# Patient Record
Sex: FEMALE | Race: WHITE | NOT HISPANIC OR LATINO | Employment: FULL TIME | URBAN - METROPOLITAN AREA
[De-identification: names, ages, dates, MRNs, and addresses within clinical notes are randomized per-mention and may not be internally consistent; named-entity substitution may affect disease eponyms.]

---

## 2023-08-16 PROBLEM — C50.911 BREAST CANCER, RIGHT (MULTI): Status: ACTIVE | Noted: 2023-08-16

## 2023-08-16 PROBLEM — C50.911 BREAST CANCER METASTASIZED TO AXILLARY LYMPH NODE, RIGHT (MULTI): Status: ACTIVE | Noted: 2023-08-16

## 2023-08-16 PROBLEM — R07.9 CHEST PAIN ON EXERTION: Status: ACTIVE | Noted: 2023-08-16

## 2023-08-16 PROBLEM — N95.1 HOT FLASHES DUE TO MENOPAUSE: Status: ACTIVE | Noted: 2023-08-16

## 2023-08-16 PROBLEM — C77.3 BREAST CANCER METASTASIZED TO AXILLARY LYMPH NODE, RIGHT (MULTI): Status: ACTIVE | Noted: 2023-08-16

## 2023-08-16 PROBLEM — R00.2 PALPITATIONS: Status: ACTIVE | Noted: 2023-08-16

## 2023-08-16 PROBLEM — L23.7 CONTACT DERMATITIS DUE TO POISON IVY: Status: ACTIVE | Noted: 2023-08-16

## 2023-08-16 PROBLEM — D64.9 ANEMIA: Status: ACTIVE | Noted: 2023-08-16

## 2023-08-16 PROBLEM — E07.89 THYROID FULLNESS: Status: ACTIVE | Noted: 2023-08-16

## 2023-08-16 PROBLEM — Z79.810 USE OF TAMOXIFEN (NOLVADEX): Status: ACTIVE | Noted: 2023-08-16

## 2023-08-16 RX ORDER — OXYCODONE AND ACETAMINOPHEN 5; 325 MG/1; MG/1
1 TABLET ORAL AS NEEDED
COMMUNITY
Start: 2022-11-30 | End: 2023-11-16 | Stop reason: ENTERED-IN-ERROR

## 2023-08-16 RX ORDER — DIPHENOXYLATE HYDROCHLORIDE AND ATROPINE SULFATE 2.5; .025 MG/1; MG/1
2 TABLET ORAL EVERY 6 HOURS PRN
COMMUNITY
Start: 2022-07-05 | End: 2023-11-16 | Stop reason: ENTERED-IN-ERROR

## 2023-08-16 RX ORDER — ONDANSETRON 4 MG/1
1-2 TABLET, FILM COATED ORAL EVERY 8 HOURS PRN
COMMUNITY
Start: 2022-07-01 | End: 2023-11-16 | Stop reason: ENTERED-IN-ERROR

## 2023-08-16 RX ORDER — OMEPRAZOLE 20 MG/1
20 CAPSULE, DELAYED RELEASE ORAL DAILY
COMMUNITY
End: 2023-11-16 | Stop reason: ENTERED-IN-ERROR

## 2023-08-16 RX ORDER — TAMOXIFEN CITRATE 20 MG/1
TABLET ORAL
COMMUNITY
End: 2023-10-28 | Stop reason: ALTCHOICE

## 2023-09-13 ENCOUNTER — HOSPITAL ENCOUNTER (OUTPATIENT)
Dept: DATA CONVERSION | Facility: HOSPITAL | Age: 58
Discharge: HOME | End: 2023-09-13
Payer: COMMERCIAL

## 2023-09-13 DIAGNOSIS — R50.9 FEVER, UNSPECIFIED: ICD-10-CM

## 2023-09-21 VITALS — HEIGHT: 64 IN | BODY MASS INDEX: 33.12 KG/M2 | WEIGHT: 194 LBS

## 2023-09-21 DIAGNOSIS — C50.911 BREAST CANCER METASTASIZED TO AXILLARY LYMPH NODE, RIGHT (MULTI): Primary | ICD-10-CM

## 2023-09-21 DIAGNOSIS — C77.3 BREAST CANCER METASTASIZED TO AXILLARY LYMPH NODE, RIGHT (MULTI): Primary | ICD-10-CM

## 2023-09-21 RX ORDER — HEPARIN SODIUM,PORCINE/PF 10 UNIT/ML
50 SYRINGE (ML) INTRAVENOUS AS NEEDED
Status: CANCELLED | OUTPATIENT
Start: 2023-09-30

## 2023-09-21 RX ORDER — HEPARIN 100 UNIT/ML
500 SYRINGE INTRAVENOUS AS NEEDED
Status: CANCELLED | OUTPATIENT
Start: 2023-09-30

## 2023-09-26 RX ORDER — FAMOTIDINE 10 MG/ML
20 INJECTION INTRAVENOUS ONCE AS NEEDED
Status: CANCELLED | OUTPATIENT
Start: 2023-10-03

## 2023-09-26 RX ORDER — PROCHLORPERAZINE EDISYLATE 5 MG/ML
10 INJECTION INTRAMUSCULAR; INTRAVENOUS EVERY 6 HOURS PRN
Status: CANCELLED | OUTPATIENT
Start: 2023-10-03

## 2023-09-26 RX ORDER — DIPHENHYDRAMINE HYDROCHLORIDE 50 MG/ML
50 INJECTION INTRAMUSCULAR; INTRAVENOUS AS NEEDED
Status: CANCELLED | OUTPATIENT
Start: 2023-10-03

## 2023-09-26 RX ORDER — PROCHLORPERAZINE MALEATE 10 MG
10 TABLET ORAL EVERY 6 HOURS PRN
Status: CANCELLED | OUTPATIENT
Start: 2023-10-03

## 2023-09-26 RX ORDER — ALBUTEROL SULFATE 0.83 MG/ML
3 SOLUTION RESPIRATORY (INHALATION) AS NEEDED
Status: CANCELLED | OUTPATIENT
Start: 2023-10-03

## 2023-09-26 RX ORDER — EPINEPHRINE 0.3 MG/.3ML
0.3 INJECTION SUBCUTANEOUS EVERY 5 MIN PRN
Status: CANCELLED | OUTPATIENT
Start: 2023-10-03

## 2023-09-26 RX ORDER — ONDANSETRON HYDROCHLORIDE 2 MG/ML
8 INJECTION, SOLUTION INTRAVENOUS ONCE
Status: CANCELLED | OUTPATIENT
Start: 2023-10-03

## 2023-10-03 ENCOUNTER — INFUSION (OUTPATIENT)
Dept: HEMATOLOGY/ONCOLOGY | Facility: CLINIC | Age: 58
End: 2023-10-03
Payer: COMMERCIAL

## 2023-10-03 VITALS
HEART RATE: 85 BPM | DIASTOLIC BLOOD PRESSURE: 81 MMHG | RESPIRATION RATE: 17 BRPM | BODY MASS INDEX: 34.53 KG/M2 | HEIGHT: 63 IN | SYSTOLIC BLOOD PRESSURE: 134 MMHG | WEIGHT: 194.89 LBS | OXYGEN SATURATION: 96 % | TEMPERATURE: 97.9 F

## 2023-10-03 DIAGNOSIS — C77.3 BREAST CANCER METASTASIZED TO AXILLARY LYMPH NODE, RIGHT (MULTI): ICD-10-CM

## 2023-10-03 DIAGNOSIS — C50.911 BREAST CANCER METASTASIZED TO AXILLARY LYMPH NODE, RIGHT (MULTI): ICD-10-CM

## 2023-10-03 LAB
ALBUMIN SERPL BCP-MCNC: 4.2 G/DL (ref 3.4–5)
ALP SERPL-CCNC: 77 U/L (ref 33–110)
ALT SERPL W P-5'-P-CCNC: 22 U/L (ref 7–45)
ANION GAP SERPL CALC-SCNC: 15 MMOL/L (ref 10–20)
AST SERPL W P-5'-P-CCNC: 40 U/L (ref 9–39)
B-HCG SERPL-ACNC: 2 MIU/ML
BASOPHILS # BLD AUTO: 0.04 X10*3/UL (ref 0–0.1)
BASOPHILS NFR BLD AUTO: 0.6 %
BILIRUB SERPL-MCNC: 0.9 MG/DL (ref 0–1.2)
BUN SERPL-MCNC: 10 MG/DL (ref 6–23)
CALCIUM SERPL-MCNC: 9.5 MG/DL (ref 8.6–10.3)
CHLORIDE SERPL-SCNC: 106 MMOL/L (ref 98–107)
CO2 SERPL-SCNC: 23 MMOL/L (ref 21–32)
CREAT SERPL-MCNC: 0.6 MG/DL (ref 0.5–1.05)
EOSINOPHIL # BLD AUTO: 0.22 X10*3/UL (ref 0–0.7)
EOSINOPHIL NFR BLD AUTO: 3.4 %
ERYTHROCYTE [DISTWIDTH] IN BLOOD BY AUTOMATED COUNT: 13.8 % (ref 11.5–14.5)
GFR SERPL CREATININE-BSD FRML MDRD: >90 ML/MIN/1.73M*2
GLUCOSE SERPL-MCNC: 84 MG/DL (ref 74–99)
HCT VFR BLD AUTO: 38.7 % (ref 36–46)
HGB BLD-MCNC: 12.9 G/DL (ref 12–16)
IMM GRANULOCYTES # BLD AUTO: 0.01 X10*3/UL (ref 0–0.7)
IMM GRANULOCYTES NFR BLD AUTO: 0.2 % (ref 0–0.9)
LYMPHOCYTES # BLD AUTO: 1.15 X10*3/UL (ref 1.2–4.8)
LYMPHOCYTES NFR BLD AUTO: 17.9 %
MCH RBC QN AUTO: 31.9 PG (ref 26–34)
MCHC RBC AUTO-ENTMCNC: 33.3 G/DL (ref 32–36)
MCV RBC AUTO: 96 FL (ref 80–100)
MONOCYTES # BLD AUTO: 0.57 X10*3/UL (ref 0.1–1)
MONOCYTES NFR BLD AUTO: 8.9 %
NEUTROPHILS # BLD AUTO: 4.43 X10*3/UL (ref 1.2–7.7)
NEUTROPHILS NFR BLD AUTO: 69 %
NRBC BLD-RTO: ABNORMAL /100{WBCS}
PLATELET # BLD AUTO: 172 X10*3/UL (ref 150–450)
PMV BLD AUTO: 9.6 FL (ref 7.5–11.5)
POTASSIUM SERPL-SCNC: 3.1 MMOL/L (ref 3.5–5.3)
PROT SERPL-MCNC: 6.8 G/DL (ref 6.4–8.2)
RBC # BLD AUTO: 4.05 X10*6/UL (ref 4–5.2)
SODIUM SERPL-SCNC: 141 MMOL/L (ref 136–145)
WBC # BLD AUTO: 6.4 X10*3/UL (ref 4.4–11.3)

## 2023-10-03 PROCEDURE — 84702 CHORIONIC GONADOTROPIN TEST: CPT

## 2023-10-03 PROCEDURE — 36591 DRAW BLOOD OFF VENOUS DEVICE: CPT

## 2023-10-03 PROCEDURE — 80053 COMPREHEN METABOLIC PANEL: CPT

## 2023-10-03 PROCEDURE — 85025 COMPLETE CBC W/AUTO DIFF WBC: CPT

## 2023-10-03 PROCEDURE — 36415 COLL VENOUS BLD VENIPUNCTURE: CPT

## 2023-10-03 RX ORDER — DEXAMETHASONE 4 MG/1
4 TABLET ORAL 2 TIMES DAILY
COMMUNITY
Start: 2023-06-21 | End: 2023-10-28 | Stop reason: ALTCHOICE

## 2023-10-03 RX ORDER — HEPARIN SODIUM,PORCINE/PF 10 UNIT/ML
50 SYRINGE (ML) INTRAVENOUS AS NEEDED
Status: CANCELLED | OUTPATIENT
Start: 2023-10-03

## 2023-10-03 RX ORDER — HEPARIN 100 UNIT/ML
500 SYRINGE INTRAVENOUS AS NEEDED
Status: CANCELLED | OUTPATIENT
Start: 2023-10-03

## 2023-10-03 RX ORDER — POTASSIUM CHLORIDE 1500 MG/1
40 TABLET, EXTENDED RELEASE ORAL 2 TIMES DAILY
COMMUNITY
Start: 2023-09-23 | End: 2023-10-26 | Stop reason: SDUPTHER

## 2023-10-03 RX ORDER — HEPARIN 100 UNIT/ML
SYRINGE INTRAVENOUS
Status: DISCONTINUED
Start: 2023-10-03 | End: 2023-10-03 | Stop reason: HOSPADM

## 2023-10-03 RX ORDER — HYDROCORTISONE 25 MG/G
CREAM TOPICAL 2 TIMES DAILY
COMMUNITY
Start: 2023-02-10 | End: 2023-11-16 | Stop reason: ENTERED-IN-ERROR

## 2023-10-03 RX ORDER — ANASTROZOLE 1 MG/1
1 TABLET ORAL DAILY
COMMUNITY
Start: 2023-09-24 | End: 2024-01-31 | Stop reason: SDUPTHER

## 2023-10-03 RX ORDER — PROCHLORPERAZINE MALEATE 10 MG
TABLET ORAL
COMMUNITY
Start: 2023-04-04 | End: 2023-11-16 | Stop reason: ENTERED-IN-ERROR

## 2023-10-03 ASSESSMENT — COLUMBIA-SUICIDE SEVERITY RATING SCALE - C-SSRS
6. HAVE YOU EVER DONE ANYTHING, STARTED TO DO ANYTHING, OR PREPARED TO DO ANYTHING TO END YOUR LIFE?: NO
1. IN THE PAST MONTH, HAVE YOU WISHED YOU WERE DEAD OR WISHED YOU COULD GO TO SLEEP AND NOT WAKE UP?: NO
2. HAVE YOU ACTUALLY HAD ANY THOUGHTS OF KILLING YOURSELF?: NO

## 2023-10-03 ASSESSMENT — PATIENT HEALTH QUESTIONNAIRE - PHQ9
1. LITTLE INTEREST OR PLEASURE IN DOING THINGS: NOT AT ALL
SUM OF ALL RESPONSES TO PHQ9 QUESTIONS 1 AND 2: 0
2. FEELING DOWN, DEPRESSED OR HOPELESS: NOT AT ALL

## 2023-10-03 ASSESSMENT — ENCOUNTER SYMPTOMS
LOSS OF SENSATION IN FEET: 0
DEPRESSION: 0
OCCASIONAL FEELINGS OF UNSTEADINESS: 0

## 2023-10-03 ASSESSMENT — PAIN SCALES - GENERAL: PAINLEVEL: 2

## 2023-10-04 ENCOUNTER — INFUSION (OUTPATIENT)
Dept: HEMATOLOGY/ONCOLOGY | Facility: CLINIC | Age: 58
End: 2023-10-04
Payer: COMMERCIAL

## 2023-10-04 ENCOUNTER — HOSPITAL ENCOUNTER (OUTPATIENT)
Dept: CARDIOLOGY | Facility: HOSPITAL | Age: 58
Discharge: HOME | End: 2023-10-04
Payer: COMMERCIAL

## 2023-10-04 VITALS
WEIGHT: 195.77 LBS | HEART RATE: 84 BPM | SYSTOLIC BLOOD PRESSURE: 138 MMHG | DIASTOLIC BLOOD PRESSURE: 82 MMHG | RESPIRATION RATE: 16 BRPM | HEIGHT: 63 IN | TEMPERATURE: 97.9 F | BODY MASS INDEX: 34.69 KG/M2 | OXYGEN SATURATION: 94 %

## 2023-10-04 DIAGNOSIS — C50.911 BREAST CANCER METASTASIZED TO AXILLARY LYMPH NODE, RIGHT (MULTI): ICD-10-CM

## 2023-10-04 DIAGNOSIS — Z51.81 ENCOUNTER FOR MONITORING CARDIOTOXIC DRUG THERAPY: ICD-10-CM

## 2023-10-04 DIAGNOSIS — Z79.899 ENCOUNTER FOR MONITORING CARDIOTOXIC DRUG THERAPY: ICD-10-CM

## 2023-10-04 DIAGNOSIS — C77.3 BREAST CANCER METASTASIZED TO AXILLARY LYMPH NODE, RIGHT (MULTI): ICD-10-CM

## 2023-10-04 LAB
EJECTION FRACTION APICAL 4 CHAMBER: 67.4
GLOBAL LONGITUDINAL STRAIN: 21

## 2023-10-04 PROCEDURE — 96375 TX/PRO/DX INJ NEW DRUG ADDON: CPT

## 2023-10-04 PROCEDURE — 76376 3D RENDER W/INTRP POSTPROCES: CPT

## 2023-10-04 PROCEDURE — 93356 MYOCRD STRAIN IMG SPCKL TRCK: CPT

## 2023-10-04 PROCEDURE — 96413 CHEMO IV INFUSION 1 HR: CPT

## 2023-10-04 PROCEDURE — 2500000004 HC RX 250 GENERAL PHARMACY W/ HCPCS (ALT 636 FOR OP/ED): Performed by: PHARMACIST

## 2023-10-04 PROCEDURE — 2500000004 HC RX 250 GENERAL PHARMACY W/ HCPCS (ALT 636 FOR OP/ED): Mod: JG | Performed by: INTERNAL MEDICINE

## 2023-10-04 PROCEDURE — 93356 MYOCRD STRAIN IMG SPCKL TRCK: CPT | Performed by: STUDENT IN AN ORGANIZED HEALTH CARE EDUCATION/TRAINING PROGRAM

## 2023-10-04 PROCEDURE — 76376 3D RENDER W/INTRP POSTPROCES: CPT | Performed by: STUDENT IN AN ORGANIZED HEALTH CARE EDUCATION/TRAINING PROGRAM

## 2023-10-04 PROCEDURE — 93306 TTE W/DOPPLER COMPLETE: CPT | Performed by: STUDENT IN AN ORGANIZED HEALTH CARE EDUCATION/TRAINING PROGRAM

## 2023-10-04 RX ORDER — HEPARIN SODIUM,PORCINE/PF 10 UNIT/ML
50 SYRINGE (ML) INTRAVENOUS AS NEEDED
Status: DISCONTINUED | OUTPATIENT
Start: 2023-10-04 | End: 2023-10-04 | Stop reason: HOSPADM

## 2023-10-04 RX ORDER — FAMOTIDINE 10 MG/ML
20 INJECTION INTRAVENOUS ONCE AS NEEDED
Status: DISCONTINUED | OUTPATIENT
Start: 2023-10-04 | End: 2023-10-04 | Stop reason: HOSPADM

## 2023-10-04 RX ORDER — HEPARIN 100 UNIT/ML
500 SYRINGE INTRAVENOUS AS NEEDED
Status: DISCONTINUED | OUTPATIENT
Start: 2023-10-04 | End: 2023-10-04 | Stop reason: HOSPADM

## 2023-10-04 RX ORDER — DIPHENHYDRAMINE HYDROCHLORIDE 50 MG/ML
50 INJECTION INTRAMUSCULAR; INTRAVENOUS AS NEEDED
Status: DISCONTINUED | OUTPATIENT
Start: 2023-10-04 | End: 2023-10-04 | Stop reason: HOSPADM

## 2023-10-04 RX ORDER — ALBUTEROL SULFATE 0.83 MG/ML
3 SOLUTION RESPIRATORY (INHALATION) AS NEEDED
Status: DISCONTINUED | OUTPATIENT
Start: 2023-10-04 | End: 2023-10-04 | Stop reason: HOSPADM

## 2023-10-04 RX ORDER — ONDANSETRON HYDROCHLORIDE 2 MG/ML
8 INJECTION, SOLUTION INTRAVENOUS ONCE
Status: COMPLETED | OUTPATIENT
Start: 2023-10-04 | End: 2023-10-04

## 2023-10-04 RX ORDER — PROCHLORPERAZINE EDISYLATE 5 MG/ML
10 INJECTION INTRAMUSCULAR; INTRAVENOUS EVERY 6 HOURS PRN
Status: DISCONTINUED | OUTPATIENT
Start: 2023-10-04 | End: 2023-10-04 | Stop reason: HOSPADM

## 2023-10-04 RX ORDER — PROCHLORPERAZINE MALEATE 10 MG
10 TABLET ORAL EVERY 6 HOURS PRN
Status: DISCONTINUED | OUTPATIENT
Start: 2023-10-04 | End: 2023-10-04 | Stop reason: HOSPADM

## 2023-10-04 RX ORDER — EPINEPHRINE 0.3 MG/.3ML
0.3 INJECTION SUBCUTANEOUS EVERY 5 MIN PRN
Status: DISCONTINUED | OUTPATIENT
Start: 2023-10-04 | End: 2023-10-04 | Stop reason: HOSPADM

## 2023-10-04 RX ORDER — HEPARIN 100 UNIT/ML
500 SYRINGE INTRAVENOUS AS NEEDED
Status: CANCELLED | OUTPATIENT
Start: 2023-10-04

## 2023-10-04 RX ORDER — HEPARIN SODIUM,PORCINE/PF 10 UNIT/ML
50 SYRINGE (ML) INTRAVENOUS AS NEEDED
Status: CANCELLED | OUTPATIENT
Start: 2023-10-04

## 2023-10-04 RX ADMIN — HEPARIN 500 UNITS: 100 SYRINGE at 16:52

## 2023-10-04 RX ADMIN — ADO-TRASTUZUMAB EMTANSINE 320 MG: 20 INJECTION, POWDER, LYOPHILIZED, FOR SOLUTION INTRAVENOUS at 16:11

## 2023-10-04 RX ADMIN — ONDANSETRON 8 MG: 2 INJECTION, SOLUTION INTRAMUSCULAR; INTRAVENOUS at 16:02

## 2023-10-04 ASSESSMENT — PAIN SCALES - GENERAL: PAINLEVEL: 0-NO PAIN

## 2023-10-15 DIAGNOSIS — I89.0 LYMPHEDEMA: Primary | ICD-10-CM

## 2023-10-16 ENCOUNTER — TREATMENT (OUTPATIENT)
Dept: OCCUPATIONAL THERAPY | Facility: CLINIC | Age: 58
End: 2023-10-16
Payer: COMMERCIAL

## 2023-10-16 DIAGNOSIS — I89.0 LYMPHEDEMA: ICD-10-CM

## 2023-10-16 PROCEDURE — 97530 THERAPEUTIC ACTIVITIES: CPT | Mod: GO,59

## 2023-10-16 PROCEDURE — 97140 MANUAL THERAPY 1/> REGIONS: CPT | Mod: GO

## 2023-10-16 ASSESSMENT — PAIN - FUNCTIONAL ASSESSMENT: PAIN_FUNCTIONAL_ASSESSMENT: 0-10

## 2023-10-16 ASSESSMENT — ENCOUNTER SYMPTOMS
LOSS OF SENSATION IN FEET: 0
DEPRESSION: 1
OCCASIONAL FEELINGS OF UNSTEADINESS: 0

## 2023-10-16 ASSESSMENT — PAIN SCALES - GENERAL: PAINLEVEL_OUTOF10: 0 - NO PAIN

## 2023-10-16 NOTE — PROGRESS NOTES
"Occupational Therapy    Occupational Therapy Treatment    Name: Yvrose John  MRN: [id number]  : 1965  Date: 10/17/23  Time Calculation  Start Time: 801  Stop Time: 855  Time Calculation (min): 54 min    Assessment: increased tissue pliability,  MLD      Plan: pt will notify if she is returning        Subjective   \" jose not sure if I need to come back.  I dont have the pain like I used to\"    General:  OT Last Visit  OT Received On: 23     Pain Assessment:  Pain Assessment  Pain Assessment: 0-10  Pain Score: 0 - No pain (pt reporting no pain to breast)    Objective       Therapy/Activity:      Therapeutic Activity  Therapeutic Activity Performed: Yes  Therapeutic Activity 1: Pt reporting not sure if she needs to return for therapy.  assessed R breast with severe fibrotic edema still present with lymphedema noted.  discussed role and purpose of treatment including pump, garments and TE.  pt will notify if she will plan to return    Manual Therapy  Manual Therapy Performed: Yes  Manual Therapy Activity 1: completed lymphatouch on R breast in order to promote lymph flow and increase lymphatic pathways  Manual Therapy Activity 2: hivamat on R breast as lymphatouch becoming painful with increased tissue pliability noted  Manual Therapy Activity 3: edu pt on MLD modified wiht truncal and breast clearning  Lymphedema Assessment    Lymphedema Assessments:  Lymphedema Assessments: Upper extremities  Right Upper Extremity:     Left Upper Extremity:     UE Skin Appearance/Condition and Girth:  Dryness: y  Edema - Fibrotic: severe fibrotic edema in R breast  Edema - Pitting: y  Other (comment): axillary   110 cm , nipple 122cm, abdominal 106.5 cm     Prior Function:     Pain Assessment:  Pain Assessment: 0-10  Pain Score: 0 - No pain (pt reporting no pain to breast)  Therapy Precautions:  STEADI Fall Risk Score (The score of 4 or more indicates an increased risk of falling): 1    OP " EDUCATION:  Education  Individual(s) Educated: Patient  Education Provided: Lymphedema, Other  Home Program: Other, Handout issued    Goals:  Active       Lymphedema       Pt will be I with stating and demonstrating home exercise program in order to improve patients ability to perform self-care, household, work and/or leisure tasks.        Start:  10/17/23    Expected End:  12/05/23            Pt will be able to perform self care, household, work and or leisure tasks with   0-2 /10 pain rating, 100 % of the time        Start:  10/17/23    Expected End:  12/05/23            Pt will demo 5% cm  of volume reduction with RUE in order for proper fitting of medical compression garments        Start:  10/17/23    Expected End:  12/05/23            Pt will improve active ROM in order to perform self-care, household, work and/or leisure tasks.  R shoulder flex 155, ext 45, abd 165, IR T9,        Start:  10/17/23    Expected End:  12/05/23            Pt will improve strengthening in order to perform self-care, household, work and/or leisure tasks.  R shoulder 5/5 mmt in all planes of movement        Start:  10/17/23    Expected End:  12/05/23            Pt will I perform skin care for infection prevention and integrity of skin        Start:  10/17/23    Expected End:  12/05/23            I with donning/doffing medical compression garments        Start:  10/17/23    Expected End:  12/05/23

## 2023-10-17 RX ORDER — PROCHLORPERAZINE EDISYLATE 5 MG/ML
10 INJECTION INTRAMUSCULAR; INTRAVENOUS EVERY 6 HOURS PRN
Status: CANCELLED | OUTPATIENT
Start: 2023-10-25

## 2023-10-17 RX ORDER — EPINEPHRINE 0.3 MG/.3ML
0.3 INJECTION SUBCUTANEOUS EVERY 5 MIN PRN
Status: CANCELLED | OUTPATIENT
Start: 2023-10-25

## 2023-10-17 RX ORDER — FAMOTIDINE 10 MG/ML
20 INJECTION INTRAVENOUS ONCE AS NEEDED
Status: CANCELLED | OUTPATIENT
Start: 2023-10-25

## 2023-10-17 RX ORDER — ONDANSETRON HYDROCHLORIDE 2 MG/ML
8 INJECTION, SOLUTION INTRAVENOUS ONCE
Status: CANCELLED | OUTPATIENT
Start: 2023-10-25

## 2023-10-17 RX ORDER — DIPHENHYDRAMINE HYDROCHLORIDE 50 MG/ML
50 INJECTION INTRAMUSCULAR; INTRAVENOUS AS NEEDED
Status: CANCELLED | OUTPATIENT
Start: 2023-10-25

## 2023-10-17 RX ORDER — ALBUTEROL SULFATE 0.83 MG/ML
3 SOLUTION RESPIRATORY (INHALATION) AS NEEDED
Status: CANCELLED | OUTPATIENT
Start: 2023-10-25

## 2023-10-17 RX ORDER — PROCHLORPERAZINE MALEATE 10 MG
10 TABLET ORAL EVERY 6 HOURS PRN
Status: CANCELLED | OUTPATIENT
Start: 2023-10-25

## 2023-10-18 ENCOUNTER — TREATMENT (OUTPATIENT)
Dept: OCCUPATIONAL THERAPY | Facility: CLINIC | Age: 58
End: 2023-10-18
Payer: COMMERCIAL

## 2023-10-18 DIAGNOSIS — I89.0 LYMPHEDEMA: ICD-10-CM

## 2023-10-18 PROCEDURE — 97140 MANUAL THERAPY 1/> REGIONS: CPT | Mod: GO

## 2023-10-18 ASSESSMENT — PAIN - FUNCTIONAL ASSESSMENT: PAIN_FUNCTIONAL_ASSESSMENT: 0-10

## 2023-10-18 ASSESSMENT — PAIN SCALES - GENERAL: PAINLEVEL_OUTOF10: 4

## 2023-10-18 NOTE — PROGRESS NOTES
Occupational Therapy    Occupational Therapy Treatment    Name: Yvrose John  MRN: [id number]  : 1965  Date: 10/18/23  Time Calculation  Start Time: 0800  Stop Time: 0856  Time Calculation (min): 56 min    Assessment: increased tissue pliability, decreased stiffness, decreased edema volume      Plan:  POC as tolerated        Subjective   General:  OT Last Visit  OT Received On: 10/16/23     Pain Assessment:  Pain Assessment  Pain Assessment: 0-10  Pain Score: 4  Pain Type: Other (Comment)  Pain Location: Breast  Pain Orientation:  (shoulder)  Pain Descriptors: Aching, Jabbing, Burning, Radiating, Tightness  Pain Onset: Other (Comment)  Clinical Progression:  (post radiation)  Response to Interventions: see note    Objective       Therapy/Activity:           Manual Therapy  Manual Therapy Performed: Yes  Manual Therapy Activity 1: hivamat with MLD in order to promote lymph flow and increase tissue pliability  Manual Therapy Activity 2: K taping wiht support under R breast and fanning tech on R  breast for lymph flow  Sensory:        Lymphedema Assessment    Lymphedema Assessments:  Lymphedema Assessments: Upper extremities  Right Upper Extremity:  R Thumb Distal Phalange Girth: 6 cm  R Thumb Proximal Phalange Girth: 7.3 cm  R Metacarpals (cm): 18.6 cm  R Palm (cm): 0 cm  R Wrist (cm): 17.5 cm  R 10 cm Above Wrist (cm): 24.5 cm  R 15 cm Above Wrist (cm): 27 cm  R 20 cm Above Wrist (cm): 27.5 cm  R Elbow (cm): 28 cm  R 5 cm Above Elbow: 33.5 cm  R 10 cm Above Elbow: 35 cm  R 20 cm Above Elbow: 39 cm  R Axilla: 0 cm  R Upper Extremity Total: 263.9  Left Upper Extremity:  L Thumb Distal Phalange Girth: 5.8 cm  L Thumb Proximal Phalange Girth: 7 cm  L Metacarpals (cm): 18.5 cm  L Palm (cm): 0 cm  L Wrist (cm): 16.5 cm  L 10 cm Above Wrist (cm): 23.5 cm  L 15 cm Above Wrist (cm): 26 cm  L 20 cm Above Wrist (cm): 26 cm  L Elbow (cm): 26.5 cm  L 5 cm Above Elbow: 30 cm  L 10cm Above Elbow: 32.5  L 20 cm Above  Elbow: 35  L Axilla: 0 cm  Left upper extremity total: 247.3  UE Skin Appearance/Condition and Girth:  Dryness: y  Edema - Fibrotic: severe fibrotic edema in R breast  Edema - Pitting: y  Hemosiderin Staining: y  Other (comment): axillary   104 cm decreased 6 cm , nipple 120 cm decreased 2 cm , abdominal 107 cm increased .5 cm from last session  Upper Extremity Evaluation:        Prior Function:     Pain Assessment:  Pain Assessment: 0-10  Pain Score: 4  Pain Type: Other (Comment)  Pain Location: Breast  Pain Orientation:  (shoulder)  Pain Descriptors: Aching, Jabbing, Burning, Radiating, Tightness  Pain Onset: Other (Comment)  Clinical Progression:  (post radiation)  Response to Interventions: see note  Therapy Precautions:  Precautions Comment: NA  OP EDUCATION:  Education  Education Provided: Lymphedema  Home Program: Other    Goals:  Active       Lymphedema       Pt will be I with stating and demonstrating home exercise program in order to improve patients ability to perform self-care, household, work and/or leisure tasks.        Start:  10/17/23    Expected End:  12/05/23            Pt will be able to perform self care, household, work and or leisure tasks with   0-2 /10 pain rating, 100 % of the time        Start:  10/17/23    Expected End:  12/05/23            Pt will demo 5% cm  of volume reduction with RUE in order for proper fitting of medical compression garments        Start:  10/17/23    Expected End:  12/05/23            Pt will improve active ROM in order to perform self-care, household, work and/or leisure tasks.  R shoulder flex 155, ext 45, abd 165, IR T9,        Start:  10/17/23    Expected End:  12/05/23            Pt will improve strengthening in order to perform self-care, household, work and/or leisure tasks.  R shoulder 5/5 mmt in all planes of movement        Start:  10/17/23    Expected End:  12/05/23            Pt will I perform skin care for infection prevention and integrity of skin         Start:  10/17/23    Expected End:  12/05/23            I with donning/doffing medical compression garments        Start:  10/17/23    Expected End:  12/05/23

## 2023-10-20 ENCOUNTER — TELEPHONE (OUTPATIENT)
Dept: HEMATOLOGY/ONCOLOGY | Facility: HOSPITAL | Age: 58
End: 2023-10-20
Payer: COMMERCIAL

## 2023-10-20 NOTE — TELEPHONE ENCOUNTER
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

## 2023-10-23 ENCOUNTER — TREATMENT (OUTPATIENT)
Dept: OCCUPATIONAL THERAPY | Facility: CLINIC | Age: 58
End: 2023-10-23
Payer: COMMERCIAL

## 2023-10-23 DIAGNOSIS — I89.0 LYMPHEDEMA: ICD-10-CM

## 2023-10-23 PROCEDURE — 97140 MANUAL THERAPY 1/> REGIONS: CPT | Mod: GO

## 2023-10-23 NOTE — PROGRESS NOTES
Occupational Therapy    Occupational Therapy Treatment    Name: Yvrose John  MRN: [id number]  : 1965  Date: 10/23/23  Time Calculation  Start Time: 0800  Stop Time: 0856  Time Calculation (min): 56 min    Assessment:  increased tissue pliability, decreased pain in RUE,  decreased edema noted      Plan:  continue POC as tolerated, purchase compression bra in next session        Subjective   General:  OT Last Visit  OT Received On: 10/18/23     Pain Assessment:  Pain Assessment  Response to Interventions: Pt reported adverse reaction to taping , no K taping this date    Objective       Therapy/Activity:           Manual Therapy  Manual Therapy Performed: Yes  Manual Therapy Activity 1: hivamat with MLD in order to promote lymph flow and increase tissue pliability  Manual Therapy Activity 2: lympha touch with stationary circles to promote increased lymphatic stimulation     Lymphedema Assessment    Lymphedema Assessments:  Lymphedema Assessments: Upper extremities  Right Upper Extremity:  R Thumb Distal Phalange Girth: 6 cm  R Thumb Proximal Phalange Girth: 6.8 cm  R Metacarpals (cm): 18 cm  R Palm (cm): 0 cm  R Wrist (cm): 16.8 cm  R 10 cm Above Wrist (cm): 24.5 cm  R 15 cm Above Wrist (cm): 27 cm  R 20 cm Above Wrist (cm): 27.4 cm  R Elbow (cm): 27.8 cm  R 5 cm Above Elbow: 32 cm  R 10 cm Above Elbow: 34.5 cm  R 20 cm Above Elbow: 36.5 cm  R Axilla: 0 cm  R Upper Extremity Total: 257.3 decreased 6.6 cm from last session  Left Upper Extremity:  L Thumb Distal Phalange Girth: 6 cm  L Thumb Proximal Phalange Girth: 7 cm  L Metacarpals (cm): 18.7 cm  L Palm (cm): 0 cm  L Wrist (cm): 16 cm  L 10 cm Above Wrist (cm): 23 cm  L 15 cm Above Wrist (cm): 26 cm  L 20 cm Above Wrist (cm): 25.8 cm  L Elbow (cm): 26 cm  L 5 cm Above Elbow: 30 cm  L 10cm Above Elbow: 32  L 20 cm Above Elbow: 34.3  L Axilla: 0 cm  Left upper extremity total: 244.8  decreased 3.5 cm from last session   UE Skin Appearance/Condition and  Girth:  Edema - Fibrotic: decreased in  Edema - Pitting: decreased in R breast noted this date.  Other (comment): axillary  106.5 cm decreased 3.5 cm , nipple 121cm decreased 1 cm  abdominal 106.5 cm no change  OP EDUCATION:  Education  Education Provided: Lymphedema    Goals:  Active       Lymphedema       Pt will be I with stating and demonstrating home exercise program in order to improve patients ability to perform self-care, household, work and/or leisure tasks.        Start:  10/17/23    Expected End:  12/05/23            Pt will be able to perform self care, household, work and or leisure tasks with   0-2 /10 pain rating, 100 % of the time        Start:  10/17/23    Expected End:  12/05/23            Pt will demo 5% cm  of volume reduction with RUE in order for proper fitting of medical compression garments        Start:  10/17/23    Expected End:  12/05/23            Pt will improve active ROM in order to perform self-care, household, work and/or leisure tasks.  R shoulder flex 155, ext 45, abd 165, IR T9,        Start:  10/17/23    Expected End:  12/05/23            Pt will improve strengthening in order to perform self-care, household, work and/or leisure tasks.  R shoulder 5/5 mmt in all planes of movement        Start:  10/17/23    Expected End:  12/05/23            Pt will I perform skin care for infection prevention and integrity of skin        Start:  10/17/23    Expected End:  12/05/23            I with donning/doffing medical compression garments        Start:  10/17/23    Expected End:  12/05/23

## 2023-10-24 ENCOUNTER — INFUSION (OUTPATIENT)
Dept: HEMATOLOGY/ONCOLOGY | Facility: CLINIC | Age: 58
End: 2023-10-24
Payer: COMMERCIAL

## 2023-10-24 VITALS
SYSTOLIC BLOOD PRESSURE: 124 MMHG | RESPIRATION RATE: 16 BRPM | DIASTOLIC BLOOD PRESSURE: 85 MMHG | HEIGHT: 63 IN | OXYGEN SATURATION: 94 % | TEMPERATURE: 98.6 F | BODY MASS INDEX: 34.41 KG/M2 | WEIGHT: 194.22 LBS | HEART RATE: 84 BPM

## 2023-10-24 DIAGNOSIS — C50.911 BREAST CANCER METASTASIZED TO AXILLARY LYMPH NODE, RIGHT (MULTI): Primary | ICD-10-CM

## 2023-10-24 DIAGNOSIS — C77.3 BREAST CANCER METASTASIZED TO AXILLARY LYMPH NODE, RIGHT (MULTI): Primary | ICD-10-CM

## 2023-10-24 LAB
ALBUMIN SERPL BCP-MCNC: 4.2 G/DL (ref 3.4–5)
ALP SERPL-CCNC: 92 U/L (ref 33–110)
ALT SERPL W P-5'-P-CCNC: 22 U/L (ref 7–45)
ANION GAP SERPL CALC-SCNC: 11 MMOL/L (ref 10–20)
AST SERPL W P-5'-P-CCNC: 40 U/L (ref 9–39)
B-HCG SERPL-ACNC: 3 MIU/ML
BASOPHILS # BLD AUTO: 0.04 X10*3/UL (ref 0–0.1)
BASOPHILS NFR BLD AUTO: 0.5 %
BILIRUB SERPL-MCNC: 0.8 MG/DL (ref 0–1.2)
BUN SERPL-MCNC: 9 MG/DL (ref 6–23)
CALCIUM SERPL-MCNC: 9.4 MG/DL (ref 8.6–10.3)
CHLORIDE SERPL-SCNC: 105 MMOL/L (ref 98–107)
CO2 SERPL-SCNC: 26 MMOL/L (ref 21–32)
CREAT SERPL-MCNC: 0.59 MG/DL (ref 0.5–1.05)
EOSINOPHIL # BLD AUTO: 0.17 X10*3/UL (ref 0–0.7)
EOSINOPHIL NFR BLD AUTO: 2.1 %
ERYTHROCYTE [DISTWIDTH] IN BLOOD BY AUTOMATED COUNT: 13.8 % (ref 11.5–14.5)
GFR SERPL CREATININE-BSD FRML MDRD: >90 ML/MIN/1.73M*2
GLUCOSE SERPL-MCNC: 71 MG/DL (ref 74–99)
HCT VFR BLD AUTO: 39.4 % (ref 36–46)
HGB BLD-MCNC: 13.3 G/DL (ref 12–16)
IMM GRANULOCYTES # BLD AUTO: 0.01 X10*3/UL (ref 0–0.7)
IMM GRANULOCYTES NFR BLD AUTO: 0.1 % (ref 0–0.9)
LYMPHOCYTES # BLD AUTO: 1.27 X10*3/UL (ref 1.2–4.8)
LYMPHOCYTES NFR BLD AUTO: 15.9 %
MCH RBC QN AUTO: 32 PG (ref 26–34)
MCHC RBC AUTO-ENTMCNC: 33.8 G/DL (ref 32–36)
MCV RBC AUTO: 95 FL (ref 80–100)
MONOCYTES # BLD AUTO: 0.83 X10*3/UL (ref 0.1–1)
MONOCYTES NFR BLD AUTO: 10.4 %
NEUTROPHILS # BLD AUTO: 5.67 X10*3/UL (ref 1.2–7.7)
NEUTROPHILS NFR BLD AUTO: 71 %
PLATELET # BLD AUTO: 174 X10*3/UL (ref 150–450)
PMV BLD AUTO: 9.4 FL (ref 7.5–11.5)
POTASSIUM SERPL-SCNC: 3.1 MMOL/L (ref 3.5–5.3)
PROT SERPL-MCNC: 6.7 G/DL (ref 6.4–8.2)
RBC # BLD AUTO: 4.15 X10*6/UL (ref 4–5.2)
SODIUM SERPL-SCNC: 139 MMOL/L (ref 136–145)
WBC # BLD AUTO: 8 X10*3/UL (ref 4.4–11.3)

## 2023-10-24 PROCEDURE — 85025 COMPLETE CBC W/AUTO DIFF WBC: CPT

## 2023-10-24 PROCEDURE — 80053 COMPREHEN METABOLIC PANEL: CPT

## 2023-10-24 PROCEDURE — 84702 CHORIONIC GONADOTROPIN TEST: CPT

## 2023-10-24 PROCEDURE — 2500000004 HC RX 250 GENERAL PHARMACY W/ HCPCS (ALT 636 FOR OP/ED)

## 2023-10-24 PROCEDURE — 36591 DRAW BLOOD OFF VENOUS DEVICE: CPT

## 2023-10-24 RX ORDER — HEPARIN 100 UNIT/ML
500 SYRINGE INTRAVENOUS AS NEEDED
Status: DISCONTINUED | OUTPATIENT
Start: 2023-10-24 | End: 2023-10-24 | Stop reason: HOSPADM

## 2023-10-24 RX ORDER — HEPARIN 100 UNIT/ML
SYRINGE INTRAVENOUS
Status: COMPLETED
Start: 2023-10-24 | End: 2023-10-24

## 2023-10-24 RX ORDER — HEPARIN SODIUM,PORCINE/PF 10 UNIT/ML
50 SYRINGE (ML) INTRAVENOUS AS NEEDED
Status: CANCELLED | OUTPATIENT
Start: 2023-10-24

## 2023-10-24 RX ORDER — HEPARIN 100 UNIT/ML
500 SYRINGE INTRAVENOUS AS NEEDED
Status: CANCELLED | OUTPATIENT
Start: 2023-10-24

## 2023-10-24 RX ADMIN — HEPARIN 500 UNITS: 100 SYRINGE at 13:32

## 2023-10-24 ASSESSMENT — PAIN SCALES - GENERAL: PAINLEVEL: 0-NO PAIN

## 2023-10-25 ENCOUNTER — INFUSION (OUTPATIENT)
Dept: HEMATOLOGY/ONCOLOGY | Facility: CLINIC | Age: 58
End: 2023-10-25
Payer: COMMERCIAL

## 2023-10-25 ENCOUNTER — OFFICE VISIT (OUTPATIENT)
Dept: HEMATOLOGY/ONCOLOGY | Facility: CLINIC | Age: 58
End: 2023-10-25
Payer: COMMERCIAL

## 2023-10-25 ENCOUNTER — DOCUMENTATION (OUTPATIENT)
Dept: HEMATOLOGY/ONCOLOGY | Facility: HOSPITAL | Age: 58
End: 2023-10-25

## 2023-10-25 VITALS
OXYGEN SATURATION: 93 % | HEART RATE: 76 BPM | SYSTOLIC BLOOD PRESSURE: 134 MMHG | DIASTOLIC BLOOD PRESSURE: 86 MMHG | WEIGHT: 195.11 LBS | BODY MASS INDEX: 35.01 KG/M2 | RESPIRATION RATE: 18 BRPM | TEMPERATURE: 96.6 F

## 2023-10-25 DIAGNOSIS — C77.3 BREAST CANCER METASTASIZED TO AXILLARY LYMPH NODE, RIGHT (MULTI): ICD-10-CM

## 2023-10-25 DIAGNOSIS — E87.6 HYPOKALEMIA: Primary | ICD-10-CM

## 2023-10-25 DIAGNOSIS — F33.1 MODERATE EPISODE OF RECURRENT MAJOR DEPRESSIVE DISORDER (MULTI): ICD-10-CM

## 2023-10-25 DIAGNOSIS — C50.911 BREAST CANCER METASTASIZED TO AXILLARY LYMPH NODE, RIGHT (MULTI): ICD-10-CM

## 2023-10-25 DIAGNOSIS — I89.0 LYMPHEDEMA: ICD-10-CM

## 2023-10-25 LAB
APTT PPP: 167 SECONDS (ref 27–38)
INR PPP: 1 (ref 0.9–1.1)
PROTHROMBIN TIME: 11.7 SECONDS (ref 9.8–12.8)

## 2023-10-25 PROCEDURE — 96375 TX/PRO/DX INJ NEW DRUG ADDON: CPT | Mod: INF

## 2023-10-25 PROCEDURE — 1036F TOBACCO NON-USER: CPT | Performed by: INTERNAL MEDICINE

## 2023-10-25 PROCEDURE — 3008F BODY MASS INDEX DOCD: CPT | Performed by: INTERNAL MEDICINE

## 2023-10-25 PROCEDURE — 2500000004 HC RX 250 GENERAL PHARMACY W/ HCPCS (ALT 636 FOR OP/ED): Performed by: INTERNAL MEDICINE

## 2023-10-25 PROCEDURE — 2500000004 HC RX 250 GENERAL PHARMACY W/ HCPCS (ALT 636 FOR OP/ED): Performed by: PHARMACIST

## 2023-10-25 PROCEDURE — 96413 CHEMO IV INFUSION 1 HR: CPT

## 2023-10-25 PROCEDURE — 85730 THROMBOPLASTIN TIME PARTIAL: CPT

## 2023-10-25 PROCEDURE — 85610 PROTHROMBIN TIME: CPT

## 2023-10-25 PROCEDURE — 99215 OFFICE O/P EST HI 40 MIN: CPT | Mod: 25 | Performed by: INTERNAL MEDICINE

## 2023-10-25 PROCEDURE — 99215 OFFICE O/P EST HI 40 MIN: CPT | Performed by: INTERNAL MEDICINE

## 2023-10-25 RX ORDER — HEPARIN 100 UNIT/ML
500 SYRINGE INTRAVENOUS AS NEEDED
Status: DISCONTINUED | OUTPATIENT
Start: 2023-10-25 | End: 2023-10-25 | Stop reason: HOSPADM

## 2023-10-25 RX ORDER — DIPHENHYDRAMINE HYDROCHLORIDE 50 MG/ML
50 INJECTION INTRAMUSCULAR; INTRAVENOUS AS NEEDED
Status: DISCONTINUED | OUTPATIENT
Start: 2023-10-25 | End: 2023-10-25 | Stop reason: HOSPADM

## 2023-10-25 RX ORDER — HEPARIN SODIUM,PORCINE/PF 10 UNIT/ML
50 SYRINGE (ML) INTRAVENOUS AS NEEDED
OUTPATIENT
Start: 2023-10-25

## 2023-10-25 RX ORDER — PROCHLORPERAZINE MALEATE 10 MG
10 TABLET ORAL EVERY 6 HOURS PRN
Status: DISCONTINUED | OUTPATIENT
Start: 2023-10-25 | End: 2023-10-25 | Stop reason: HOSPADM

## 2023-10-25 RX ORDER — HEPARIN 100 UNIT/ML
500 SYRINGE INTRAVENOUS AS NEEDED
OUTPATIENT
Start: 2023-10-25

## 2023-10-25 RX ORDER — ONDANSETRON HYDROCHLORIDE 2 MG/ML
8 INJECTION, SOLUTION INTRAVENOUS ONCE
Status: COMPLETED | OUTPATIENT
Start: 2023-10-25 | End: 2023-10-25

## 2023-10-25 RX ORDER — EPINEPHRINE 0.3 MG/.3ML
0.3 INJECTION SUBCUTANEOUS EVERY 5 MIN PRN
Status: DISCONTINUED | OUTPATIENT
Start: 2023-10-25 | End: 2023-10-25 | Stop reason: HOSPADM

## 2023-10-25 RX ORDER — FAMOTIDINE 10 MG/ML
20 INJECTION INTRAVENOUS ONCE AS NEEDED
Status: DISCONTINUED | OUTPATIENT
Start: 2023-10-25 | End: 2023-10-25 | Stop reason: HOSPADM

## 2023-10-25 RX ORDER — HEPARIN SODIUM,PORCINE/PF 10 UNIT/ML
50 SYRINGE (ML) INTRAVENOUS AS NEEDED
Status: DISCONTINUED | OUTPATIENT
Start: 2023-10-25 | End: 2023-10-25 | Stop reason: HOSPADM

## 2023-10-25 RX ORDER — ALBUTEROL SULFATE 0.83 MG/ML
3 SOLUTION RESPIRATORY (INHALATION) AS NEEDED
Status: DISCONTINUED | OUTPATIENT
Start: 2023-10-25 | End: 2023-10-25 | Stop reason: HOSPADM

## 2023-10-25 RX ORDER — PROCHLORPERAZINE EDISYLATE 5 MG/ML
10 INJECTION INTRAMUSCULAR; INTRAVENOUS EVERY 6 HOURS PRN
Status: DISCONTINUED | OUTPATIENT
Start: 2023-10-25 | End: 2023-10-25 | Stop reason: HOSPADM

## 2023-10-25 RX ADMIN — ONDANSETRON 8 MG: 2 INJECTION INTRAMUSCULAR; INTRAVENOUS at 14:01

## 2023-10-25 RX ADMIN — HEPARIN 500 UNITS: 100 SYRINGE at 14:52

## 2023-10-25 RX ADMIN — ADO-TRASTUZUMAB EMTANSINE 320 MG: 20 INJECTION, POWDER, LYOPHILIZED, FOR SOLUTION INTRAVENOUS at 14:15

## 2023-10-25 ASSESSMENT — ENCOUNTER SYMPTOMS
LOSS OF SENSATION IN FEET: 0
OCCASIONAL FEELINGS OF UNSTEADINESS: 0
DEPRESSION: 0

## 2023-10-25 ASSESSMENT — PAIN SCALES - GENERAL: PAINLEVEL: 0-NO PAIN

## 2023-10-25 NOTE — SIGNIFICANT EVENT
10/25/23 6157   Prechemo Checklist   Has the patient been in the hospital, ED, or urgent care since last date of service No   Chemo/Immuno Consent Signed Yes   Protocol/Indications Verified Yes   Confirmed to previous date/time of medication Yes   Compared to previous dose Yes   All medications are dated accurately Yes   Pregnancy Test Negative Not applicable   Parameters Met Yes   BSA/Weight-Height Verified Yes   Dose Calculations Verified Yes

## 2023-10-26 ENCOUNTER — TREATMENT (OUTPATIENT)
Dept: OCCUPATIONAL THERAPY | Facility: CLINIC | Age: 58
End: 2023-10-26
Payer: COMMERCIAL

## 2023-10-26 DIAGNOSIS — I89.0 LYMPHEDEMA: ICD-10-CM

## 2023-10-26 PROCEDURE — 97140 MANUAL THERAPY 1/> REGIONS: CPT | Mod: GO

## 2023-10-26 RX ORDER — POTASSIUM CHLORIDE 1500 MG/1
40 TABLET, EXTENDED RELEASE ORAL 2 TIMES DAILY
Qty: 120 TABLET | Refills: 0 | Status: SHIPPED | OUTPATIENT
Start: 2023-10-26 | End: 2023-11-25

## 2023-10-26 NOTE — PROGRESS NOTES
Received a call about critical lab aPTT>167. Normal PT/INR. This is likely a lab error. From EMR meds review, some heparin flushes and alteplase were used today.    Tried to call patient who did not . Patient may warrant a repeat lab draw.    Vinay Moore MD  Hematology/Oncology Fellow  Personal Pager: [phone]  10/25/2023

## 2023-10-26 NOTE — PROGRESS NOTES
"Occupational Therapy    Occupational Therapy Treatment    Name: Yvrose John  MRN: [id number]  : 1965  Date: 10/26/23       Assessment:  increased tissue pliability, increased motion with UE      Plan: continue POC as tolerated        Subjective    \" the foam is leaving a perfect imprint under the breast, but it does not hurt.  I felt flu like behavior starting Monday night and lasted the night and part of the morning\"    General:  OT Last Visit  OT Received On: 10/23/23     Pain Assessment:  no pain reported        Objective       Therapy/Activity:           Manual Therapy  Manual Therapy Performed: Yes  Manual Therapy Activity 1: completed lymphatouch on R breast in order to promote lymph flow and increase lymphatic pathways  Manual Therapy Activity 2: hivamat on R breast in order to promote lymph flow  Manual Therapy Activity 3: girth measurements with good volume reduction noted     Lymphedema Assessment    Lymphedema Assessments:  Lymphedema Assessments: Upper extremities  Right Upper Extremity:  R Thumb Distal Phalange Girth: 5.8 cm  R Thumb Proximal Phalange Girth: 7 cm  R Metacarpals (cm): 18.3 cm  R Palm (cm): 0 cm  R Wrist (cm): 16.5 cm  R 10 cm Above Wrist (cm): 24 cm  R 15 cm Above Wrist (cm): 27 cm  R 20 cm Above Wrist (cm): 27 cm  R Elbow (cm): 27.3 cm  R 5 cm Above Elbow: 32.7 cm  R 10 cm Above Elbow: 33.5 cm  R 20 cm Above Elbow: 37.5 cm  R Axilla: 0 cm  R Upper Extremity Total: 256.6  Left Upper Extremity:     UE Skin Appearance/Condition and Girth:  Other (comment): axillary  104 cm decreased 2.5 cm , nipple 122.5 cm increased 1.5 cm abdominal 106.5 cm no change     Pain Assessment:     Therapy Precautions:     OP EDUCATION:  Education  Individual(s) Educated: Patient  Education Provided: Lymphedema    Goals:  Active       Lymphedema       Pt will be I with stating and demonstrating home exercise program in order to improve patients ability to perform self-care, household, work and/or " leisure tasks.        Start:  10/17/23    Expected End:  12/05/23            Pt will be able to perform self care, household, work and or leisure tasks with   0-2 /10 pain rating, 100 % of the time        Start:  10/17/23    Expected End:  12/05/23            Pt will demo 5% cm  of volume reduction with RUE in order for proper fitting of medical compression garments        Start:  10/17/23    Expected End:  12/05/23            Pt will improve active ROM in order to perform self-care, household, work and/or leisure tasks.  R shoulder flex 155, ext 45, abd 165, IR T9,        Start:  10/17/23    Expected End:  12/05/23            Pt will improve strengthening in order to perform self-care, household, work and/or leisure tasks.  R shoulder 5/5 mmt in all planes of movement        Start:  10/17/23    Expected End:  12/05/23            Pt will I perform skin care for infection prevention and integrity of skin        Start:  10/17/23    Expected End:  12/05/23            I with donning/doffing medical compression garments        Start:  10/17/23    Expected End:  12/05/23

## 2023-10-28 PROBLEM — C50.911 BREAST CANCER, RIGHT (MULTI): Status: RESOLVED | Noted: 2023-08-16 | Resolved: 2023-10-28

## 2023-10-28 PROBLEM — E87.6 HYPOKALEMIA: Status: ACTIVE | Noted: 2023-10-28

## 2023-10-28 PROBLEM — Z79.810 USE OF TAMOXIFEN (NOLVADEX): Status: RESOLVED | Noted: 2023-08-16 | Resolved: 2023-10-28

## 2023-10-29 NOTE — PROGRESS NOTES
DIVISION OF MEDICAL ONCOLOGY    Patient ID: Yvrose John is a 58 y.o. female.  MRN: [id number]  : 1965  The patient presents to clinic today for her history of right breast cancer.     Cancer Staging   Breast cancer metastasized to axillary lymph node, right (CMS/HCC)  Staging form: Breast, AJCC 8th Edition  - Pathologic stage from 2022: No Stage Recommended - Signed by Danny Chowdary MD on 10/28/2023  pT1c  pN2a  cM0  Andrea grade: G2  ER Status: Positive  OK Status: Positive  HER2 Status: Positive    Diagnostic/Therapeutic History:  - Noted pain, rash right breast.  - 2022: Skin biopsy showed eosinophilic spongiotic dermatitis. Mammogram and ultrasound showed a 1.4 cm right breast mass with multiple abnormal right axillary LN's.  - 6/3/22: US-guided biopsy of the right breast mass showed IDC, grade 2-3, ER >95% OK 40%, Her2+ (3+ IHC). An axillary LN was positive for metastatic carcinoma.  - 22: PET/CT did not showed any evidence of distant metastatic disease, but did show hypermetabolic activity in the 2.5 cm right breast mass and ALN's (cT2N1).  - Initiated on TCHP 22. She developed papilledema that was attributed to the carboplatin, so this was removed from cycle 2-6. Completed cycle #6 of THP on 10/17/22.  - HP initiated while awaiting surgery.  - Right lumpectomy/ALND performed on 22. 1.4 cm of residual grade 2 breast tumor noted with 4 LN's positive for carcinoma (2 macrometastases; 2 micrometastases). No GALI noted, but tumor deposits present in perinodal soft tissue. +LVI. Margins negative.  ypT1c ypN2a.  - Initial plan was for immediate reconstruction, but ultimately decided to purse adjuvant RT.  - Switched to adjuvant TDM1 23.  - Adjuvant RT 23-3/16/23.    History of Present Illness (HPI)/Interval History:  Yvrose John presents today for routine FUV and TDM1 (last cycle).  She is accompanied by her  today.  Working with PT for lymphedema  management.  Hot flashes stable. Otherwise tolerating anastrozole well.  Previously-noted cough has improved.  She would like her port removed.  No diarrhea, dyspnea, edema, abdominal pain.    Review of Systems:  14-point ROS otherwise negative, as per HPI/Interval History.    Past Medical History:   Diagnosis Date    Breast cancer metastasized to axillary lymph node, right (CMS/HCC) 2023    Changes in skin texture 2022    Breast skin changes    Personal history of other diseases of the circulatory system 2018    History of rheumatic fever    Personal history of other diseases of the circulatory system 2018    History of mitral valve prolapse    Personal history of other specified conditions 2022    History of lump of right breast     Patient Active Problem List   Diagnosis    Anemia    Breast cancer metastasized to axillary lymph node, right (CMS/HCC)    Chest pain on exertion    Contact dermatitis due to poison ivy    Hot flashes due to menopause    Thyroid fullness    Palpitations    Lymphedema    Hypokalemia        Past Surgical History:   Procedure Laterality Date    DILATION AND CURETTAGE OF UTERUS  2013    Dilation And Curettage    HYSTEROSCOPY  2013    Hysteroscopy With Endometrial Ablation    OTHER SURGICAL HISTORY  2013    Laparoscopic Excision Left Fallopian Tube Cyst (___ Cm)    TOTAL ABDOMINAL HYSTERECTOMY  2013    Total Abdominal Hysterectomy    TUBAL LIGATION  2013    Tubal Ligation       Social History     Socioeconomic History    Marital status:      Spouse name: None    Number of children: None    Years of education: None    Highest education level: None   Occupational History    None   Tobacco Use    Smoking status: Former     Packs/day: 1     Types: Cigarettes     Quit date:      Years since quittin.8     Passive exposure: Never    Smokeless tobacco: Never   Vaping Use    Vaping Use: Never used   Substance and Sexual  Activity    Alcohol use: Not Currently     Alcohol/week: 2.0 standard drinks of alcohol     Types: 2 Glasses of wine per week     Comment: social    Drug use: Never    Sexual activity: None   Other Topics Concern    None   Social History Narrative    None     Social Determinants of Health     Financial Resource Strain: Not on file   Food Insecurity: Not on file   Transportation Needs: Not on file   Physical Activity: Not on file   Stress: Not on file   Social Connections: Not on file   Intimate Partner Violence: Not on file   Housing Stability: Not on file       Family History   Problem Relation Name Age of Onset    Hypertension Mother      Diabetes type II Father      Prostate cancer Father      Breast cancer Sister         Allergies:   No Known Allergies      Current Outpatient Medications:     anastrozole (Arimidex) 1 mg tablet, Take 1 tablet (1 mg total) by mouth once daily., Disp: , Rfl:     diphenoxylate-atropine (Lomotil) 2.5-0.025 mg tablet, Take 2 tablets by mouth every 6 hours if needed for diarrhea., Disp: , Rfl:     hydrocortisone 2.5 % cream, Apply topically 2 times a day. to affected area, Disp: , Rfl:     omeprazole (PriLOSEC) 20 mg DR capsule, Take 1 capsule (20 mg) by mouth once daily., Disp: , Rfl:     ondansetron (Zofran) 4 mg tablet, Take 1-2 tablets (4-8 mg) by mouth every 8 hours if needed for nausea or vomiting (from chemotherapy)., Disp: , Rfl:     oxyCODONE-acetaminophen (Percocet) 5-325 mg tablet, Take 1 tablet by mouth if needed (Every 4 to 6 hours)., Disp: , Rfl:     potassium chloride CR 20 mEq ER tablet, Take 2 tablets (40 mEq) by mouth 2 times a day., Disp: 120 tablet, Rfl: 0    prochlorperazine (Compazine) 10 mg tablet, 1 TAB(S) ORALLY EVERY 6 HOURS, AS NEEDED FOR NAUSEA/VOMITING., Disp: , Rfl:      Objective    BSA: 1.98 meters squared  /86 (BP Location: Left arm, Patient Position: Sitting, BP Cuff Size: Adult)   Pulse 76   Temp 35.9 °C (96.6 °F)   Resp 18   Wt 88.5 kg (195  lb 1.7 oz)   SpO2 93%   BMI 35.01 kg/m²     ECOG Performance Status:  The ECOG performance scale today is ECO- Restricted in physically strenuous activity.  Carries out light duty.    Physical Exam  Constitutional:       General: She is not in acute distress.     Appearance: Normal appearance.   HENT:      Head: Normocephalic and atraumatic.   Eyes:      General: No scleral icterus.     Conjunctiva/sclera: Conjunctivae normal.   Cardiovascular:      Rate and Rhythm: Normal rate and regular rhythm.      Heart sounds: No murmur heard.  Pulmonary:      Effort: Pulmonary effort is normal. No respiratory distress.      Breath sounds: Normal breath sounds.   Musculoskeletal:         General: No tenderness or deformity. Normal range of motion.      Cervical back: Normal range of motion and neck supple. No rigidity.      Right lower leg: No edema.      Left lower leg: No edema.   Lymphadenopathy:      Cervical: No cervical adenopathy.   Skin:     General: Skin is warm and dry.      Coloration: Skin is not jaundiced.      Findings: No rash.   Neurological:      Mental Status: She is alert and oriented to person, place, and time.      Gait: Gait normal.   Psychiatric:         Mood and Affect: Mood normal.         Behavior: Behavior normal.         Thought Content: Thought content normal.         Judgment: Judgment normal.         Laboratory Data:  Lab Results   Component Value Date    WBC 8.0 10/24/2023    HGB 13.3 10/24/2023    HCT 39.4 10/24/2023    MCV 95 10/24/2023     10/24/2023       Chemistry    Lab Results   Component Value Date/Time     10/24/2023 1331    K 3.1 (L) 10/24/2023 1331     10/24/2023 1331    CO2 26 10/24/2023 1331    BUN 9 10/24/2023 1331    CREATININE 0.59 10/24/2023 1331    Lab Results   Component Value Date/Time    CALCIUM 9.4 10/24/2023 1331    ALKPHOS 92 10/24/2023 1331    AST 40 (H) 10/24/2023 1331    ALT 22 10/24/2023 1331    BILITOT 0.8 10/24/2023 1331            Radiology:  ECHO (10/4/23):  CONCLUSIONS:   1. Left ventricular systolic function is normal with a 65% estimated ejection fraction.   2. Spectral Doppler shows an impaired relaxation pattern of left ventricular diastolic filling.   3. Strain values are normal, which imply normal myocardial function.    Pathology: N/A      Assessment/Plan:  Yvrose John is a 58 y.o. female with a history of right ER+/Her2+ breast cancer, who presents today for follow-up evaluation.    Breast cancer metastasized to axillary lymph node, right (CMS/HCC)  - Today marks cycle #14 (last) of TDM1 (Kadcyla).  - Continue anastrozole 1 mg daily.  - Grade 1 hot flashes, stable, will monitor.  - ECHO with normal/stable LVEF 10/4/23. No further ECHO's required.  - Mammogram scheduled 6/2024 with Dr. Maldonado.  - DEXA 7/2023 normal.  - IR consult for port removal.    Lymphedema  - Following with PT. Improved somewhat, will monitor.    Hypokalemia  - Likely secondary to TDM1, despite absence of diarrhea.  - Continue Kcl 40 mEq BID. Repeat CMP in ~6 weeks, then likely will wean off supplementation.     Disposition.  - RTC ~3-4 months.  - She has our contact information and was instructed to call with concerns/questions in the interim.    Orders Placed This Encounter   Procedures    Consult to Interventional Radiology    Protime-INR    APTT    Comprehensive Metabolic Panel    CBC and Auto Differential    APTT        Danny Chowdary MD  Hematology and Medical Oncology  [facility]

## 2023-10-29 NOTE — ASSESSMENT & PLAN NOTE
- Likely secondary to TDM1, despite absence of diarrhea.  - Continue Kcl 40 mEq BID. Repeat CMP in ~6 weeks, then likely will wean off supplementation.

## 2023-10-29 NOTE — ASSESSMENT & PLAN NOTE
- Today marks cycle #14 (last) of TDM1 (Kadcyla).  - Continue anastrozole 1 mg daily.  - Grade 1 hot flashes, stable, will monitor.  - ECHO with normal/stable LVEF 10/4/23. No further ECHO's required.  - Mammogram scheduled 6/2024 with Dr. Maldonado.  - DEXA 7/2023 normal.  - IR consult for port removal.

## 2023-10-30 ENCOUNTER — LAB (OUTPATIENT)
Dept: LAB | Facility: LAB | Age: 58
End: 2023-10-30
Payer: COMMERCIAL

## 2023-10-30 ENCOUNTER — APPOINTMENT (OUTPATIENT)
Dept: OCCUPATIONAL THERAPY | Facility: CLINIC | Age: 58
End: 2023-10-30
Payer: COMMERCIAL

## 2023-10-30 DIAGNOSIS — C77.3 BREAST CANCER METASTASIZED TO AXILLARY LYMPH NODE, RIGHT (MULTI): ICD-10-CM

## 2023-10-30 DIAGNOSIS — C50.911 BREAST CANCER METASTASIZED TO AXILLARY LYMPH NODE, RIGHT (MULTI): ICD-10-CM

## 2023-10-30 LAB — APTT PPP: 34 SECONDS (ref 27–38)

## 2023-10-30 PROCEDURE — 85730 THROMBOPLASTIN TIME PARTIAL: CPT

## 2023-10-30 PROCEDURE — 36415 COLL VENOUS BLD VENIPUNCTURE: CPT

## 2023-11-02 ENCOUNTER — TREATMENT (OUTPATIENT)
Dept: OCCUPATIONAL THERAPY | Facility: CLINIC | Age: 58
End: 2023-11-02
Payer: COMMERCIAL

## 2023-11-02 DIAGNOSIS — I89.0 LYMPHEDEMA: ICD-10-CM

## 2023-11-02 PROCEDURE — 97530 THERAPEUTIC ACTIVITIES: CPT | Mod: GO,59

## 2023-11-02 PROCEDURE — 97140 MANUAL THERAPY 1/> REGIONS: CPT | Mod: GO

## 2023-11-02 NOTE — PROGRESS NOTES
"Occupational Therapy    Occupational Therapy Treatment    Name: Yvrose John  MRN: [id number]  : 1965  Date: 23  Time Calculation  Start Time: 731  Stop Time: 827  Time Calculation (min): 56 min    Assessment:     Plan:       Subjective   \" Heaven noticed my breast is starting to drop more because it is getting looser\"    General:  OT Last Visit  OT Received On: 10/26/23     Pain Assessment:       Objective    A   Theapy/Activity:      Therapeutic Activity  Therapeutic Activity Performed: Yes  Therapeutic Activity 1: pt completed pump trial with date with flexitouch with good decreased fibrosis noted on lateral side of breast, upper chest and arm    Manual Therapy  Manual Therapy Performed: Yes  Manual Therapy Activity 3: girth measurements with good volume reduction noted     Lymphedema Assessment    Lymphedema Assessments:  Lymphedema Assessments: Upper extremities  Right Upper Extremity:  R Thumb Distal Phalange Girth: 6 cm  R Thumb Proximal Phalange Girth: 8 cm  R Metacarpals (cm): 18.3 cm  R Palm (cm): 0 cm  R Wrist (cm): 16.5 cm  R 10 cm Above Wrist (cm): 23.7 cm  R 15 cm Above Wrist (cm): 27 cm  R 20 cm Above Wrist (cm): 27 cm  R Elbow (cm): 27.3 cm  R 5 cm Above Elbow: 32.5 cm  R 10 cm Above Elbow: 35 cm  R 20 cm Above Elbow: 37 cm  R Axilla: 0 cm  R Upper Extremity Total: 258.3  Left Upper Extremity:  L Thumb Distal Phalange Girth: 6 cm  L Thumb Proximal Phalange Girth: 7 cm  L Metacarpals (cm): 18.3 cm  L Palm (cm): 0 cm  L Wrist (cm): 16 cm  L 10 cm Above Wrist (cm): 23.8 cm  L 15 cm Above Wrist (cm): 25.5 cm  L 20 cm Above Wrist (cm): 26.5 cm  L Elbow (cm): 26.5 cm  L 5 cm Above Elbow: 30 cm  L 10cm Above Elbow: 32.5  L 20 cm Above Elbow: 35  L Axilla: 0 cm  Left upper extremity total: 247.1  UE Skin Appearance/Condition and Girth:  Other (comment): axillary  105.5 cm increased 1.5 cm , nipple 120 cm increased 2.5 cm     Pain Assessment:     Therapy Precautions:     OP EDUCATION:   "     Goals:  Active       Lymphedema       Pt will be I with stating and demonstrating home exercise program in order to improve patients ability to perform self-care, household, work and/or leisure tasks.        Start:  10/17/23    Expected End:  12/05/23            Pt will be able to perform self care, household, work and or leisure tasks with   0-2 /10 pain rating, 100 % of the time        Start:  10/17/23    Expected End:  12/05/23            Pt will demo 5% cm  of volume reduction with RUE in order for proper fitting of medical compression garments        Start:  10/17/23    Expected End:  12/05/23            Pt will improve active ROM in order to perform self-care, household, work and/or leisure tasks.  R shoulder flex 155, ext 45, abd 165, IR T9,        Start:  10/17/23    Expected End:  12/05/23            Pt will improve strengthening in order to perform self-care, household, work and/or leisure tasks.  R shoulder 5/5 mmt in all planes of movement        Start:  10/17/23    Expected End:  12/05/23            Pt will I perform skin care for infection prevention and integrity of skin        Start:  10/17/23    Expected End:  12/05/23            I with donning/doffing medical compression garments        Start:  10/17/23    Expected End:  12/05/23

## 2023-11-03 ENCOUNTER — HOSPITAL ENCOUNTER (OUTPATIENT)
Dept: RADIATION ONCOLOGY | Facility: CLINIC | Age: 58
Setting detail: RADIATION/ONCOLOGY SERIES
Discharge: STILL ADMITTED OR ADMINISTRATIVE | End: 2023-11-03
Payer: COMMERCIAL

## 2023-11-03 VITALS
RESPIRATION RATE: 18 BRPM | TEMPERATURE: 98.1 F | DIASTOLIC BLOOD PRESSURE: 83 MMHG | BODY MASS INDEX: 35.72 KG/M2 | SYSTOLIC BLOOD PRESSURE: 134 MMHG | WEIGHT: 199.08 LBS | OXYGEN SATURATION: 95 % | HEART RATE: 85 BPM

## 2023-11-03 DIAGNOSIS — I89.0 LYMPHEDEMA: ICD-10-CM

## 2023-11-03 DIAGNOSIS — C50.911 BREAST CANCER METASTASIZED TO AXILLARY LYMPH NODE, RIGHT (MULTI): Primary | ICD-10-CM

## 2023-11-03 DIAGNOSIS — C77.3 BREAST CANCER METASTASIZED TO AXILLARY LYMPH NODE, RIGHT (MULTI): Primary | ICD-10-CM

## 2023-11-03 PROCEDURE — 99214 OFFICE O/P EST MOD 30 MIN: CPT | Performed by: NURSE PRACTITIONER

## 2023-11-03 RX ORDER — VITAMIN E MIXED 400 UNIT
400 CAPSULE ORAL 2 TIMES DAILY
Qty: 60 CAPSULE | Refills: 5 | Status: SHIPPED | OUTPATIENT
Start: 2023-11-03 | End: 2024-05-09

## 2023-11-03 RX ORDER — PENTOXIFYLLINE 400 MG/1
400 TABLET, EXTENDED RELEASE ORAL
Qty: 90 TABLET | Refills: 5 | Status: SHIPPED | OUTPATIENT
Start: 2023-11-03

## 2023-11-03 ASSESSMENT — PAIN SCALES - GENERAL: PAINLEVEL: 0-NO PAIN

## 2023-11-03 NOTE — PROGRESS NOTES
Radiation Oncology Follow-Up    Patient Name:  Yvrose John  MRN:  [id number]  :  1965    Referring Provider: No ref. provider found  Primary Care Provider: Garfield Pal DO  Care Team: Patient Care Team:  Garfield Pal DO as PCP - General  Garfield Pal DO as Primary Care Provider  Danny Chowdary MD as Consulting Physician (Hematology and Oncology)    Date of Service: 11/3/2023     Cancer Staging:          Breast         AJCC Edition: 8th (AJCC), Diagnosis Date: 2022, Stage(no match), ypT1c ypN2a cM0  G2     Treatment Synopsis:    58-year-old female with clinical T2 N1 M0 invasive ductal carcinoma of the right breast status post neoadjuvant TCHP x1 followed by THP x5 followed by a right partial  mastectomy with sentinel lymph node biopsy     Treatment Summary:     - Pt initially appreciated a mass in her right breast in early May.  There was also a triangular-shaped rash around her right nipple.       - On 2022 she underwent a mammogram which revealed an irregular mass in the superior right breast with pleomorphic calcifications as well as an oval mass in the inferior right breast which was slightly more prominent.  She underwent a right breast  ultrasound the same day directed at the 12 o'clock position, 11 cm from the nipple which revealed a 1.2 x 1.4 x 1.2 cm hypoechoic mass.  At the 7 o'clock position, 11 cm from the nipple was a benign-appearing intramammary lymph node.  Axillary ultrasound  revealed multiple abnormal lymph nodes the largest measuring 2.3 x 1.8 x 3 cm.       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  Right breast core biopsy performed 6/3/2022 revealed invasive ductal carcinoma, grade 2-3.  Estrogen receptors stained positive at greater than  95%.  Progesterone receptors stained positive at 40%.  HER2/johnna was 3+ IHC.  Lymph node biopsy was consistent with metastatic carcinoma.        - She underwent a PET scan on 7/5/2022 which revealed a 2.5 x 1 cm right breast mass with 2 hypermetabolic right axillary lymph nodes.  She saw Dr. Mullnis who recommended neoadjuvant chemotherapy.  She received 1 cycle of TCHP which was complicated by  papilledema and the carboplatinum was discontinued.  She then received 5 additional cycles of THP.  Her chemotherapy was delivered between 7/1/2022 and 10/17/2022.       - CT scan of the chest abdomen and pelvis following chemotherapy on 11/11/2022 which revealed a nonspecific right lower lobe lung nodule as well as likely cysts in the liver.  There was resolution of the right axillary lymphadenopathy.  She had desired  mastectomies with reconstruction however had difficulty meeting with the plastic surgeon and decided to proceed with breast conservation so as to not delay her care.       - On 11/30/2022 she underwent a right partial mastectomy with sentinel lymph node biopsy and completion axillary lymph node dissection.  Pathology revealed a 1.4 cm invasive ductal carcinoma, grade 2.  No angiolymphatic invasion was present.  Ductal carcinoma  in situ of the solid and cribriform subtypes, nuclear grade 2 was present.  There was no element of extensive intraductal component.  The resection margins were negative.  Ductal carcinoma in situ came within 1 mm of the medial margin of resection.  4  sentinel lymph nodes were removed, all 3 of which contained metastatic disease with scattered deposits of tumor in the perinodal soft tissue.  An additional 14 axillary lymph nodes were removed, 1 of which contained metastatic disease.  Thus, the total  was 4 of 18 positive lymph nodes.  Original plan was to undergo a completion mastectomy with a risk reducing contralateral mastectomy and bilateral reconstruction on 1/25/2023.      - She saw Dr. Chowdary who did not want to delay her adjuvant chemotherapy any longer and felt that radiation also probably should not be delayed.        - 2/6/23 - 3/16/23: Radiation therapy to the right breast, axilla, SCV and regional lymph nodes consisting of a dose of 50 Gy with additional 10 Gy to the tumor bed for a total of 60 Gy.     - Adjuvant endocrine therapy with tamoxifen; switched to anastrozole    SUBJECTIVE  History of Present Illness:   Yvrose John is  here today for routine radiation follow up/initial radiation survivorship visit.  She is feeling well overall.  She says she recently completed Kadcyla infusions. She continues working full time as manager. Since her last visit, she has developed lymphedema of right breast with hardening of tissue/fibrosis.  She has been going to PT and is getting a lymphedema pump.  She thinks breast is starting to soften.  No swelling of right arm or difficulty with ROM. She is currently taking anastrozole. No significant side effects other that hot flashes. No headaches, fever, chills, cough, SOB, chest pain, GI  or  complaints and no bony pain.    Review of Systems:    Review of Systems   All other systems reviewed and are negative.    Performance Status:   The Karnofsky performance scale today is 90, Able to carry on normal activity; minor signs or symptoms of disease (ECOG equivalent 0).        OBJECTIVE  Vital Signs:  /83 (BP Location: Left arm, Patient Position: Sitting, BP Cuff Size: Adult)   Pulse 85   Temp 36.7 °C (98.1 °F) (Core)   Resp 18   Wt 90.3 kg (199 lb 1.2 oz)   SpO2 95%   BMI 35.72 kg/m²      Current Outpatient Medications:     anastrozole (Arimidex) 1 mg tablet, Take 1 tablet (1 mg total) by mouth once daily., Disp: , Rfl:     diphenoxylate-atropine (Lomotil) 2.5-0.025 mg tablet, Take 2 tablets by mouth every 6 hours if needed for diarrhea., Disp: , Rfl:     hydrocortisone 2.5 % cream, Apply topically 2 times a day. to affected area, Disp: , Rfl:     omeprazole (PriLOSEC) 20 mg DR capsule, Take 1 capsule (20 mg) by mouth once daily., Disp: , Rfl:     ondansetron (Zofran)  4 mg tablet, Take 1-2 tablets (4-8 mg) by mouth every 8 hours if needed for nausea or vomiting (from chemotherapy)., Disp: , Rfl:     oxyCODONE-acetaminophen (Percocet) 5-325 mg tablet, Take 1 tablet by mouth if needed (Every 4 to 6 hours)., Disp: , Rfl:     potassium chloride CR 20 mEq ER tablet, Take 2 tablets (40 mEq) by mouth 2 times a day., Disp: 120 tablet, Rfl: 0    prochlorperazine (Compazine) 10 mg tablet, 1 TAB(S) ORALLY EVERY 6 HOURS, AS NEEDED FOR NAUSEA/VOMITING., Disp: , Rfl:    Physical Exam:  Physical Exam  Constitutional:       Appearance: Normal appearance.   HENT:      Head: Normocephalic and atraumatic.      Nose: Nose normal.      Mouth/Throat:      Mouth: Mucous membranes are moist.      Pharynx: Oropharynx is clear.   Eyes:      Conjunctiva/sclera: Conjunctivae normal.      Pupils: Pupils are equal, round, and reactive to light.   Cardiovascular:      Rate and Rhythm: Normal rate.      Heart sounds: Murmur heard.      No gallop.   Pulmonary:      Effort: Pulmonary effort is normal.      Breath sounds: Normal breath sounds.   Chest:   Breasts:     Right: Normal. No inverted nipple, mass or nipple discharge.      Left: Normal. No swelling, inverted nipple, mass, nipple discharge or tenderness.      Comments: Right breast with well healed surgical incision.  Breast lymphedema with fibrosis and peau d'orange texture.  Abdominal:      Palpations: Abdomen is soft.   Musculoskeletal:         General: No swelling. Normal range of motion.      Cervical back: Normal range of motion.   Lymphadenopathy:      Cervical: No cervical adenopathy.      Upper Body:      Right upper body: No supraclavicular or axillary adenopathy.      Left upper body: No supraclavicular or axillary adenopathy.   Skin:     General: Skin is warm and dry.   Neurological:      General: No focal deficit present.      Mental Status: She is alert and oriented to person, place, and time.   Psychiatric:         Mood and Affect: Mood  normal.         Behavior: Behavior normal.         RESULTS:     Narrative & Impression   Interpreted By:  RANDALL GAMEZ MD  MRN: [id number]  Patient Name: ARUN PAIZ     STUDY:  DIGITAL DIAG MAMM BILAT WITH JOSE;  6/2/2023 11:09 am     ACCESSION NUMBER(S):  [id number]     ORDERING CLINICIAN:  GISELE MALDONADO     INDICATION:  Right lumpectomy, chemotherapy, and radiation.     COMPARISON:  11/30/2022, 05/24/2022, 06/10/2013, and priors dating back to 2012     FINDINGS:  2D and tomosynthesis images were reviewed at 1 mm slice thickness.     There are areas of scattered fibroglandular tissue. Interval  postsurgical parenchymal scarring and surgical clips are seen in the  superolateral right breast at middle to posterior depth. Diffuse  marked right breast skin and trabecular thickening is present, likely  representing post radiation/postoperative changes.   No suspicious  masses or calcifications are identified in either breast.     IMPRESSION:  No mammographic evidence of malignancy.  Interval right breast  posttreatment changes. Marked right breast skin and trabecular  thickening, likely post radiation/postoperative changes, for which  clinical correlation is recommended.     BI-RADS CATEGORY:     Category: 2 - Benign.  Recommendation: Annual mammogram       ASSESSMENT:  58 y.o. female with triple positive right sided breast cancer s/p neoadjuvant chemotherapy followed by breast conserving surgery followed by radiation therapy. Now with breast lymphedema, fibrosis possibly due to recall reaction of radiation with Kadcyla (conjugate).  Discussed and examined with Dr. Padilla.  She will continue OT and also will start on pentoxifylline and Vit E.  RX sent to her pharmacy. She has completed Kadcyla and will follow up with Dr. Chowdary in med onc. Follow up with Dr. Maldonado for mammograms.   Radiation follow up in 6 mo. Call with any questions or concerns or if breast is not improving or worsens.           Moraima Osman  CNP  [phone]

## 2023-11-06 ENCOUNTER — TREATMENT (OUTPATIENT)
Dept: OCCUPATIONAL THERAPY | Facility: CLINIC | Age: 58
End: 2023-11-06
Payer: COMMERCIAL

## 2023-11-06 DIAGNOSIS — I89.0 LYMPHEDEMA: ICD-10-CM

## 2023-11-06 PROCEDURE — 97140 MANUAL THERAPY 1/> REGIONS: CPT | Mod: GO

## 2023-11-06 PROCEDURE — 97110 THERAPEUTIC EXERCISES: CPT | Mod: GO

## 2023-11-06 NOTE — PROGRESS NOTES
"Occupational Therapy    Occupational Therapy Treatment    Name: Yvrose John  MRN: [id number]  : 1965  Date: 23  Time Calculation  Start Time: 0800  Stop Time: 0855  Time Calculation (min): 55 min    Assessment:  decreased pain, increased tissue pliability      Plan:  assess shoulder pain on RUE, continue POC as tolerated,         Subjective   \" I went to the doctor last week and she  my arm and checked out the lymph nodes and every since my shoulder is hurting me really bad\"    General:  OT Last Visit  OT Received On: 10/26/23          Objective       Therapy/Activity: Therapeutic Exercise  Therapeutic Exercise Performed: Yes  Therapeutic Exercise Activity 1: shoulder rolls x 10  Therapeutic Exercise Activity 2: isometrics IR 6 sec hold against wall with ball  Therapeutic Exercise Activity 3: [employer] ER 6 sec hold x10  Therapeutic Exercise Activity 4: self ROM flexion 6 sec hold x 10  Therapeutic Exercise Activity 5: self ROM abduction 6 sec hold x 10 reps  Therapeutic Exercise Activity 6: pendelums 4# 2 minutes         Manual Therapy  Manual Therapy Performed: Yes  Manual Therapy Activity 1: hivamat to R shoulder on anterior and posterior side and upper chest in order to promote ROM and decrease pain  Manual Therapy Activity 2: assesed tissue on R breast with increased tissue pliability, decreased redness and reshaping of breast  Manual Therapy Activity 3: use previous measurements due to pain in shoulder     Lymphedema Assessment    Lymphedema Assessments:  Lymphedema Assessments: Upper extremities  Edema - Fibrotic: Y  Edema - Pitting: Y  Hemosiderin Staining: Y  Hyperkeratosis: Y  Hyperpigmentation: Y       OP EDUCATION:  Education  Individual(s) Educated: Patient  Education Provided: Lymphedema, Anatomy & Physiology  Home Program: PROM, AAROM, AROM, Handout issued  Education Comment: edu pt on proper form with shoulder exercises in order to promote pain free ROM.    Goals:  Active  "      Lymphedema       Pt will be I with stating and demonstrating home exercise program in order to improve patients ability to perform self-care, household, work and/or leisure tasks.        Start:  10/17/23    Expected End:  12/05/23            Pt will be able to perform self care, household, work and or leisure tasks with   0-2 /10 pain rating, 100 % of the time        Start:  10/17/23    Expected End:  12/05/23            Pt will demo 5% cm  of volume reduction with RUE in order for proper fitting of medical compression garments        Start:  10/17/23    Expected End:  12/05/23            Pt will improve active ROM in order to perform self-care, household, work and/or leisure tasks.  R shoulder flex 155, ext 45, abd 165, IR T9,        Start:  10/17/23    Expected End:  12/05/23            Pt will improve strengthening in order to perform self-care, household, work and/or leisure tasks.  R shoulder 5/5 mmt in all planes of movement        Start:  10/17/23    Expected End:  12/05/23            Pt will I perform skin care for infection prevention and integrity of skin        Start:  10/17/23    Expected End:  12/05/23            I with donning/doffing medical compression garments        Start:  10/17/23    Expected End:  12/05/23

## 2023-11-09 ENCOUNTER — TREATMENT (OUTPATIENT)
Dept: OCCUPATIONAL THERAPY | Facility: CLINIC | Age: 58
End: 2023-11-09
Payer: COMMERCIAL

## 2023-11-09 DIAGNOSIS — I89.0 LYMPHEDEMA: ICD-10-CM

## 2023-11-09 PROCEDURE — 97140 MANUAL THERAPY 1/> REGIONS: CPT | Mod: GO

## 2023-11-09 PROCEDURE — 97110 THERAPEUTIC EXERCISES: CPT | Mod: GO

## 2023-11-09 ASSESSMENT — PAIN SCALES - GENERAL: PAINLEVEL_OUTOF10: 4

## 2023-11-09 ASSESSMENT — PAIN DESCRIPTION - DESCRIPTORS: DESCRIPTORS: ACHING;BURNING

## 2023-11-09 ASSESSMENT — PAIN - FUNCTIONAL ASSESSMENT: PAIN_FUNCTIONAL_ASSESSMENT: 0-10

## 2023-11-09 NOTE — PROGRESS NOTES
Occupational Therapy    Occupational Therapy Treatment    Name: Yvrose John  MRN: [id number]  : 1965  Date: 23  Time Calculation  Start Time: 731  Stop Time: 828  Time Calculation (min): 57 min    Assessment: increased tissue pliability, edema reduction, increased ROM      Plan: POC as tolerated, monitor home program        Subjective   General:  OT Last Visit  OT Received On: 23       Objective       Therapy/Activity: Therapeutic Exercise  Therapeutic Exercise Performed: Yes  Therapeutic Exercise Activity 1: shoulder rolls x 10  Therapeutic Exercise Activity 2: supine horinzontal add/abd with shoulder 90 degree 10 reps with edu for 5 sec  hold for stretching with 3/10 pain report  Therapeutic Exercise Activity 3: AAROM pec minor and pec major 10 sec, 5 reps  Therapeutic Exercise Activity 4: self ROM flexion 6 sec hold x 10  Therapeutic Exercise Activity 5: self ROM abduction 6 sec hold x 10 reps  Therapeutic Exercise Activity 6: pendelums 4# 2 minutes    Therapeutic Activity  Therapeutic Activity Performed: Yes    Manual Therapy  Manual Therapy Performed: Yes  Manual Therapy Activity 1: completed hivamat on R breast, axillary and chest area with MLD tech for lymph stimulation and edema reduction  Manual Therapy Activity 2: girth measurements with volume reduction on BUE, axillary area  Manual Therapy Activity 3: trigger release with pec minor and pec major  Lymphedema Assessment    Lymphedema Assessments:  Lymphedema Assessments: Upper extremities  Right Upper Extremity:  R Thumb Distal Phalange Girth: 5.8 cm  R Thumb Proximal Phalange Girth: 6.8 cm  R Metacarpals (cm): 18 cm  R Palm (cm): 0 cm  R Wrist (cm): 16.5 cm  R 10 cm Above Wrist (cm): 24 cm  R 15 cm Above Wrist (cm): 26.5 cm  R 20 cm Above Wrist (cm): 26.8 cm  R Elbow (cm): 26.5 cm  R 5 cm Above Elbow: 32 cm  R 10 cm Above Elbow: 34 cm  R 20 cm Above Elbow: 37 cm  R Axilla: 0 cm  R Upper Extremity Total: 253.9  Left Upper  Extremity:  L Thumb Distal Phalange Girth: 5.8 cm  L Thumb Proximal Phalange Girth: 7 cm  L Metacarpals (cm): 18.5 cm  L Palm (cm): 0 cm  L Wrist (cm): 15.5 cm  L 10 cm Above Wrist (cm): 23.8 cm  L 15 cm Above Wrist (cm): 25.5 cm  L 20 cm Above Wrist (cm): 25.5 cm  L Elbow (cm): 25.5 cm  L 5 cm Above Elbow: 30 cm  L 10cm Above Elbow: 32  L 20 cm Above Elbow: 35  L Axilla: 0 cm  Left upper extremity total: 244.1  UE Skin Appearance/Condition and Girth:  Other (comment): axillary 103.5 cm, nipple 117 cm, decreased fibrosis noted on R breast with breast reshaping (Trentol pt starting taking 11/6 400 mg daily and vitamin E)  Upper Extremity Evaluation:     Pain Assessment:  Pain Assessment: 0-10  Pain Score: 4  Pain Type: Acute pain  Pain Location: Shoulder  Pain Orientation: Right  Pain Descriptors: Aching, Burning  Pain Frequency: Intermittent  Therapy Precautions:  Precautions Comment: NA    OP EDUCATION:  Education  Individual(s) Educated: Patient  Education Provided: Lymphedema, Anatomy & Physiology  Home Program: PROM, AAROM, AROM, Handout issued  Education Comment: edu pt on proper form with shoulder exercises in order to promote pain free ROM.    Goals:  Active       Lymphedema       Pt will be I with stating and demonstrating home exercise program in order to improve patients ability to perform self-care, household, work and/or leisure tasks.        Start:  10/17/23    Expected End:  12/05/23            Pt will be able to perform self care, household, work and or leisure tasks with   0-2 /10 pain rating, 100 % of the time        Start:  10/17/23    Expected End:  12/05/23            Pt will demo 5% cm  of volume reduction with RUE in order for proper fitting of medical compression garments        Start:  10/17/23    Expected End:  12/05/23            Pt will improve active ROM in order to perform self-care, household, work and/or leisure tasks.  R shoulder flex 155, ext 45, abd 165, IR T9,        Start:   10/17/23    Expected End:  12/05/23            Pt will improve strengthening in order to perform self-care, household, work and/or leisure tasks.  R shoulder 5/5 mmt in all planes of movement        Start:  10/17/23    Expected End:  12/05/23            Pt will I perform skin care for infection prevention and integrity of skin        Start:  10/17/23    Expected End:  12/05/23            I with donning/doffing medical compression garments        Start:  10/17/23    Expected End:  12/05/23

## 2023-11-13 ENCOUNTER — TREATMENT (OUTPATIENT)
Dept: OCCUPATIONAL THERAPY | Facility: CLINIC | Age: 58
End: 2023-11-13
Payer: COMMERCIAL

## 2023-11-13 DIAGNOSIS — I89.0 LYMPHEDEMA: ICD-10-CM

## 2023-11-13 PROCEDURE — 97140 MANUAL THERAPY 1/> REGIONS: CPT | Mod: GO

## 2023-11-13 PROCEDURE — 97110 THERAPEUTIC EXERCISES: CPT | Mod: GO

## 2023-11-13 ASSESSMENT — PAIN - FUNCTIONAL ASSESSMENT: PAIN_FUNCTIONAL_ASSESSMENT: 0-10

## 2023-11-13 ASSESSMENT — PAIN DESCRIPTION - DESCRIPTORS: DESCRIPTORS: DULL;DISCOMFORT

## 2023-11-13 ASSESSMENT — PAIN SCALES - GENERAL: PAINLEVEL_OUTOF10: 1

## 2023-11-13 NOTE — PROGRESS NOTES
"Occupational Therapy    Occupational Therapy Treatment    Name: Yvrose John  MRN: [id number]  : 1965  Date: 23  Time Calculation  Start Time: 0800  Stop Time: 0856  Time Calculation (min): 56 min    Assessment:     Plan:       Subjective   \" my shoulder is feeling better\"    General:  OT Last Visit  OT Received On: 23    Objective       Therapy/Activity: Therapeutic Exercise  Therapeutic Exercise Performed: Yes  Therapeutic Exercise Activity 1: serratus punches in supine with 3# 10 reps 6 sec hold  Therapeutic Exercise Activity 2: supine horinzontal add/abd with shoulder 90 degree 10 reps with edu for 5 sec  hold for stretching with 3/10 pain report  Therapeutic Exercise Activity 3: 3# flexion in supine with support position transitioning to full ROM      Therapeutic Activity  Therapeutic Activity Performed: Yes    Manual Therapy  Manual Therapy Performed: Yes  Manual Therapy Activity 1: edu pt on self MLD with light sustained touch and stationary circles for lymph flow.  pt required vcing and tactile cuing for accuracy  Manual Therapy Activity 2: girth measurements with marginal changes in BUE    Pt requires medical compression  bra due to severe fibrosis in breast 2' radiation and chemo resulting in poor ROM and mobility in Shoulder limiting her participation in ADLs and IADLs      Lymphedema Assessment    Lymphedema Assessments:  Lymphedema Assessments: Upper extremities  Right Upper Extremity:  R Thumb Distal Phalange Girth: 5.8 cm  R Thumb Proximal Phalange Girth: 7 cm  R Metacarpals (cm): 18.5 cm  R Palm (cm): 0 cm  R Wrist (cm): 16.5 cm  R 10 cm Above Wrist (cm): 23.5 cm  R 15 cm Above Wrist (cm): 26.5 cm  R 20 cm Above Wrist (cm): 27 cm  R Elbow (cm): 27 cm  R 5 cm Above Elbow: 32 cm  R 10 cm Above Elbow: 33.5 cm  R 20 cm Above Elbow: 37 cm  R Axilla: 0 cm  R Upper Extremity Total: 254.3  Left Upper Extremity:  L Thumb Distal Phalange Girth: 5.5 cm  L Thumb Proximal Phalange Girth: " 6.8 cm  L Metacarpals (cm): 18.5 cm  L Palm (cm): 0 cm  L Wrist (cm): 15.7 cm  L 10 cm Above Wrist (cm): 23.5 cm  L 15 cm Above Wrist (cm): 25.3 cm  L 20 cm Above Wrist (cm): 26 cm  L Elbow (cm): 25.5 cm  L 5 cm Above Elbow: 30 cm  L 10cm Above Elbow: 32  L 20 cm Above Elbow: 35  L Axilla: 0 cm  Left upper extremity total: 243.8  UE Skin Appearance/Condition and Girth:  Other (comment): axillary 104 cm no change, nipple 117 cm, decreased fibrosis noted on R breast with breast reshaping, 107.5 cm abdominal area increased 1 cm from last measurements     Pain Assessment:  Pain Assessment: 0-10  Pain Score: 1  Pain Type: Chronic pain  Pain Location: Shoulder  Pain Orientation: Right  Pain Descriptors: Dull, Discomfort  Pain Frequency: Intermittent  Therapy Precautions:         OP EDUCATION:       Goals:  Active       Lymphedema       Pt will be I with stating and demonstrating home exercise program in order to improve patients ability to perform self-care, household, work and/or leisure tasks.        Start:  10/17/23    Expected End:  12/05/23            Pt will be able to perform self care, household, work and or leisure tasks with   0-2 /10 pain rating, 100 % of the time        Start:  10/17/23    Expected End:  12/05/23            Pt will demo 5% cm  of volume reduction with RUE in order for proper fitting of medical compression garments        Start:  10/17/23    Expected End:  12/05/23            Pt will improve active ROM in order to perform self-care, household, work and/or leisure tasks.  R shoulder flex 155, ext 45, abd 165, IR T9,        Start:  10/17/23    Expected End:  12/05/23            Pt will improve strengthening in order to perform self-care, household, work and/or leisure tasks.  R shoulder 5/5 mmt in all planes of movement        Start:  10/17/23    Expected End:  12/05/23            Pt will I perform skin care for infection prevention and integrity of skin        Start:  10/17/23    Expected End:   12/05/23            I with donning/doffing medical compression garments        Start:  10/17/23    Expected End:  12/05/23

## 2023-11-14 ENCOUNTER — APPOINTMENT (OUTPATIENT)
Dept: PREADMISSION TESTING | Facility: HOSPITAL | Age: 58
End: 2023-11-14
Payer: COMMERCIAL

## 2023-11-15 ENCOUNTER — HOSPITAL ENCOUNTER (OUTPATIENT)
Dept: CARDIOLOGY | Facility: HOSPITAL | Age: 58
Discharge: HOME | End: 2023-11-15
Payer: COMMERCIAL

## 2023-11-15 VITALS
WEIGHT: 190 LBS | HEART RATE: 84 BPM | SYSTOLIC BLOOD PRESSURE: 137 MMHG | OXYGEN SATURATION: 98 % | BODY MASS INDEX: 33.66 KG/M2 | RESPIRATION RATE: 18 BRPM | HEIGHT: 63 IN | TEMPERATURE: 98.2 F | DIASTOLIC BLOOD PRESSURE: 71 MMHG

## 2023-11-15 DIAGNOSIS — C77.3 BREAST CANCER METASTASIZED TO AXILLARY LYMPH NODE, RIGHT (MULTI): ICD-10-CM

## 2023-11-15 DIAGNOSIS — C50.911 BREAST CANCER METASTASIZED TO AXILLARY LYMPH NODE, RIGHT (MULTI): ICD-10-CM

## 2023-11-15 PROCEDURE — 2500000005 HC RX 250 GENERAL PHARMACY W/O HCPCS: Performed by: RADIOLOGY

## 2023-11-15 PROCEDURE — 77001 FLUOROGUIDE FOR VEIN DEVICE: CPT

## 2023-11-15 PROCEDURE — 2500000004 HC RX 250 GENERAL PHARMACY W/ HCPCS (ALT 636 FOR OP/ED): Performed by: RADIOLOGY

## 2023-11-15 PROCEDURE — 99153 MOD SED SAME PHYS/QHP EA: CPT

## 2023-11-15 PROCEDURE — 7100000009 HC PHASE TWO TIME - INITIAL BASE CHARGE

## 2023-11-15 PROCEDURE — 99152 MOD SED SAME PHYS/QHP 5/>YRS: CPT

## 2023-11-15 PROCEDURE — 7100000010 HC PHASE TWO TIME - EACH INCREMENTAL 1 MINUTE

## 2023-11-15 PROCEDURE — 36590 REMOVAL TUNNELED CV CATH: CPT

## 2023-11-15 RX ORDER — CEFAZOLIN SODIUM 1 G/50ML
1 SOLUTION INTRAVENOUS ONCE
OUTPATIENT
Start: 2023-11-15 | End: 2023-11-15

## 2023-11-15 RX ORDER — CEFAZOLIN SODIUM 2 G/50ML
2 SOLUTION INTRAVENOUS EVERY 8 HOURS
Status: DISCONTINUED | OUTPATIENT
Start: 2023-11-15 | End: 2023-11-16 | Stop reason: HOSPADM

## 2023-11-15 RX ORDER — MIDAZOLAM HYDROCHLORIDE 1 MG/ML
INJECTION, SOLUTION INTRAMUSCULAR; INTRAVENOUS AS NEEDED
Status: DISCONTINUED | OUTPATIENT
Start: 2023-11-15 | End: 2023-11-16 | Stop reason: HOSPADM

## 2023-11-15 RX ORDER — FENTANYL CITRATE 50 UG/ML
INJECTION, SOLUTION INTRAMUSCULAR; INTRAVENOUS AS NEEDED
Status: DISCONTINUED | OUTPATIENT
Start: 2023-11-15 | End: 2023-11-16 | Stop reason: HOSPADM

## 2023-11-15 RX ORDER — LIDOCAINE HYDROCHLORIDE 10 MG/ML
INJECTION, SOLUTION EPIDURAL; INFILTRATION; INTRACAUDAL; PERINEURAL AS NEEDED
Status: DISCONTINUED | OUTPATIENT
Start: 2023-11-15 | End: 2023-11-16 | Stop reason: HOSPADM

## 2023-11-15 RX ORDER — CEFAZOLIN SODIUM 2 G/100ML
INJECTION, SOLUTION INTRAVENOUS
Status: DISPENSED
Start: 2023-11-15 | End: 2023-11-15

## 2023-11-15 RX ADMIN — LIDOCAINE HYDROCHLORIDE 10 ML: 10 INJECTION, SOLUTION EPIDURAL; INFILTRATION; INTRACAUDAL; PERINEURAL at 10:08

## 2023-11-15 RX ADMIN — CEFAZOLIN SODIUM 2 G: 2 SOLUTION INTRAVENOUS at 09:30

## 2023-11-15 RX ADMIN — FENTANYL CITRATE 50 MCG: 50 INJECTION, SOLUTION INTRAMUSCULAR; INTRAVENOUS at 10:09

## 2023-11-15 RX ADMIN — MIDAZOLAM 2 MG: 1 INJECTION INTRAMUSCULAR; INTRAVENOUS at 10:07

## 2023-11-15 RX ADMIN — FENTANYL CITRATE 50 MCG: 50 INJECTION, SOLUTION INTRAMUSCULAR; INTRAVENOUS at 10:07

## 2023-11-15 ASSESSMENT — COLUMBIA-SUICIDE SEVERITY RATING SCALE - C-SSRS
2. HAVE YOU ACTUALLY HAD ANY THOUGHTS OF KILLING YOURSELF?: NO
1. IN THE PAST MONTH, HAVE YOU WISHED YOU WERE DEAD OR WISHED YOU COULD GO TO SLEEP AND NOT WAKE UP?: NO
6. HAVE YOU EVER DONE ANYTHING, STARTED TO DO ANYTHING, OR PREPARED TO DO ANYTHING TO END YOUR LIFE?: NO

## 2023-11-16 ENCOUNTER — APPOINTMENT (OUTPATIENT)
Dept: OCCUPATIONAL THERAPY | Facility: CLINIC | Age: 58
End: 2023-11-16
Payer: COMMERCIAL

## 2023-11-16 ENCOUNTER — APPOINTMENT (OUTPATIENT)
Dept: RADIOLOGY | Facility: HOSPITAL | Age: 58
DRG: 177 | End: 2023-11-16
Payer: COMMERCIAL

## 2023-11-16 ENCOUNTER — HOSPITAL ENCOUNTER (OUTPATIENT)
Facility: HOSPITAL | Age: 58
Setting detail: OBSERVATION
Discharge: HOME | DRG: 177 | End: 2023-11-17
Attending: STUDENT IN AN ORGANIZED HEALTH CARE EDUCATION/TRAINING PROGRAM | Admitting: STUDENT IN AN ORGANIZED HEALTH CARE EDUCATION/TRAINING PROGRAM
Payer: COMMERCIAL

## 2023-11-16 DIAGNOSIS — U07.1 COVID-19: Primary | ICD-10-CM

## 2023-11-16 LAB
ALBUMIN SERPL BCP-MCNC: 4 G/DL (ref 3.4–5)
ALP SERPL-CCNC: 74 U/L (ref 33–110)
ALT SERPL W P-5'-P-CCNC: 21 U/L (ref 7–45)
ANION GAP SERPL CALC-SCNC: 13 MMOL/L (ref 10–20)
AST SERPL W P-5'-P-CCNC: 39 U/L (ref 9–39)
BASOPHILS # BLD AUTO: 0.03 X10*3/UL (ref 0–0.1)
BASOPHILS NFR BLD AUTO: 0.4 %
BILIRUB SERPL-MCNC: 1 MG/DL (ref 0–1.2)
BUN SERPL-MCNC: 9 MG/DL (ref 6–23)
CALCIUM SERPL-MCNC: 9.2 MG/DL (ref 8.6–10.3)
CHLORIDE SERPL-SCNC: 101 MMOL/L (ref 98–107)
CO2 SERPL-SCNC: 24 MMOL/L (ref 21–32)
CREAT SERPL-MCNC: 0.79 MG/DL (ref 0.5–1.05)
CRP SERPL-MCNC: 1 MG/DL
D DIMER PPP FEU-MCNC: 763 NG/ML FEU
EOSINOPHIL # BLD AUTO: 0.02 X10*3/UL (ref 0–0.7)
EOSINOPHIL NFR BLD AUTO: 0.3 %
ERYTHROCYTE [DISTWIDTH] IN BLOOD BY AUTOMATED COUNT: 14.4 % (ref 11.5–14.5)
FERRITIN SERPL-MCNC: 60 NG/ML (ref 8–150)
FLUAV RNA RESP QL NAA+PROBE: NOT DETECTED
FLUBV RNA RESP QL NAA+PROBE: NOT DETECTED
GFR SERPL CREATININE-BSD FRML MDRD: 88 ML/MIN/1.73M*2
GLUCOSE SERPL-MCNC: 122 MG/DL (ref 74–99)
HCT VFR BLD AUTO: 41.3 % (ref 36–46)
HGB BLD-MCNC: 13.9 G/DL (ref 12–16)
IMM GRANULOCYTES # BLD AUTO: 0.02 X10*3/UL (ref 0–0.7)
IMM GRANULOCYTES NFR BLD AUTO: 0.3 % (ref 0–0.9)
LACTATE SERPL-SCNC: 1.1 MMOL/L (ref 0.4–2)
LYMPHOCYTES # BLD AUTO: 0.31 X10*3/UL (ref 1.2–4.8)
LYMPHOCYTES NFR BLD AUTO: 4.2 %
MCH RBC QN AUTO: 31.8 PG (ref 26–34)
MCHC RBC AUTO-ENTMCNC: 33.7 G/DL (ref 32–36)
MCV RBC AUTO: 95 FL (ref 80–100)
MONOCYTES # BLD AUTO: 1.01 X10*3/UL (ref 0.1–1)
MONOCYTES NFR BLD AUTO: 13.8 %
NEUTROPHILS # BLD AUTO: 5.94 X10*3/UL (ref 1.2–7.7)
NEUTROPHILS NFR BLD AUTO: 81 %
NRBC BLD-RTO: 0 /100 WBCS (ref 0–0)
PLATELET # BLD AUTO: 174 X10*3/UL (ref 150–450)
POTASSIUM SERPL-SCNC: 2.6 MMOL/L (ref 3.5–5.3)
PROT SERPL-MCNC: 6.6 G/DL (ref 6.4–8.2)
RBC # BLD AUTO: 4.37 X10*6/UL (ref 4–5.2)
SARS-COV-2 RNA RESP QL NAA+PROBE: DETECTED
SODIUM SERPL-SCNC: 135 MMOL/L (ref 136–145)
WBC # BLD AUTO: 7.3 X10*3/UL (ref 4.4–11.3)

## 2023-11-16 PROCEDURE — 94667 MNPJ CHEST WALL 1ST: CPT

## 2023-11-16 PROCEDURE — 85379 FIBRIN DEGRADATION QUANT: CPT

## 2023-11-16 PROCEDURE — 96375 TX/PRO/DX INJ NEW DRUG ADDON: CPT

## 2023-11-16 PROCEDURE — 87899 AGENT NOS ASSAY W/OPTIC: CPT | Mod: GEALAB

## 2023-11-16 PROCEDURE — 71275 CT ANGIOGRAPHY CHEST: CPT

## 2023-11-16 PROCEDURE — G0378 HOSPITAL OBSERVATION PER HR: HCPCS

## 2023-11-16 PROCEDURE — 2500000005 HC RX 250 GENERAL PHARMACY W/O HCPCS

## 2023-11-16 PROCEDURE — 2500000004 HC RX 250 GENERAL PHARMACY W/ HCPCS (ALT 636 FOR OP/ED)

## 2023-11-16 PROCEDURE — 82728 ASSAY OF FERRITIN: CPT

## 2023-11-16 PROCEDURE — 71275 CT ANGIOGRAPHY CHEST: CPT | Performed by: RADIOLOGY

## 2023-11-16 PROCEDURE — 2500000004 HC RX 250 GENERAL PHARMACY W/ HCPCS (ALT 636 FOR OP/ED): Performed by: STUDENT IN AN ORGANIZED HEALTH CARE EDUCATION/TRAINING PROGRAM

## 2023-11-16 PROCEDURE — 96365 THER/PROPH/DIAG IV INF INIT: CPT | Mod: 59

## 2023-11-16 PROCEDURE — 87040 BLOOD CULTURE FOR BACTERIA: CPT | Mod: GEALAB | Performed by: STUDENT IN AN ORGANIZED HEALTH CARE EDUCATION/TRAINING PROGRAM

## 2023-11-16 PROCEDURE — 36415 COLL VENOUS BLD VENIPUNCTURE: CPT | Performed by: STUDENT IN AN ORGANIZED HEALTH CARE EDUCATION/TRAINING PROGRAM

## 2023-11-16 PROCEDURE — 81001 URINALYSIS AUTO W/SCOPE: CPT | Performed by: STUDENT IN AN ORGANIZED HEALTH CARE EDUCATION/TRAINING PROGRAM

## 2023-11-16 PROCEDURE — 96372 THER/PROPH/DIAG INJ SC/IM: CPT

## 2023-11-16 PROCEDURE — 2550000001 HC RX 255 CONTRASTS: Performed by: STUDENT IN AN ORGANIZED HEALTH CARE EDUCATION/TRAINING PROGRAM

## 2023-11-16 PROCEDURE — 84145 PROCALCITONIN (PCT): CPT | Mod: GEALAB

## 2023-11-16 PROCEDURE — 85025 COMPLETE CBC W/AUTO DIFF WBC: CPT | Performed by: STUDENT IN AN ORGANIZED HEALTH CARE EDUCATION/TRAINING PROGRAM

## 2023-11-16 PROCEDURE — 99223 1ST HOSP IP/OBS HIGH 75: CPT

## 2023-11-16 PROCEDURE — 84075 ASSAY ALKALINE PHOSPHATASE: CPT | Performed by: STUDENT IN AN ORGANIZED HEALTH CARE EDUCATION/TRAINING PROGRAM

## 2023-11-16 PROCEDURE — 2500000001 HC RX 250 WO HCPCS SELF ADMINISTERED DRUGS (ALT 637 FOR MEDICARE OP)

## 2023-11-16 PROCEDURE — 96367 TX/PROPH/DG ADDL SEQ IV INF: CPT

## 2023-11-16 PROCEDURE — 83605 ASSAY OF LACTIC ACID: CPT | Performed by: STUDENT IN AN ORGANIZED HEALTH CARE EDUCATION/TRAINING PROGRAM

## 2023-11-16 PROCEDURE — 87449 NOS EACH ORGANISM AG IA: CPT | Mod: GEALAB

## 2023-11-16 PROCEDURE — 99285 EMERGENCY DEPT VISIT HI MDM: CPT | Mod: 25 | Performed by: STUDENT IN AN ORGANIZED HEALTH CARE EDUCATION/TRAINING PROGRAM

## 2023-11-16 PROCEDURE — 86140 C-REACTIVE PROTEIN: CPT

## 2023-11-16 PROCEDURE — 87636 SARSCOV2 & INF A&B AMP PRB: CPT | Performed by: STUDENT IN AN ORGANIZED HEALTH CARE EDUCATION/TRAINING PROGRAM

## 2023-11-16 PROCEDURE — 1100000001 HC PRIVATE ROOM DAILY

## 2023-11-16 PROCEDURE — 96366 THER/PROPH/DIAG IV INF ADDON: CPT

## 2023-11-16 RX ORDER — POTASSIUM CHLORIDE 20 MEQ/1
40 TABLET, EXTENDED RELEASE ORAL 2 TIMES DAILY
Status: DISCONTINUED | OUTPATIENT
Start: 2023-11-16 | End: 2023-11-17 | Stop reason: HOSPADM

## 2023-11-16 RX ORDER — AZITHROMYCIN 500 MG/1
500 TABLET, FILM COATED ORAL
Status: DISCONTINUED | OUTPATIENT
Start: 2023-11-16 | End: 2023-11-17 | Stop reason: HOSPADM

## 2023-11-16 RX ORDER — ANASTROZOLE 1 MG/1
1 TABLET ORAL DAILY
Status: DISCONTINUED | OUTPATIENT
Start: 2023-11-16 | End: 2023-11-17 | Stop reason: HOSPADM

## 2023-11-16 RX ORDER — ACETAMINOPHEN 325 MG/1
650 TABLET ORAL EVERY 4 HOURS PRN
Status: DISCONTINUED | OUTPATIENT
Start: 2023-11-16 | End: 2023-11-17 | Stop reason: HOSPADM

## 2023-11-16 RX ORDER — CEFTRIAXONE 1 G/50ML
1 INJECTION, SOLUTION INTRAVENOUS EVERY 24 HOURS
Status: DISCONTINUED | OUTPATIENT
Start: 2023-11-16 | End: 2023-11-17 | Stop reason: HOSPADM

## 2023-11-16 RX ORDER — DEXAMETHASONE SODIUM PHOSPHATE 10 MG/ML
6 INJECTION INTRAMUSCULAR; INTRAVENOUS ONCE
Status: COMPLETED | OUTPATIENT
Start: 2023-11-16 | End: 2023-11-16

## 2023-11-16 RX ORDER — POTASSIUM CHLORIDE 14.9 MG/ML
20 INJECTION INTRAVENOUS
Status: DISPENSED | OUTPATIENT
Start: 2023-11-16 | End: 2023-11-16

## 2023-11-16 RX ORDER — PENTOXIFYLLINE 400 MG/1
400 TABLET, EXTENDED RELEASE ORAL
Status: DISCONTINUED | OUTPATIENT
Start: 2023-11-16 | End: 2023-11-17 | Stop reason: HOSPADM

## 2023-11-16 RX ORDER — ACETAMINOPHEN 160 MG/5ML
650 SOLUTION ORAL EVERY 4 HOURS PRN
Status: DISCONTINUED | OUTPATIENT
Start: 2023-11-16 | End: 2023-11-17 | Stop reason: HOSPADM

## 2023-11-16 RX ORDER — DEXAMETHASONE 6 MG/1
6 TABLET ORAL DAILY
Status: DISCONTINUED | OUTPATIENT
Start: 2023-11-16 | End: 2023-11-17 | Stop reason: HOSPADM

## 2023-11-16 RX ORDER — POTASSIUM CHLORIDE 20 MEQ/1
40 TABLET, EXTENDED RELEASE ORAL ONCE
Status: COMPLETED | OUTPATIENT
Start: 2023-11-16 | End: 2023-11-16

## 2023-11-16 RX ORDER — VITAMIN E MIXED 400 UNIT
400 CAPSULE ORAL 2 TIMES DAILY
Status: DISCONTINUED | OUTPATIENT
Start: 2023-11-16 | End: 2023-11-17 | Stop reason: HOSPADM

## 2023-11-16 RX ORDER — POLYETHYLENE GLYCOL 3350 17 G/17G
17 POWDER, FOR SOLUTION ORAL DAILY
Status: DISCONTINUED | OUTPATIENT
Start: 2023-11-16 | End: 2023-11-17 | Stop reason: HOSPADM

## 2023-11-16 RX ORDER — ACETAMINOPHEN 325 MG/1
975 TABLET ORAL ONCE
Status: COMPLETED | OUTPATIENT
Start: 2023-11-16 | End: 2023-11-16

## 2023-11-16 RX ORDER — ENOXAPARIN SODIUM 100 MG/ML
40 INJECTION SUBCUTANEOUS EVERY 24 HOURS
Status: DISCONTINUED | OUTPATIENT
Start: 2023-11-16 | End: 2023-11-17 | Stop reason: HOSPADM

## 2023-11-16 RX ADMIN — CEFTRIAXONE SODIUM 1 G: 1 INJECTION, SOLUTION INTRAVENOUS at 22:37

## 2023-11-16 RX ADMIN — ACETAMINOPHEN 975 MG: 325 TABLET ORAL at 12:17

## 2023-11-16 RX ADMIN — ENOXAPARIN SODIUM 40 MG: 40 INJECTION SUBCUTANEOUS at 20:58

## 2023-11-16 RX ADMIN — AZITHROMYCIN 500 MG: 500 TABLET, FILM COATED ORAL at 20:57

## 2023-11-16 RX ADMIN — ANASTROZOLE 1 MG: 1 TABLET ORAL at 21:31

## 2023-11-16 RX ADMIN — DEXTROSE MONOHYDRATE 2000 MG: 50 INJECTION, SOLUTION INTRAVENOUS at 13:09

## 2023-11-16 RX ADMIN — DEXAMETHASONE SODIUM PHOSPHATE 6 MG: 10 INJECTION, SOLUTION INTRAMUSCULAR; INTRAVENOUS at 17:22

## 2023-11-16 RX ADMIN — POTASSIUM CHLORIDE 40 MEQ: 1500 TABLET, EXTENDED RELEASE ORAL at 17:20

## 2023-11-16 RX ADMIN — POLYETHYLENE GLYCOL 3350 17 G: 17 POWDER, FOR SOLUTION ORAL at 20:58

## 2023-11-16 RX ADMIN — REMDESIVIR 200 MG: 100 INJECTION, POWDER, LYOPHILIZED, FOR SOLUTION INTRAVENOUS at 20:59

## 2023-11-16 RX ADMIN — POTASSIUM CHLORIDE 20 MEQ: 14.9 INJECTION, SOLUTION INTRAVENOUS at 17:23

## 2023-11-16 RX ADMIN — PIPERACILLIN SODIUM AND TAZOBACTAM SODIUM 3.38 G: 3; .375 INJECTION, SOLUTION INTRAVENOUS at 12:17

## 2023-11-16 RX ADMIN — IOHEXOL 61 ML: 350 INJECTION, SOLUTION INTRAVENOUS at 17:04

## 2023-11-16 SDOH — SOCIAL STABILITY: SOCIAL INSECURITY: ABUSE: ADULT

## 2023-11-16 SDOH — SOCIAL STABILITY: SOCIAL INSECURITY: HAVE YOU HAD THOUGHTS OF HARMING ANYONE ELSE?: NO

## 2023-11-16 SDOH — SOCIAL STABILITY: SOCIAL INSECURITY: WERE YOU ABLE TO COMPLETE ALL THE BEHAVIORAL HEALTH SCREENINGS?: YES

## 2023-11-16 SDOH — SOCIAL STABILITY: SOCIAL INSECURITY: DOES ANYONE TRY TO KEEP YOU FROM HAVING/CONTACTING OTHER FRIENDS OR DOING THINGS OUTSIDE YOUR HOME?: NO

## 2023-11-16 SDOH — SOCIAL STABILITY: SOCIAL INSECURITY: ARE YOU OR HAVE YOU BEEN THREATENED OR ABUSED PHYSICALLY, EMOTIONALLY, OR SEXUALLY BY ANYONE?: NO

## 2023-11-16 SDOH — SOCIAL STABILITY: SOCIAL INSECURITY: HAS ANYONE EVER THREATENED TO HURT YOUR FAMILY OR YOUR PETS?: NO

## 2023-11-16 SDOH — SOCIAL STABILITY: SOCIAL INSECURITY: ARE THERE ANY APPARENT SIGNS OF INJURIES/BEHAVIORS THAT COULD BE RELATED TO ABUSE/NEGLECT?: NO

## 2023-11-16 SDOH — SOCIAL STABILITY: SOCIAL INSECURITY: DO YOU FEEL ANYONE HAS EXPLOITED OR TAKEN ADVANTAGE OF YOU FINANCIALLY OR OF YOUR PERSONAL PROPERTY?: NO

## 2023-11-16 SDOH — SOCIAL STABILITY: SOCIAL INSECURITY: DO YOU FEEL UNSAFE GOING BACK TO THE PLACE WHERE YOU ARE LIVING?: NO

## 2023-11-16 ASSESSMENT — COGNITIVE AND FUNCTIONAL STATUS - GENERAL
DAILY ACTIVITIY SCORE: 24
DAILY ACTIVITIY SCORE: 24
MOBILITY SCORE: 24
PATIENT BASELINE BEDBOUND: NO
MOBILITY SCORE: 24

## 2023-11-16 ASSESSMENT — ACTIVITIES OF DAILY LIVING (ADL)
TOILETING: INDEPENDENT
BATHING: INDEPENDENT
ADEQUATE_TO_COMPLETE_ADL: YES
LACK_OF_TRANSPORTATION: NO
FEEDING YOURSELF: INDEPENDENT
GROOMING: INDEPENDENT
HEARING - RIGHT EAR: FUNCTIONAL
DRESSING YOURSELF: INDEPENDENT
PATIENT'S MEMORY ADEQUATE TO SAFELY COMPLETE DAILY ACTIVITIES?: YES
HEARING - LEFT EAR: FUNCTIONAL
WALKS IN HOME: INDEPENDENT
JUDGMENT_ADEQUATE_SAFELY_COMPLETE_DAILY_ACTIVITIES: YES

## 2023-11-16 ASSESSMENT — ENCOUNTER SYMPTOMS
VOMITING: 0
FEVER: 1
ABDOMINAL PAIN: 0
NAUSEA: 0
PALPITATIONS: 0
SHORTNESS OF BREATH: 1
CHILLS: 1
DIARRHEA: 0

## 2023-11-16 ASSESSMENT — LIFESTYLE VARIABLES
AUDIT-C TOTAL SCORE: 0
EVER FELT BAD OR GUILTY ABOUT YOUR DRINKING: NO
HOW MANY STANDARD DRINKS CONTAINING ALCOHOL DO YOU HAVE ON A TYPICAL DAY: PATIENT DOES NOT DRINK
SUBSTANCE_ABUSE_PAST_12_MONTHS: NO
HAVE PEOPLE ANNOYED YOU BY CRITICIZING YOUR DRINKING: NO
SKIP TO QUESTIONS 9-10: 1
REASON UNABLE TO ASSESS: NO
HOW OFTEN DO YOU HAVE 6 OR MORE DRINKS ON ONE OCCASION: NEVER
HAVE YOU EVER FELT YOU SHOULD CUT DOWN ON YOUR DRINKING: NO
AUDIT-C TOTAL SCORE: 0
HOW OFTEN DO YOU HAVE A DRINK CONTAINING ALCOHOL: NEVER
PRESCIPTION_ABUSE_PAST_12_MONTHS: NO
EVER HAD A DRINK FIRST THING IN THE MORNING TO STEADY YOUR NERVES TO GET RID OF A HANGOVER: NO

## 2023-11-16 ASSESSMENT — COLUMBIA-SUICIDE SEVERITY RATING SCALE - C-SSRS
1. IN THE PAST MONTH, HAVE YOU WISHED YOU WERE DEAD OR WISHED YOU COULD GO TO SLEEP AND NOT WAKE UP?: NO
6. HAVE YOU EVER DONE ANYTHING, STARTED TO DO ANYTHING, OR PREPARED TO DO ANYTHING TO END YOUR LIFE?: NO
2. HAVE YOU ACTUALLY HAD ANY THOUGHTS OF KILLING YOURSELF?: NO

## 2023-11-16 ASSESSMENT — PATIENT HEALTH QUESTIONNAIRE - PHQ9
SUM OF ALL RESPONSES TO PHQ9 QUESTIONS 1 & 2: 0
1. LITTLE INTEREST OR PLEASURE IN DOING THINGS: NOT AT ALL
2. FEELING DOWN, DEPRESSED OR HOPELESS: NOT AT ALL

## 2023-11-16 ASSESSMENT — PAIN - FUNCTIONAL ASSESSMENT: PAIN_FUNCTIONAL_ASSESSMENT: 0-10

## 2023-11-16 ASSESSMENT — PAIN SCALES - GENERAL
PAINLEVEL_OUTOF10: 0 - NO PAIN
PAINLEVEL_OUTOF10: 0 - NO PAIN

## 2023-11-16 NOTE — ED PROVIDER NOTES
CC: Fever     HPI:  58-year-old female recent completed and radiation and chemotherapy for breast cancer presents to the emergency department with a fever after having her port removed yesterday.  Temperature max 103.  Was managing fever at home with acetaminophen.  Endorses deep cough.  Denies chest pain.  Denies new shortness of breath.  Congestion at baseline.  No new urinary symptoms.  Reports occasional burning when she urinates secondary to the chemotherapy.    Records Reviewed:  Recent available ED and inpatient notes reviewed in EMR.    PMHx/PSHx:  Per HPI.   - has a past medical history of Breast cancer metastasized to axillary lymph node, right (CMS/HCC), Changes in skin texture, Personal history of other diseases of the circulatory system, Personal history of other diseases of the circulatory system, and Personal history of other specified conditions.  - has a past surgical history that includes Hysteroscopy (07/02/2013); Dilation and curettage of uterus (07/02/2013); Tubal ligation (07/02/2013); Other surgical history (07/12/2013); Total abdominal hysterectomy (07/12/2013); and IR CVC port removal (11/15/2023).  - has Anemia; Breast cancer metastasized to axillary lymph node, right (CMS/HCC); Chest pain on exertion; Contact dermatitis due to poison ivy; Hot flashes due to menopause; Thyroid fullness; Palpitations; Lymphedema; Hypokalemia; and COVID-19 on their problem list.    Medications:  Reviewed in EMR. See EMR for complete list of medications and doses.    Allergies:  Patient has no known allergies.    Social History:  - Tobacco:  reports that she quit smoking about 28 years ago. Her smoking use included cigarettes. She smoked an average of 1 pack per day. She has never been exposed to tobacco smoke. She has never used smokeless tobacco.   - Alcohol:  reports that she does not currently use alcohol after a past usage of about 2.0 standard drinks of alcohol per week.   - Illicit Drugs:  reports no  history of drug use.     ROS:  Per HPI.       ???????????????????????????????????????????????????????????????  Triage Vitals:  T 39.9 °C (103.8 °F)  HR (!) 112  /83  RR 18  O2 95 % None (Room air)    Physical Exam  ???????????????????????????????????????????????????????????????  GEN: Uncomfortable appearing, no acute distress  HEAD: atraumatic  EYES: PERRL, EOMI, no scleral icterus  ENT: mmm   NECK: no JVD  CVS/CHEST: tachycardic  PULM: CTA b/l no wheezes, crackles, or rhonchi. erythema surrounding port site removal without exudates.  No abscess appreciated.  Chest wall tender to palpation without crepitus.  GI: soft, NT/ND, no rebound or guarding   EXT: no LE edema, 2+ periph pulses in bilat radial and DP   NEURO: Awake and alert, Strength and sensation is equal in b/l upper and lower extremities, normal ambulation, no focal sensory deficits  SKIN: warm, dry, no rashes or ulcerations  PSYCH: AAOx3 answers questions appropriately    Assessment and Plan:  58-year-old female presents emergency department for fever.  Finished chemotherapy back in October therefore unlikely to be neutropenic however is significantly febrile to 103.8.  Therefore started on vancomycin and Zosyn and blood cultures obtained to rule out bacteremia.  Patient also endorsing a deep cough.  Found to be COVID-positive.  She did become hypoxic in the emergency department requiring 4 L.  Given 6 mg of dexamethasone.  Given the new hypoxia CT angio obtained to assess for pulmonary embolism.  Patient admitted to medicine for COVID hypoxia.  Patient's primary oncologist, Dr. Chowdary updated.    ED Course:  Diagnoses as of 11/16/23 1522   COVID-19       Social Determinants Limiting Care:  None identified    Disposition:  admitted    Cristin Santos, DO      Procedures ? SmartLinks last updated 11/16/2023 3:22 PM        Cristin Santos,   11/16/23 1538

## 2023-11-16 NOTE — HOSPITAL COURSE
Yvrose John is a 58 y.o. female presenting with fever and SOB which began last night 11/15. PMHx includes right breast CA, rheumatic fever, and mitral valve prolapse. She had chemo port removed 11/15. She is admitted 11/16 and found to be COVID positive. Started on remdesivir and decadron. CT PE and D dimer negative for pulmonary embolism. She is stable for discharge on 11/17 with prescription for Paxlovid.

## 2023-11-16 NOTE — ED TRIAGE NOTES
Patient c/o a fever that started last night after she had her port removed. Patient was receiving chemo and radiation for left sided breast cancer, last tx was on 10/25/23. Patient states that she took tylenol last night and it helped for 4 hours.

## 2023-11-16 NOTE — ED NOTES
Patient placed on 4L NC due to 86% on room air, ed provider aware      Kate Ponce RN  11/16/23 0492

## 2023-11-16 NOTE — H&P
History Of Present Illness  Yvrose John is a 58 y.o. female presenting with fever and SOB which began last night. PMHx includes right breast CA, rheumatic fever, and mitral valve prolapse.     She states she has been receiving chemo and radiation for right beast CA, last treatment 10/25/23. She states she had her port removed yesterday, and began feeling feverish around dinner time yesterday. She had a fever of 102 which went down to 99 after she took 1000 mg Tylenol. She states she called her oncologist this morning, who recommended that she go to the ER.     She states she takes Miralax for occasional constipation. She denies CP, palpitations, SOB. She denies NVD.      Past Medical History  Past Medical History:   Diagnosis Date    Breast cancer metastasized to axillary lymph node, right (CMS/HCC) 08/16/2023    Changes in skin texture 05/31/2022    Breast skin changes    Personal history of other diseases of the circulatory system 09/05/2018    History of rheumatic fever    Personal history of other diseases of the circulatory system 09/05/2018    History of mitral valve prolapse    Personal history of other specified conditions 05/31/2022    History of lump of right breast       Surgical History  Past Surgical History:   Procedure Laterality Date    DILATION AND CURETTAGE OF UTERUS  07/02/2013    Dilation And Curettage    HYSTEROSCOPY  07/02/2013    Hysteroscopy With Endometrial Ablation    IR CVC PORT REMOVAL  11/15/2023    IR CVC PORT REMOVAL 11/15/2023 Shawn Shahid MD [facility]    OTHER SURGICAL HISTORY  07/12/2013    Laparoscopic Excision Left Fallopian Tube Cyst (___ Cm)    TOTAL ABDOMINAL HYSTERECTOMY  07/12/2013    Total Abdominal Hysterectomy    TUBAL LIGATION  07/02/2013    Tubal Ligation        Social History  She reports that she quit smoking about 28 years ago. Her smoking use included cigarettes. She smoked an average of 1 pack per day. She has never been exposed to tobacco smoke. She  has never used smokeless tobacco. She reports that she does not currently use alcohol after a past usage of about 2.0 standard drinks of alcohol per week. She reports that she does not use drugs.    Family History  Family History   Problem Relation Name Age of Onset    Hypertension Mother      Diabetes type II Father      Prostate cancer Father      Breast cancer Sister          Allergies  Patient has no known allergies.    Review of Systems   Constitutional:  Positive for chills and fever.   Respiratory:  Positive for shortness of breath.    Cardiovascular:  Negative for chest pain and palpitations.   Gastrointestinal:  Negative for abdominal pain, diarrhea, nausea and vomiting.   Allergic/Immunologic: Positive for immunocompromised state.        Physical Exam  Constitutional:       General: She is not in acute distress.     Appearance: Normal appearance. She is not ill-appearing, toxic-appearing or diaphoretic.   HENT:      Head: Normocephalic and atraumatic.      Nose: Nose normal. No congestion or rhinorrhea.   Eyes:      General: No scleral icterus.        Right eye: No discharge.         Left eye: No discharge.      Extraocular Movements: Extraocular movements intact.      Conjunctiva/sclera: Conjunctivae normal.      Pupils: Pupils are equal, round, and reactive to light.   Cardiovascular:      Rate and Rhythm: Normal rate and regular rhythm.      Heart sounds: Murmur heard.      No friction rub. No gallop.   Pulmonary:      Effort: Pulmonary effort is normal. No respiratory distress.      Breath sounds: Normal breath sounds. No stridor. No wheezing, rhonchi or rales.   Chest:      Chest wall: No tenderness.   Abdominal:      General: Abdomen is flat. There is no distension.      Palpations: Abdomen is soft. There is no mass.      Tenderness: There is no abdominal tenderness. There is no guarding or rebound.      Hernia: No hernia is present.   Musculoskeletal:         General: No swelling, deformity or signs  "of injury.      Cervical back: Normal range of motion.   Skin:     General: Skin is warm and dry.      Coloration: Skin is not jaundiced or pale.      Findings: No bruising, erythema, lesion or rash.   Neurological:      General: No focal deficit present.      Mental Status: She is alert and oriented to person, place, and time. Mental status is at baseline.   Psychiatric:         Mood and Affect: Mood normal.         Behavior: Behavior normal.         Thought Content: Thought content normal.         Judgment: Judgment normal.          Last Recorded Vitals  Blood pressure 154/64, pulse 103, temperature 39.9 °C (103.8 °F), temperature source Tympanic, resp. rate 20, height 1.6 m (5' 3\"), weight 86.1 kg (189 lb 13.1 oz), SpO2 93 %.    Relevant Results  Scheduled medications  dexAMETHasone, 6 mg, intravenous, Once  potassium chloride, 20 mEq, intravenous, q2h  potassium chloride CR, 40 mEq, oral, Once      Results for orders placed or performed during the hospital encounter of 11/16/23 (from the past 24 hour(s))   CBC and Auto Differential   Result Value Ref Range    WBC 7.3 4.4 - 11.3 x10*3/uL    nRBC 0.0 0.0 - 0.0 /100 WBCs    RBC 4.37 4.00 - 5.20 x10*6/uL    Hemoglobin 13.9 12.0 - 16.0 g/dL    Hematocrit 41.3 36.0 - 46.0 %    MCV 95 80 - 100 fL    MCH 31.8 26.0 - 34.0 pg    MCHC 33.7 32.0 - 36.0 g/dL    RDW 14.4 11.5 - 14.5 %    Platelets 174 150 - 450 x10*3/uL    Neutrophils % 81.0 40.0 - 80.0 %    Immature Granulocytes %, Automated 0.3 0.0 - 0.9 %    Lymphocytes % 4.2 13.0 - 44.0 %    Monocytes % 13.8 2.0 - 10.0 %    Eosinophils % 0.3 0.0 - 6.0 %    Basophils % 0.4 0.0 - 2.0 %    Neutrophils Absolute 5.94 1.20 - 7.70 x10*3/uL    Immature Granulocytes Absolute, Automated 0.02 0.00 - 0.70 x10*3/uL    Lymphocytes Absolute 0.31 (L) 1.20 - 4.80 x10*3/uL    Monocytes Absolute 1.01 (H) 0.10 - 1.00 x10*3/uL    Eosinophils Absolute 0.02 0.00 - 0.70 x10*3/uL    Basophils Absolute 0.03 0.00 - 0.10 x10*3/uL   Lactate   Result " Value Ref Range    Lactate 1.1 0.4 - 2.0 mmol/L   Influenza A, and B PCR   Result Value Ref Range    Flu A Result Not Detected Not Detected    Flu B Result Not Detected Not Detected   SARS-CoV-2 RT PCR   Result Value Ref Range    Coronavirus 2019, PCR Detected (A) Not Detected   Comprehensive Metabolic Panel   Result Value Ref Range    Glucose 122 (H) 74 - 99 mg/dL    Sodium 135 (L) 136 - 145 mmol/L    Potassium 2.6 (LL) 3.5 - 5.3 mmol/L    Chloride 101 98 - 107 mmol/L    Bicarbonate 24 21 - 32 mmol/L    Anion Gap 13 10 - 20 mmol/L    Urea Nitrogen 9 6 - 23 mg/dL    Creatinine 0.79 0.50 - 1.05 mg/dL    eGFR 88 >60 mL/min/1.73m*2    Calcium 9.2 8.6 - 10.3 mg/dL    Albumin 4.0 3.4 - 5.0 g/dL    Alkaline Phosphatase 74 33 - 110 U/L    Total Protein 6.6 6.4 - 8.2 g/dL    AST 39 9 - 39 U/L    Bilirubin, Total 1.0 0.0 - 1.2 mg/dL    ALT 21 7 - 45 U/L        Assessment/Plan   Yvrose John is a 58 y.o. female presenting with fever and SOB which began last night. PMHx includes right breast CA, rheumatic fever, and mitral valve prolapse. She had chemo port removed 11/15. She is COVID positive.     Acute medical issues  #Acute hypoxic respiratory failure on 4L O2 (baseline RA)  #COVID viremia:  #febrile illness  -remdesivir + decadron  -CT PE pending, D-dimer pending  -vanc + zosyn started in ED, pending blood cx   -CRP, ferritin pending    #hypokalemia  -ordered repletion    Chronic medical issues  #R breast CA: stable, monitor CBC/diff  #constipation: Miralax    F: oral  E: replete as needed  N: regular diet  G: None  Code status: full code  NOK: Tucker () [phone]    Dispo: Yvrose John is a 57 yo F admitted     Porter Musaitif, DO

## 2023-11-16 NOTE — PROGRESS NOTES
Pharmacy Medication History Review    Yvrose John is a 58 y.o. female admitted for No Principal Problem: There is no principal problem currently on the Problem List. Please update the Problem List and refresh.. Pharmacy reviewed the patient's [id number] medications and allergies for accuracy.    The list below reflectives the updated PTA list. Please review each medication in order reconciliation for additional clarification and justification.  (Not in a hospital admission)       The list below reflectives the updated allergy list. Please review each documented allergy for additional clarification and justification.  Allergies  Reviewed by Cristin Johns CPhT on 11/16/2023   No Known Allergies         Below are additional concerns with the patient's PTA list.      Cristin Johns CPhT

## 2023-11-17 ENCOUNTER — PHARMACY VISIT (OUTPATIENT)
Dept: PHARMACY | Facility: CLINIC | Age: 58
End: 2023-11-17
Payer: MEDICARE

## 2023-11-17 VITALS
TEMPERATURE: 97.7 F | BODY MASS INDEX: 33.63 KG/M2 | OXYGEN SATURATION: 93 % | SYSTOLIC BLOOD PRESSURE: 129 MMHG | HEIGHT: 63 IN | WEIGHT: 189.82 LBS | RESPIRATION RATE: 18 BRPM | DIASTOLIC BLOOD PRESSURE: 69 MMHG | HEART RATE: 80 BPM

## 2023-11-17 LAB
ALBUMIN SERPL BCP-MCNC: 4 G/DL (ref 3.4–5)
ANION GAP SERPL CALC-SCNC: 14 MMOL/L (ref 10–20)
APPEARANCE UR: CLEAR
BILIRUB UR STRIP.AUTO-MCNC: NEGATIVE MG/DL
BUN SERPL-MCNC: 9 MG/DL (ref 6–23)
CALCIUM SERPL-MCNC: 9.4 MG/DL (ref 8.6–10.3)
CHLORIDE SERPL-SCNC: 108 MMOL/L (ref 98–107)
CO2 SERPL-SCNC: 22 MMOL/L (ref 21–32)
COLOR UR: ABNORMAL
CREAT SERPL-MCNC: 0.61 MG/DL (ref 0.5–1.05)
ERYTHROCYTE [DISTWIDTH] IN BLOOD BY AUTOMATED COUNT: 14.1 % (ref 11.5–14.5)
GFR SERPL CREATININE-BSD FRML MDRD: >90 ML/MIN/1.73M*2
GLUCOSE SERPL-MCNC: 125 MG/DL (ref 74–99)
GLUCOSE UR STRIP.AUTO-MCNC: NEGATIVE MG/DL
HCT VFR BLD AUTO: 37.7 % (ref 36–46)
HGB BLD-MCNC: 12.3 G/DL (ref 12–16)
HOLD SPECIMEN: NORMAL
KETONES UR STRIP.AUTO-MCNC: ABNORMAL MG/DL
LEGIONELLA AG UR QL: NEGATIVE
LEUKOCYTE ESTERASE UR QL STRIP.AUTO: NEGATIVE
MAGNESIUM SERPL-MCNC: 2.03 MG/DL (ref 1.6–2.4)
MCH RBC QN AUTO: 31.9 PG (ref 26–34)
MCHC RBC AUTO-ENTMCNC: 32.6 G/DL (ref 32–36)
MCV RBC AUTO: 98 FL (ref 80–100)
NITRITE UR QL STRIP.AUTO: NEGATIVE
NRBC BLD-RTO: 0 /100 WBCS (ref 0–0)
PH UR STRIP.AUTO: 5 [PH]
PHOSPHATE SERPL-MCNC: 3.2 MG/DL (ref 2.5–4.9)
PLATELET # BLD AUTO: 131 X10*3/UL (ref 150–450)
POTASSIUM SERPL-SCNC: 3.5 MMOL/L (ref 3.5–5.3)
PROCALCITONIN SERPL-MCNC: 0.35 NG/ML
PROT UR STRIP.AUTO-MCNC: NEGATIVE MG/DL
RBC # BLD AUTO: 3.85 X10*6/UL (ref 4–5.2)
RBC # UR STRIP.AUTO: ABNORMAL /UL
RBC #/AREA URNS AUTO: NORMAL /HPF
S PNEUM AG UR QL: NEGATIVE
SODIUM SERPL-SCNC: 140 MMOL/L (ref 136–145)
SP GR UR STRIP.AUTO: 1.02
UROBILINOGEN UR STRIP.AUTO-MCNC: <2 MG/DL
WBC # BLD AUTO: 3.8 X10*3/UL (ref 4.4–11.3)
WBC #/AREA URNS AUTO: NORMAL /HPF

## 2023-11-17 PROCEDURE — 36415 COLL VENOUS BLD VENIPUNCTURE: CPT

## 2023-11-17 PROCEDURE — G0378 HOSPITAL OBSERVATION PER HR: HCPCS

## 2023-11-17 PROCEDURE — 2500000001 HC RX 250 WO HCPCS SELF ADMINISTERED DRUGS (ALT 637 FOR MEDICARE OP)

## 2023-11-17 PROCEDURE — 85027 COMPLETE CBC AUTOMATED: CPT

## 2023-11-17 PROCEDURE — 2500000004 HC RX 250 GENERAL PHARMACY W/ HCPCS (ALT 636 FOR OP/ED)

## 2023-11-17 PROCEDURE — 80069 RENAL FUNCTION PANEL: CPT

## 2023-11-17 PROCEDURE — RXMED WILLOW AMBULATORY MEDICATION CHARGE

## 2023-11-17 PROCEDURE — 83735 ASSAY OF MAGNESIUM: CPT

## 2023-11-17 PROCEDURE — 99239 HOSP IP/OBS DSCHRG MGMT >30: CPT

## 2023-11-17 PROCEDURE — 2500000005 HC RX 250 GENERAL PHARMACY W/O HCPCS: Performed by: STUDENT IN AN ORGANIZED HEALTH CARE EDUCATION/TRAINING PROGRAM

## 2023-11-17 RX ORDER — AMOXICILLIN AND CLAVULANATE POTASSIUM 875; 125 MG/1; MG/1
875 TABLET, FILM COATED ORAL 2 TIMES DAILY
Qty: 10 TABLET | Refills: 0 | Status: SHIPPED | OUTPATIENT
Start: 2023-11-17 | End: 2023-11-22

## 2023-11-17 RX ADMIN — AZITHROMYCIN 500 MG: 500 TABLET, FILM COATED ORAL at 09:22

## 2023-11-17 RX ADMIN — POLYETHYLENE GLYCOL 3350 17 G: 17 POWDER, FOR SOLUTION ORAL at 09:26

## 2023-11-17 RX ADMIN — Medication 2 L/MIN: at 08:41

## 2023-11-17 RX ADMIN — DEXAMETHASONE 6 MG: 6 TABLET ORAL at 09:22

## 2023-11-17 RX ADMIN — POTASSIUM CHLORIDE 40 MEQ: 1500 TABLET, EXTENDED RELEASE ORAL at 09:22

## 2023-11-17 RX ADMIN — ANASTROZOLE 1 MG: 1 TABLET ORAL at 09:21

## 2023-11-17 ASSESSMENT — COGNITIVE AND FUNCTIONAL STATUS - GENERAL
DAILY ACTIVITIY SCORE: 24
MOBILITY SCORE: 24

## 2023-11-17 ASSESSMENT — PAIN SCALES - GENERAL: PAINLEVEL_OUTOF10: 0 - NO PAIN

## 2023-11-17 ASSESSMENT — ACTIVITIES OF DAILY LIVING (ADL): LACK_OF_TRANSPORTATION: NO

## 2023-11-17 NOTE — DISCHARGE SUMMARY
Discharge Diagnosis  COVID-19    Issues Requiring Follow-Up  Follow up with your oncologist and your primary acre provider.     Discharge Meds     Your medication list        START taking these medications        Instructions Last Dose Given Next Dose Due   amoxicillin-pot clavulanate 875-125 mg tablet  Commonly known as: Augmentin      Take 1 tablet (875 mg) by mouth 2 times a day for 5 days.       nirmatrelvir-ritonavir 300 mg (150 mg x 2)-100 mg tablet therapy pack  Commonly known as: PAXLOVID      Take 3 tablets by mouth 2 times a day for 5 days. Follow the instructions on the package              CONTINUE taking these medications        Instructions Last Dose Given Next Dose Due   anastrozole 1 mg tablet  Commonly known as: Arimidex           pentoxifylline 400 mg ER tablet  Commonly known as: Trental      Take 1 tablet (400 mg) by mouth 3 times a day with meals. Do not crush, chew, or split.       potassium chloride CR 20 mEq ER tablet  Commonly known as: Klor-Con M20      Take 2 tablets (40 mEq) by mouth 2 times a day.       vitamin E 180 mg (400 unit) capsule      Take 1 capsule (400 Units) by mouth 2 times a day.                 Where to Get Your Medications        These medications were sent to [facility]  [address], [place] [address]      Hours: 9 AM to 5 PM Mon-Fri Phone: [phone]   amoxicillin-pot clavulanate 875-125 mg tablet  nirmatrelvir-ritonavir 300 mg (150 mg x 2)-100 mg tablet therapy pack         Test Results Pending At Discharge  Pending Labs       Order Current Status    Blood Culture Preliminary result    Blood Culture Preliminary result            Hospital Course  Yvrose John is a 58 y.o. female presenting with fever and SOB which began last night 11/15. PMHx includes right breast CA, rheumatic fever, and mitral valve prolapse. She had chemo port removed 11/15. She is admitted 11/16 and found to be COVID positive. Started on remdesivir and decadron. CT PE and  D dimer negative for pulmonary embolism. She is stable for discharge on 11/17 with prescription for Paxlovid.     Pertinent Physical Exam At Time of Discharge  Physical Exam  Constitutional:       General: She is not in acute distress.     Appearance: Normal appearance. She is obese. She is not ill-appearing, toxic-appearing or diaphoretic.   HENT:      Nose: Nose normal. No congestion or rhinorrhea.   Eyes:      General: No scleral icterus.        Right eye: No discharge.         Left eye: No discharge.      Extraocular Movements: Extraocular movements intact.      Conjunctiva/sclera: Conjunctivae normal.      Pupils: Pupils are equal, round, and reactive to light.   Cardiovascular:      Rate and Rhythm: Normal rate and regular rhythm.      Heart sounds: Normal heart sounds. No murmur heard.     No friction rub. No gallop.   Pulmonary:      Effort: Pulmonary effort is normal. No respiratory distress.      Breath sounds: Normal breath sounds. No stridor. No wheezing, rhonchi or rales.   Chest:      Chest wall: No tenderness.   Abdominal:      Palpations: Abdomen is soft.      Tenderness: There is no abdominal tenderness. There is no guarding or rebound.   Musculoskeletal:         General: No swelling or deformity.      Cervical back: Normal range of motion.      Right lower leg: No edema.      Left lower leg: No edema.      Comments: R breast is tense, enlarged, erythematous, and with Peau d'orange appearance   Skin:     General: Skin is warm and dry.      Coloration: Skin is not jaundiced.      Findings: No bruising.   Neurological:      General: No focal deficit present.      Mental Status: She is alert and oriented to person, place, and time. Mental status is at baseline.      Sensory: No sensory deficit.      Motor: No weakness.   Psychiatric:         Mood and Affect: Mood normal.         Behavior: Behavior normal.         Thought Content: Thought content normal.         Judgment: Judgment normal.          Outpatient Follow-Up  Future Appointments   Date Time Provider Department Center   11/20/2023  8:00 AM Sonia Radha, OT [facility]   11/22/2023  8:00 AM Sonia [place], OT [facility]   11/30/2023  7:30 AM Sonia [place], OT [facility]   12/4/2023  8:00 AM Sonia Radha, OT [facility]   12/7/2023  7:30 AM Sonia [place], OT [facility]   1/31/2024  9:00 AM Merlyn Rodriguez PA-C [id number] [facility]   5/17/2024  3:30 PM YASH Sandra-CNP [id number] [facility]   6/6/2024  [facility] 3 [facility] ELSIEU [facility]   6/7/2024 10:00 AM [facility]donado [id number]

## 2023-11-17 NOTE — PROGRESS NOTES
11/17/23 1343   Discharge Planning   Living Arrangements Spouse/significant other   Support Systems Spouse/significant other   Assistance Needed X3, independent in ADLs, ambulates without assistive devices, drives, no O2 baseline   Type of Residence Private residence   Number of Stairs to Enter Residence 3   Number of Stairs Within Residence 13  (has upstairs and downstairs)   Do you have animals or pets at home? Yes   Type of Animals or Pets 1 dog, 1 cat   Who is requesting discharge planning? Provider   Home or Post Acute Services None   Patient expects to be discharged to: Home with spouse, no needs-denied need for HHC, currently room air   Does the patient need discharge transport arranged? No   Financial Resource Strain   How hard is it for you to pay for the very basics like food, housing, medical care, and heating? Not very   Housing Stability   In the last 12 months, was there a time when you were not able to pay the mortgage or rent on time? N   In the last 12 months, how many places have you lived? 1   In the last 12 months, was there a time when you did not have a steady place to sleep or slept in a shelter (including now)? N   Transportation Needs   In the past 12 months, has lack of transportation kept you from medical appointments or from getting medications? no   In the past 12 months, has lack of transportation kept you from meetings, work, or from getting things needed for daily living? No

## 2023-11-17 NOTE — CARE PLAN
The patient's goals for the shift include be discharged.    The clinical goals for the shift include patient will have vital signs within normal limits    Over the shift, the patient did make progress toward the following goals. Barriers to progression include acuteness of illness. Recommendations to address these barriers include administration of prescribed treatments, purposeful hourly rounding.

## 2023-11-20 ENCOUNTER — APPOINTMENT (OUTPATIENT)
Dept: OCCUPATIONAL THERAPY | Facility: CLINIC | Age: 58
End: 2023-11-20
Payer: COMMERCIAL

## 2023-11-20 LAB
BACTERIA BLD CULT: NORMAL
BACTERIA BLD CULT: NORMAL

## 2023-11-20 NOTE — SIGNIFICANT EVENT
Follow Up Phone Call    Outgoing phone call    Spoke to: Yvrose John Relationship:self   Phone number: [phone]      Outcome: I left a message on answering machine   Chief Complaint   Patient presents with    Fever          Diagnosis:Not applicable

## 2023-11-22 ENCOUNTER — APPOINTMENT (OUTPATIENT)
Dept: OCCUPATIONAL THERAPY | Facility: CLINIC | Age: 58
End: 2023-11-22
Payer: COMMERCIAL

## 2023-11-27 ENCOUNTER — APPOINTMENT (OUTPATIENT)
Dept: OCCUPATIONAL THERAPY | Facility: CLINIC | Age: 58
End: 2023-11-27
Payer: COMMERCIAL

## 2023-11-30 ENCOUNTER — APPOINTMENT (OUTPATIENT)
Dept: OCCUPATIONAL THERAPY | Facility: CLINIC | Age: 58
End: 2023-11-30
Payer: COMMERCIAL

## 2023-12-04 ENCOUNTER — APPOINTMENT (OUTPATIENT)
Dept: OCCUPATIONAL THERAPY | Facility: CLINIC | Age: 58
End: 2023-12-04
Payer: COMMERCIAL

## 2023-12-07 ENCOUNTER — APPOINTMENT (OUTPATIENT)
Dept: OCCUPATIONAL THERAPY | Facility: CLINIC | Age: 58
End: 2023-12-07
Payer: COMMERCIAL

## 2023-12-11 ENCOUNTER — LAB (OUTPATIENT)
Dept: LAB | Facility: LAB | Age: 58
End: 2023-12-11
Payer: COMMERCIAL

## 2023-12-11 DIAGNOSIS — C77.3 BREAST CANCER METASTASIZED TO AXILLARY LYMPH NODE, RIGHT (MULTI): ICD-10-CM

## 2023-12-11 DIAGNOSIS — C50.911 BREAST CANCER METASTASIZED TO AXILLARY LYMPH NODE, RIGHT (MULTI): ICD-10-CM

## 2023-12-11 DIAGNOSIS — E87.6 HYPOKALEMIA: ICD-10-CM

## 2023-12-11 LAB
ALBUMIN SERPL BCP-MCNC: 4.2 G/DL (ref 3.4–5)
ALP SERPL-CCNC: 83 U/L (ref 33–110)
ALT SERPL W P-5'-P-CCNC: 27 U/L (ref 7–45)
ANION GAP SERPL CALC-SCNC: 12 MMOL/L (ref 10–20)
AST SERPL W P-5'-P-CCNC: 41 U/L (ref 9–39)
BASOPHILS # BLD AUTO: 0.04 X10*3/UL (ref 0–0.1)
BASOPHILS NFR BLD AUTO: 0.8 %
BILIRUB SERPL-MCNC: 1.2 MG/DL (ref 0–1.2)
BUN SERPL-MCNC: 8 MG/DL (ref 6–23)
CALCIUM SERPL-MCNC: 9.6 MG/DL (ref 8.6–10.3)
CHLORIDE SERPL-SCNC: 106 MMOL/L (ref 98–107)
CO2 SERPL-SCNC: 27 MMOL/L (ref 21–32)
CREAT SERPL-MCNC: 0.63 MG/DL (ref 0.5–1.05)
EOSINOPHIL # BLD AUTO: 0.22 X10*3/UL (ref 0–0.7)
EOSINOPHIL NFR BLD AUTO: 4.2 %
ERYTHROCYTE [DISTWIDTH] IN BLOOD BY AUTOMATED COUNT: 14.6 % (ref 11.5–14.5)
GFR SERPL CREATININE-BSD FRML MDRD: >90 ML/MIN/1.73M*2
GLUCOSE SERPL-MCNC: 84 MG/DL (ref 74–99)
HCT VFR BLD AUTO: 42.5 % (ref 36–46)
HGB BLD-MCNC: 13.8 G/DL (ref 12–16)
IMM GRANULOCYTES # BLD AUTO: 0.01 X10*3/UL (ref 0–0.7)
IMM GRANULOCYTES NFR BLD AUTO: 0.2 % (ref 0–0.9)
LYMPHOCYTES # BLD AUTO: 0.91 X10*3/UL (ref 1.2–4.8)
LYMPHOCYTES NFR BLD AUTO: 17.5 %
MCH RBC QN AUTO: 32.3 PG (ref 26–34)
MCHC RBC AUTO-ENTMCNC: 32.5 G/DL (ref 32–36)
MCV RBC AUTO: 100 FL (ref 80–100)
MONOCYTES # BLD AUTO: 0.52 X10*3/UL (ref 0.1–1)
MONOCYTES NFR BLD AUTO: 10 %
NEUTROPHILS # BLD AUTO: 3.51 X10*3/UL (ref 1.2–7.7)
NEUTROPHILS NFR BLD AUTO: 67.3 %
NRBC BLD-RTO: 0 /100 WBCS (ref 0–0)
PLATELET # BLD AUTO: 182 X10*3/UL (ref 150–450)
POTASSIUM SERPL-SCNC: 3.2 MMOL/L (ref 3.5–5.3)
PROT SERPL-MCNC: 6.4 G/DL (ref 6.4–8.2)
RBC # BLD AUTO: 4.27 X10*6/UL (ref 4–5.2)
SODIUM SERPL-SCNC: 142 MMOL/L (ref 136–145)
WBC # BLD AUTO: 5.2 X10*3/UL (ref 4.4–11.3)

## 2023-12-11 PROCEDURE — 85025 COMPLETE CBC W/AUTO DIFF WBC: CPT

## 2023-12-11 PROCEDURE — 36415 COLL VENOUS BLD VENIPUNCTURE: CPT

## 2023-12-11 PROCEDURE — 80053 COMPREHEN METABOLIC PANEL: CPT

## 2023-12-12 ENCOUNTER — TELEPHONE (OUTPATIENT)
Dept: HEMATOLOGY/ONCOLOGY | Facility: CLINIC | Age: 58
End: 2023-12-12
Payer: COMMERCIAL

## 2023-12-12 DIAGNOSIS — E87.6 HYPOKALEMIA: ICD-10-CM

## 2023-12-12 DIAGNOSIS — E87.6 HYPOKALEMIA: Primary | ICD-10-CM

## 2023-12-12 RX ORDER — POTASSIUM CHLORIDE 20 MEQ/1
20 TABLET, EXTENDED RELEASE ORAL DAILY
Qty: 60 TABLET | Refills: 0 | Status: SHIPPED | OUTPATIENT
Start: 2023-12-12 | End: 2024-01-31 | Stop reason: SDUPTHER

## 2023-12-12 NOTE — TELEPHONE ENCOUNTER
----- Message from Danny Chowdary MD sent at 12/12/2023  1:09 PM EST -----  Will do- thank you.    Let's have her do a CMP in early January. I think she sees Merlyn late January, but that's probably a bit too long to go without checking.    ----- Message -----  From: Claudia Thompson RN  Sent: 12/12/2023   1:04 PM EST  To: Danny Chowdary MD    Actually, I don't seen a current RX for the potassium if you wouldn't mind putting one in, thanks  ----- Message -----  From: Danny Chowdary MD  Sent: 12/12/2023  11:39 AM EST  To: Claudia Thompson RN    Hi Yamileth- can you call Yvrose and let her know potassium is better, but still a bit low. So I'd like her to continue potassium chloride 20 mEq per day for now. She might need a refill, which I can send if needed. Thank you.    ----- Message -----  From: Lab, Background User  Sent: 12/11/2023   8:34 PM EST  To: Danny Chowdary MD

## 2023-12-12 NOTE — TELEPHONE ENCOUNTER
----- Message from Danny Chowdary MD sent at 12/12/2023 11:38 AM EST -----  Hi Yamileth- can you call Yvrose and let her know potassium is better, but still a bit low. So I'd like her to continue potassium chloride 20 mEq per day for now. She might need a refill, which I can send if needed. Thank you.    ----- Message -----  From: Lab, Background User  Sent: 12/11/2023   8:34 PM EST  To: Danny Chowdary MD

## 2023-12-12 NOTE — TELEPHONE ENCOUNTER
At the request of Dr. Chowdary, patient was called and made aware that her potassium from 12/11/23 was 3.2 and he would like for her to keep taking the potassium 20mEq daily. Pt states that she is in need of a refill of this RX - message sent to Dr. Chowdary for refill and was asked when he would like to have her get her next labs drawn. Patient verbalized understanding and agreement regarding discussed information via verbal feedback.

## 2023-12-12 NOTE — TELEPHONE ENCOUNTER
Per Dr. Chowdary's request, pt is to come in early January to have a repeat CBC drawn and will continue to follow up with Merlyn Rodriguez PA-C on 1/31/23. Adam order has been entered and patient has been called and a message was left for her on her identifiable VM. Epi Luz has also sent in her RX for the potassium.

## 2024-01-30 NOTE — PROGRESS NOTES
DIVISION OF MEDICAL ONCOLOGY    Patient ID: Yvrose John is a 58 y.o. female.  MRN: [id number]  : 1965  The patient presents to clinic today for her history of right breast cancer.     Cancer Staging   Breast cancer metastasized to axillary lymph node, right (CMS/HCC)  Staging form: Breast, AJCC 8th Edition  - Pathologic stage from 2022: No Stage Recommended - Signed by Danny Chowdary MD on 10/28/2023  pT1c  pN2a  cM0  Andrea grade: G2  ER Status: Positive  CT Status: Positive  HER2 Status: Positive    Diagnostic/Therapeutic History:  - Noted pain, rash right breast.  - 2022: Skin biopsy showed eosinophilic spongiotic dermatitis. Mammogram and ultrasound showed a 1.4 cm right breast mass with multiple abnormal right axillary LN's.  - 6/3/22: US-guided biopsy of the right breast mass showed IDC, grade 2-3, ER >95% CT 40%, Her2+ (3+ IHC). An axillary LN was positive for metastatic carcinoma.  - 22: PET/CT did not showed any evidence of distant metastatic disease, but did show hypermetabolic activity in the 2.5 cm right breast mass and ALN's (cT2N1).  - Initiated on TCHP 22. She developed papilledema that was attributed to the carboplatin, so this was removed from cycle 2-6. Completed cycle #6 of THP on 10/17/22.  - HP initiated while awaiting surgery.  - Right lumpectomy/ALND performed on 22. 1.4 cm of residual grade 2 breast tumor noted with 4 LN's positive for carcinoma (2 macrometastases; 2 micrometastases). No GALI noted, but tumor deposits present in perinodal soft tissue. +LVI. Margins negative.  ypT1c ypN2a.  - Initial plan was for immediate reconstruction, but ultimately decided to purse adjuvant RT.  - Switched to adjuvant TDM1 23.  - Adjuvant RT 23-3/16/23.    History of Present Illness (HPI)/Interval History:  Yvrose John presents today for routine FUV. She completed TDM1 in Oct 2023. She has remained fatigued but able to do ADLs.   Was working with PT  "for lymphedema management, However, insurance stopped covering a good portion of the clinic so lymphedema has again worsened in the right breast with pain/warmth/swelling. She is continuing to work with our financial department and her insurance to get the resources needed to address this, denies any fever.    Doing well with anastrozole. Hot flashes very mild. Some hoint arthralgias but tolerable.  Previously-noted cough has improved but persisted. Is a dry cough \"deep down/honking\". Denies SOB, fevers, chest pain.   She did complete her months worth of potassium.   No diarrhea, dyspnea, edema, abdominal pain.     Review of Systems:  14-point ROS otherwise negative, as per HPI/Interval History.    Past Medical History:   Diagnosis Date    Breast cancer metastasized to axillary lymph node, right (CMS/HCC) 08/16/2023    Changes in skin texture 05/31/2022    Breast skin changes    Personal history of other diseases of the circulatory system 09/05/2018    History of rheumatic fever    Personal history of other diseases of the circulatory system 09/05/2018    History of mitral valve prolapse    Personal history of other specified conditions 05/31/2022    History of lump of right breast     Patient Active Problem List   Diagnosis    Anemia    Breast cancer metastasized to axillary lymph node, right (CMS/HCC)    Chest pain on exertion    Contact dermatitis due to poison ivy    Hot flashes due to menopause    Thyroid fullness    Palpitations    Lymphedema    Hypokalemia    COVID-19        Past Surgical History:   Procedure Laterality Date    DILATION AND CURETTAGE OF UTERUS  07/02/2013    Dilation And Curettage    HYSTEROSCOPY  07/02/2013    Hysteroscopy With Endometrial Ablation    IR CVC PORT REMOVAL  11/15/2023    IR CVC PORT REMOVAL 11/15/2023 Shawn Shahid MD AD CVEPINV    IR CVC PORT REMOVAL  11/15/2023    IR CVC PORT REMOVAL    OTHER SURGICAL HISTORY  07/12/2013    Laparoscopic Excision Left Fallopian Tube " Cyst (___ Cm)    TOTAL ABDOMINAL HYSTERECTOMY  2013    Total Abdominal Hysterectomy    TUBAL LIGATION  2013    Tubal Ligation       Social History     Socioeconomic History    Marital status:      Spouse name: Not on file    Number of children: Not on file    Years of education: Not on file    Highest education level: Not on file   Occupational History    Not on file   Tobacco Use    Smoking status: Former     Packs/day: 1     Types: Cigarettes     Quit date:      Years since quittin.0     Passive exposure: Never    Smokeless tobacco: Never   Vaping Use    Vaping Use: Never used   Substance and Sexual Activity    Alcohol use: Not Currently     Alcohol/week: 2.0 standard drinks of alcohol     Types: 2 Glasses of wine per week     Comment: social    Drug use: Never    Sexual activity: Not on file   Other Topics Concern    Not on file   Social History Narrative    Not on file     Social Determinants of Health     Financial Resource Strain: Low Risk  (2023)    Overall Financial Resource Strain (CARDIA)     Difficulty of Paying Living Expenses: Not very hard   Food Insecurity: Not on file   Transportation Needs: No Transportation Needs (2023)    PRAPARE - Transportation     Lack of Transportation (Medical): No     Lack of Transportation (Non-Medical): No   Physical Activity: Not on file   Stress: Not on file   Social Connections: Not on file   Intimate Partner Violence: Not on file   Housing Stability: Low Risk  (2023)    Housing Stability Vital Sign     Unable to Pay for Housing in the Last Year: No     Number of Places Lived in the Last Year: 1     Unstable Housing in the Last Year: No       Family History   Problem Relation Name Age of Onset    Hypertension Mother      Diabetes type II Father      Prostate cancer Father      Breast cancer Sister         Allergies:   No Known Allergies      Current Outpatient Medications:     anastrozole (Arimidex) 1 mg tablet, Take 1  tablet (1 mg total) by mouth once daily., Disp: , Rfl:     pentoxifylline (Trental) 400 mg ER tablet, Take 1 tablet (400 mg) by mouth 3 times a day with meals. Do not crush, chew, or split., Disp: 90 tablet, Rfl: 5    potassium chloride CR (Klor-Con M20) 20 mEq ER tablet, Take 1 tablet (20 mEq) by mouth once daily. Do not crush or chew., Disp: 60 tablet, Rfl: 0    vitamin E 180 mg (400 unit) capsule, Take 1 capsule (400 Units) by mouth 2 times a day., Disp: 60 capsule, Rfl: 5     Objective    BSA: There is no height or weight on file to calculate BSA.  There were no vitals taken for this visit.    ECOG Performance Status:  The ECOG performance scale today is ECO- Restricted in physically strenuous activity.  Carries out light duty.    Physical Exam  Vitals reviewed.   Constitutional:       General: She is awake. She is not in acute distress.     Appearance: Normal appearance. She is not ill-appearing.   HENT:      Head: Normocephalic and atraumatic.      Mouth/Throat:      Pharynx: Oropharynx is clear. No oropharyngeal exudate.   Eyes:      General: No scleral icterus.     Conjunctiva/sclera: Conjunctivae normal.   Neck:      Trachea: Trachea and phonation normal. No tracheal tenderness.   Cardiovascular:      Rate and Rhythm: Normal rate and regular rhythm.      Heart sounds: No murmur heard.     No friction rub. No gallop.   Pulmonary:      Effort: Pulmonary effort is normal. No respiratory distress.      Breath sounds: Normal breath sounds. No stridor. No wheezing, rhonchi or rales.   Chest:      Chest wall: No mass, lacerations, deformity or tenderness.   Breasts:     Right: Swelling, skin change and tenderness present. No mass or nipple discharge.      Left: No swelling, mass, nipple discharge, skin change or tenderness.      Comments: S/p right lumpectomy with induration, warmth, erythema, tenderness 2/2 lymphedema   Abdominal:      General: Abdomen is flat. There is no distension.      Palpations: Abdomen  is soft. There is no mass.      Tenderness: There is no abdominal tenderness.   Musculoskeletal:         General: No tenderness or deformity. Normal range of motion.      Cervical back: Normal range of motion and neck supple. No rigidity.      Right lower leg: No edema.      Left lower leg: No edema.   Lymphadenopathy:      Cervical: No cervical adenopathy.      Upper Body:      Right upper body: No supraclavicular, axillary or pectoral adenopathy.      Left upper body: No supraclavicular, axillary or pectoral adenopathy.   Skin:     General: Skin is warm and dry.      Coloration: Skin is not jaundiced.      Findings: No lesion or rash.   Neurological:      General: No focal deficit present.      Mental Status: She is alert and oriented to person, place, and time.      Motor: No weakness.      Gait: Gait normal.   Psychiatric:         Mood and Affect: Mood normal.         Behavior: Behavior normal.         Thought Content: Thought content normal.         Judgment: Judgment normal.       Laboratory Data:  Lab Results   Component Value Date    WBC 5.2 12/11/2023    HGB 13.8 12/11/2023    HCT 42.5 12/11/2023     12/11/2023     12/11/2023       Chemistry    Lab Results   Component Value Date/Time     12/11/2023 0918    K 3.2 (L) 12/11/2023 0918     12/11/2023 0918    CO2 27 12/11/2023 0918    BUN 8 12/11/2023 0918    CREATININE 0.63 12/11/2023 0918    Lab Results   Component Value Date/Time    CALCIUM 9.6 12/11/2023 0918    ALKPHOS 83 12/11/2023 0918    AST 41 (H) 12/11/2023 0918    ALT 27 12/11/2023 0918    BILITOT 1.2 12/11/2023 0918           Radiology:  ECHO (10/4/23):  CONCLUSIONS:   1. Left ventricular systolic function is normal with a 65% estimated ejection fraction.   2. Spectral Doppler shows an impaired relaxation pattern of left ventricular diastolic filling.   3. Strain values are normal, which imply normal myocardial function.    Pathology: N/A      Assessment/Plan:  Yvrose SIDHU  Dora is a 58 y.o. female with a history of right ER+/Her2+ breast cancer, who presents today for follow-up evaluation.    Breast cancer metastasized to axillary lymph node, right (CMS/HCC)  - Continue anastrozole 1 mg daily.  - Grade 1 hot flashes, stable, will monitor.  - ECHO with normal/stable LVEF 10/4/23. No further ECHO's required.  - Mammogram scheduled 6/2024 with Dr. Maldonado, the right side may be a challenge to get imaged due to lymphedema, hopefully this will improve with recommendation detailed below   - Rad onc follow up 5/2024   - DEXA 7/2023 normal.    There is no evidence of breast cancer recurrence based on her clinical exam today.      Lymphedema of the right breast   - had been improving with lymphedema clinic however has gotten worse since stopping due to lack of coverage. Now grade 2-3.   - Grade 1 fatigue: has been ongoing for some time, will continue to monitor stable for now. Doing more exercise   - Discussed reaching out to insurance to see if [facility] would be covered with insurance   - Also recommended home exercises and good compression bras, hopefully she will have pneumatic pump coverage as that would be profoundly beneficial to her.   - No fevers or focal evidence of cellulitis but educated on signs and symptoms of this     Hypokalemia  - Likely secondary to TDM1, despite absence of diarrhea.  - Continue Kcl 20 mEq BID. Repeat CMP in 1 month      Disposition.  - RTC End of July / early aug 2024   - She has our contact information and was instructed to call with concerns/questions in the interim.    No orders of the defined types were placed in this encounter.       Merlyn Rodriguez PA-C  Hematology and Medical Oncology  [facility]

## 2024-01-31 ENCOUNTER — OFFICE VISIT (OUTPATIENT)
Dept: HEMATOLOGY/ONCOLOGY | Facility: CLINIC | Age: 59
End: 2024-01-31
Payer: COMMERCIAL

## 2024-01-31 VITALS
HEART RATE: 85 BPM | SYSTOLIC BLOOD PRESSURE: 147 MMHG | DIASTOLIC BLOOD PRESSURE: 100 MMHG | TEMPERATURE: 96.6 F | RESPIRATION RATE: 18 BRPM | BODY MASS INDEX: 33.92 KG/M2 | OXYGEN SATURATION: 97 % | WEIGHT: 191.47 LBS

## 2024-01-31 DIAGNOSIS — C77.3 BREAST CANCER METASTASIZED TO AXILLARY LYMPH NODE, RIGHT (MULTI): ICD-10-CM

## 2024-01-31 DIAGNOSIS — R53.83 OTHER FATIGUE: ICD-10-CM

## 2024-01-31 DIAGNOSIS — E87.6 HYPOKALEMIA: Primary | ICD-10-CM

## 2024-01-31 DIAGNOSIS — C50.911 BREAST CANCER METASTASIZED TO AXILLARY LYMPH NODE, RIGHT (MULTI): ICD-10-CM

## 2024-01-31 LAB
ALBUMIN SERPL BCP-MCNC: 4.3 G/DL (ref 3.4–5)
ALP SERPL-CCNC: 98 U/L (ref 33–110)
ALT SERPL W P-5'-P-CCNC: 22 U/L (ref 7–45)
ANION GAP SERPL CALC-SCNC: 13 MMOL/L (ref 10–20)
AST SERPL W P-5'-P-CCNC: 32 U/L (ref 9–39)
BASOPHILS # BLD AUTO: 0.05 X10*3/UL (ref 0–0.1)
BASOPHILS NFR BLD AUTO: 0.8 %
BILIRUB SERPL-MCNC: 0.9 MG/DL (ref 0–1.2)
BUN SERPL-MCNC: 9 MG/DL (ref 6–23)
CALCIUM SERPL-MCNC: 9.8 MG/DL (ref 8.6–10.3)
CHLORIDE SERPL-SCNC: 105 MMOL/L (ref 98–107)
CO2 SERPL-SCNC: 26 MMOL/L (ref 21–32)
CREAT SERPL-MCNC: 0.55 MG/DL (ref 0.5–1.05)
EGFRCR SERPLBLD CKD-EPI 2021: >90 ML/MIN/1.73M*2
EOSINOPHIL # BLD AUTO: 0.27 X10*3/UL (ref 0–0.7)
EOSINOPHIL NFR BLD AUTO: 4.4 %
ERYTHROCYTE [DISTWIDTH] IN BLOOD BY AUTOMATED COUNT: 13.4 % (ref 11.5–14.5)
GLUCOSE SERPL-MCNC: 97 MG/DL (ref 74–99)
HCT VFR BLD AUTO: 41.9 % (ref 36–46)
HGB BLD-MCNC: 14.2 G/DL (ref 12–16)
IMM GRANULOCYTES # BLD AUTO: 0.02 X10*3/UL (ref 0–0.7)
IMM GRANULOCYTES NFR BLD AUTO: 0.3 % (ref 0–0.9)
LYMPHOCYTES # BLD AUTO: 0.95 X10*3/UL (ref 1.2–4.8)
LYMPHOCYTES NFR BLD AUTO: 15.5 %
MCH RBC QN AUTO: 31.6 PG (ref 26–34)
MCHC RBC AUTO-ENTMCNC: 33.9 G/DL (ref 32–36)
MCV RBC AUTO: 93 FL (ref 80–100)
MONOCYTES # BLD AUTO: 0.44 X10*3/UL (ref 0.1–1)
MONOCYTES NFR BLD AUTO: 7.2 %
NEUTROPHILS # BLD AUTO: 4.39 X10*3/UL (ref 1.2–7.7)
NEUTROPHILS NFR BLD AUTO: 71.8 %
NRBC BLD-RTO: 0 /100 WBCS (ref 0–0)
PLATELET # BLD AUTO: 182 X10*3/UL (ref 150–450)
POTASSIUM SERPL-SCNC: 3.3 MMOL/L (ref 3.5–5.3)
PROT SERPL-MCNC: 6.9 G/DL (ref 6.4–8.2)
RBC # BLD AUTO: 4.5 X10*6/UL (ref 4–5.2)
SODIUM SERPL-SCNC: 141 MMOL/L (ref 136–145)
WBC # BLD AUTO: 6.1 X10*3/UL (ref 4.4–11.3)

## 2024-01-31 PROCEDURE — 80053 COMPREHEN METABOLIC PANEL: CPT

## 2024-01-31 PROCEDURE — 85025 COMPLETE CBC W/AUTO DIFF WBC: CPT

## 2024-01-31 PROCEDURE — 99215 OFFICE O/P EST HI 40 MIN: CPT

## 2024-01-31 PROCEDURE — 1036F TOBACCO NON-USER: CPT

## 2024-01-31 RX ORDER — ANASTROZOLE 1 MG/1
1 TABLET ORAL DAILY
Qty: 90 TABLET | Refills: 3 | Status: SHIPPED | OUTPATIENT
Start: 2024-01-31

## 2024-01-31 RX ORDER — POTASSIUM CHLORIDE 20 MEQ/1
20 TABLET, EXTENDED RELEASE ORAL 2 TIMES DAILY
Qty: 60 TABLET | Refills: 0 | Status: SHIPPED | OUTPATIENT
Start: 2024-01-31

## 2024-01-31 RX ORDER — POTASSIUM CHLORIDE 20 MEQ/1
20 TABLET, EXTENDED RELEASE ORAL DAILY
Qty: 60 TABLET | Refills: 0 | Status: SHIPPED | OUTPATIENT
Start: 2024-01-31 | End: 2024-01-31 | Stop reason: SDUPTHER

## 2024-01-31 ASSESSMENT — PAIN SCALES - GENERAL: PAINLEVEL: 2

## 2024-01-31 ASSESSMENT — ENCOUNTER SYMPTOMS
OCCASIONAL FEELINGS OF UNSTEADINESS: 0
DEPRESSION: 0
LOSS OF SENSATION IN FEET: 0

## 2024-05-09 DIAGNOSIS — C50.911 BREAST CANCER METASTASIZED TO AXILLARY LYMPH NODE, RIGHT (MULTI): ICD-10-CM

## 2024-05-09 DIAGNOSIS — C77.3 BREAST CANCER METASTASIZED TO AXILLARY LYMPH NODE, RIGHT (MULTI): ICD-10-CM

## 2024-05-09 DIAGNOSIS — I89.0 LYMPHEDEMA: ICD-10-CM

## 2024-05-09 RX ORDER — VITAMIN E MIXED 400 UNIT
400 CAPSULE ORAL 2 TIMES DAILY
Qty: 60 CAPSULE | Refills: 5 | Status: SHIPPED | OUTPATIENT
Start: 2024-05-09

## 2024-05-17 ENCOUNTER — HOSPITAL ENCOUNTER (OUTPATIENT)
Dept: RADIATION ONCOLOGY | Facility: CLINIC | Age: 59
Setting detail: RADIATION/ONCOLOGY SERIES
Discharge: HOME | End: 2024-05-17
Payer: COMMERCIAL

## 2024-05-17 VITALS
HEART RATE: 75 BPM | BODY MASS INDEX: 34.76 KG/M2 | WEIGHT: 196.21 LBS | TEMPERATURE: 97.5 F | SYSTOLIC BLOOD PRESSURE: 156 MMHG | RESPIRATION RATE: 18 BRPM | OXYGEN SATURATION: 96 % | DIASTOLIC BLOOD PRESSURE: 102 MMHG

## 2024-05-17 DIAGNOSIS — I89.0 LYMPHEDEMA: Primary | ICD-10-CM

## 2024-05-17 DIAGNOSIS — C77.3 BREAST CANCER METASTASIZED TO AXILLARY LYMPH NODE, RIGHT (MULTI): ICD-10-CM

## 2024-05-17 DIAGNOSIS — C50.911 BREAST CANCER METASTASIZED TO AXILLARY LYMPH NODE, RIGHT (MULTI): ICD-10-CM

## 2024-05-17 PROCEDURE — 99214 OFFICE O/P EST MOD 30 MIN: CPT | Performed by: NURSE PRACTITIONER

## 2024-05-17 RX ORDER — DICLOXACILLIN SODIUM 500 MG/1
500 CAPSULE ORAL 4 TIMES DAILY
Qty: 40 CAPSULE | Refills: 0 | Status: SHIPPED | OUTPATIENT
Start: 2024-05-17 | End: 2024-05-27

## 2024-05-17 ASSESSMENT — PAIN SCALES - GENERAL: PAINLEVEL: 2

## 2024-05-17 NOTE — PROGRESS NOTES
Radiation Oncology Follow-Up    Patient Name:  Yvrose John  MRN:  [id number]  :  1965    Referring Provider: Estela Osman, APR*  Primary Care Provider: Garfield Pal DO  Care Team: Patient Care Team:  Garfield Pal DO as PCP - General  Garfield Pal DO as Primary Care Provider  Danny Chowdary MD as Consulting Physician (Hematology and Oncology)    Date of Service: 2024     Cancer Staging:          Breast         AJCC Edition: 8th (AJCC), Diagnosis Date: 2022, Stage(no match), ypT1c ypN2a cM0  G2     Treatment Synopsis:    59-year-old female with clinical T2 N1 M0 invasive ductal carcinoma of the right breast status post neoadjuvant TCHP x1 followed by THP x5 followed by a right partial  mastectomy with sentinel lymph node biopsy     Treatment Summary:     - Pt initially appreciated a mass in her right breast in early May.  There was also a triangular-shaped rash around her right nipple.       - On 2022 she underwent a mammogram which revealed an irregular mass in the superior right breast with pleomorphic calcifications as well as an oval mass in the inferior right breast which was slightly more prominent.  She underwent a right breast  ultrasound the same day directed at the 12 o'clock position, 11 cm from the nipple which revealed a 1.2 x 1.4 x 1.2 cm hypoechoic mass.  At the 7 o'clock position, 11 cm from the nipple was a benign-appearing intramammary lymph node.  Axillary ultrasound  revealed multiple abnormal lymph nodes the largest measuring 2.3 x 1.8 x 3 cm.       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  Right breast core biopsy performed 6/3/2022 revealed invasive ductal carcinoma, grade 2-3.  Estrogen receptors stained positive at greater than  95%.  Progesterone receptors stained positive at 40%.  HER2/johnna was 3+ IHC.  Lymph node biopsy was consistent with metastatic carcinoma.        - She underwent a PET scan on 7/5/2022 which revealed a 2.5 x 1 cm right breast mass with 2 hypermetabolic right axillary lymph nodes.  She saw Dr. Mullins who recommended neoadjuvant chemotherapy.  She received 1 cycle of TCHP which was complicated by  papilledema and the carboplatinum was discontinued.  She then received 5 additional cycles of THP.  Her chemotherapy was delivered between 7/1/2022 and 10/17/2022.       - CT scan of the chest abdomen and pelvis following chemotherapy on 11/11/2022 which revealed a nonspecific right lower lobe lung nodule as well as likely cysts in the liver.  There was resolution of the right axillary lymphadenopathy.  She had desired  mastectomies with reconstruction however had difficulty meeting with the plastic surgeon and decided to proceed with breast conservation so as to not delay her care.       - On 11/30/2022 she underwent a right partial mastectomy with sentinel lymph node biopsy and completion axillary lymph node dissection.  Pathology revealed a 1.4 cm invasive ductal carcinoma, grade 2.  No angiolymphatic invasion was present.  Ductal carcinoma  in situ of the solid and cribriform subtypes, nuclear grade 2 was present.  There was no element of extensive intraductal component.  The resection margins were negative.  Ductal carcinoma in situ came within 1 mm of the medial margin of resection.  4  sentinel lymph nodes were removed, all 3 of which contained metastatic disease with scattered deposits of tumor in the perinodal soft tissue.  An additional 14 axillary lymph nodes were removed, 1 of which contained metastatic disease.  Thus, the total  was 4 of 18 positive lymph nodes.  Original plan was to undergo a completion mastectomy with a risk reducing contralateral mastectomy and bilateral reconstruction on 1/25/2023.      - She saw Dr. Chowdary who did not want to delay her adjuvant chemotherapy any longer and felt that radiation also probably should not be delayed.        - 2/6/23 - 3/16/23: Radiation therapy to the right breast, axilla, SCV and regional lymph nodes consisting of a dose of 50 Gy with additional 10 Gy to the tumor bed for a total of 60 Gy.     - Adjuvant TDM1 initiated 1/24/23    - Adjuvant endocrine therapy with tamoxifen; switched to anastrozole    SUBJECTIVE  History of Present Illness:   Yvrose John is  here today for routine radiation follow up/surveillance visit.  She is feeling well overall.  She continues working full time as manager. Since her last visit, she has developed lymphedema of right breast with hardening of tissue/fibrosis.  She has been going to PT and was getting a lymphedema pump however insurance would not cover any services and she stopped going and returned the pump. She took PTX and Vit E for 3 months but stopped due to nausea. She has some developing upper arm swelling and decrease in ROM.  She is currently taking anastrozole. No significant side effects other that hot flashes. No headaches, fever, chills, cough, SOB, chest pain, GI  or  complaints and no bony pain.    Review of Systems:    Review of Systems   All other systems reviewed and are negative.    Performance Status:   The Karnofsky performance scale today is 90, Able to carry on normal activity; minor signs or symptoms of disease (ECOG equivalent 0).      OBJECTIVE  Vital Signs:  There were no vitals taken for this visit.     Current Outpatient Medications:     anastrozole (Arimidex) 1 mg tablet, Take 1 tablet (1 mg total) by mouth once daily., Disp: 90 tablet, Rfl: 3    pentoxifylline (Trental) 400 mg ER tablet, Take 1 tablet (400 mg) by mouth 3 times a day with meals. Do not crush, chew, or split., Disp: 90 tablet, Rfl: 5    potassium chloride CR (Klor-Con M20) 20 mEq ER tablet, Take 1 tablet (20 mEq) by mouth 2 times a day. Do not crush or chew., Disp: 60 tablet, Rfl: 0    vitamin E 180 mg (400 unit) capsule, TAKE 1 CAPSULE (400 UNITS) BY MOUTH 2 TIMES A DAY.,  Disp: 60 capsule, Rfl: 5     Physical Exam  Constitutional:       Appearance: Normal appearance.   HENT:      Head: Normocephalic and atraumatic.      Nose: Nose normal.      Mouth/Throat:      Mouth: Mucous membranes are moist.      Pharynx: Oropharynx is clear.   Eyes:      Conjunctiva/sclera: Conjunctivae normal.      Pupils: Pupils are equal, round, and reactive to light.   Cardiovascular:      Rate and Rhythm: Normal rate.      Heart sounds: Murmur heard.      No gallop.   Pulmonary:      Effort: Pulmonary effort is normal.      Breath sounds: Normal breath sounds.   Chest:   Breasts:     Right: Skin change present. No inverted nipple (Nipple edematous), mass or nipple discharge.      Left: Normal. No swelling, inverted nipple, mass, nipple discharge or tenderness.      Comments: Right breast with well healed surgical incision.  Breast lymphedema with significant fibrosis, mildly erythematous and firm peau d'orange texture. Superior breast with small papular type lesions and erythema suggestive of possible cellulitis.   Abdominal:      Palpations: Abdomen is soft.   Musculoskeletal:         General: No swelling. Normal range of motion.      Cervical back: Normal range of motion.   Lymphadenopathy:      Cervical: No cervical adenopathy.      Upper Body:      Right upper body: No supraclavicular or axillary adenopathy.      Left upper body: No supraclavicular or axillary adenopathy.   Skin:     General: Skin is warm and dry.   Neurological:      General: No focal deficit present.      Mental Status: She is alert and oriented to person, place, and time.   Psychiatric:         Mood and Affect: Mood normal.         Behavior: Behavior normal.         RESULTS:     Narrative & Impression   Interpreted By:  RANDALL GAMEZ MD  MRN: [id number]  Patient Name: ARUN PAIZ     STUDY:  DIGITAL DIAG MAMM BILAT WITH JOSE;  6/2/2023 11:09 am     ACCESSION NUMBER(S):  [id number]     ORDERING CLINICIAN:  GISELE CANO      INDICATION:  Right lumpectomy, chemotherapy, and radiation.     COMPARISON:  11/30/2022, 05/24/2022, 06/10/2013, and priors dating back to 2012     FINDINGS:  2D and tomosynthesis images were reviewed at 1 mm slice thickness.     There are areas of scattered fibroglandular tissue. Interval  postsurgical parenchymal scarring and surgical clips are seen in the  superolateral right breast at middle to posterior depth. Diffuse  marked right breast skin and trabecular thickening is present, likely  representing post radiation/postoperative changes.   No suspicious  masses or calcifications are identified in either breast.     IMPRESSION:  No mammographic evidence of malignancy.  Interval right breast  posttreatment changes. Marked right breast skin and trabecular  thickening, likely post radiation/postoperative changes, for which  clinical correlation is recommended.     BI-RADS CATEGORY:     Category: 2 - Benign.  Recommendation: Annual mammogram   Narrative  Name: ARUN PAIZ    Patient ID: [id number] YOB: 1965 Height: 63.0 in.      Gender:     Female   Exam Date:  07/05/2023 Weight: 195.0 lbs.    Indications: breast ca, CAFFEINE, CHOLECYSTECTOMY, Hysterectomy    Fractures:   finger                                                Treatments:  ANASTROZOLE                                            LEFT FEMUR - TOTAL  The bone mineral density : 1.072 g/cm2  T-score : 0.5    % of young normal mean :106 %  Z-score : 0.7    % of age matched mean : 109 %    % change vs. Previous : -     % change vs. Baseline : baseline    *Indicates significant change based on 95% confidence interval.    LEFT FEMUR - NECK  The bone mineral density : 0.932 g/cm2  T-score : -0.8    % of young normal mean: 90 %  Z-score : -0.2    % of age matched mean : 97 %    % change vs. Previous : -     % change vs. Baseline : baseline    *Indicates significant change based on 95% confidence interval.    SPINE L1-L3  The bone mineral  density is 1.229 g/cm2  T-score : 0.4   % of young normal mean is 104 %  Z-score : 0.5    % of age matched mean is 105 %    % change vs. Previous : -     % change vs. Baseline : baseline    *Indicates significant change based on 95% confidence interval.     World Health Organization (WHO) criteria for post-menopausal,   Women:     Normal:       T-score at or above -1 SD         Osteopenia:   T-score between -1 and -2.5 SD     Osteoporosis: T-score at or below -2.5 SD          10-Year Fracture Risk:  Major Osteoporotic Fracture -      Hip Fracture                -      Reported Risk Factors       None      Interpretation:  According to World Health Organization criteria, classification is  normal.     ASSESSMENT:  59 y.o. female with triple positive right sided breast cancer s/p neoadjuvant chemotherapy followed by breast conserving surgery followed by radiation therap. R breast lymphedema, fibrosis considerably worse.  Discussed and examined with Dr. Padilla. Will order STAT MRI of right breast and RX for dicloxacillin sent to her pharmacy. She has follow up with Dr. Maldonado for mammogram in June .  EPIC Chat sent to Dr. Maldonado and Dr. Chowdary as well. Radiation follow up in 6 mo. Call with any questions or concerns or if breast is not improving or worsens.           Moraima Osman CNP  [phone]

## 2024-05-17 NOTE — ADDENDUM NOTE
Encounter addended by: YASH Sandra-ALLA on: 5/17/2024 4:54 PM   Actions taken: Visit diagnoses modified

## 2024-05-17 NOTE — ADDENDUM NOTE
Encounter addended by: Estela Osman, YASH-ALLA on: 5/17/2024 5:00 PM   Actions taken: Order list changed, Diagnosis association updated

## 2024-05-18 ENCOUNTER — HOSPITAL ENCOUNTER (OUTPATIENT)
Dept: RADIOLOGY | Facility: HOSPITAL | Age: 59
Discharge: HOME | End: 2024-05-18
Payer: COMMERCIAL

## 2024-05-18 DIAGNOSIS — I89.0 LYMPHEDEMA: ICD-10-CM

## 2024-05-18 DIAGNOSIS — C77.3 BREAST CANCER METASTASIZED TO AXILLARY LYMPH NODE, RIGHT (MULTI): ICD-10-CM

## 2024-05-18 DIAGNOSIS — C50.911 BREAST CANCER METASTASIZED TO AXILLARY LYMPH NODE, RIGHT (MULTI): ICD-10-CM

## 2024-05-18 PROCEDURE — 2550000001 HC RX 255 CONTRASTS: Performed by: NURSE PRACTITIONER

## 2024-05-18 PROCEDURE — 77049 MRI BREAST C-+ W/CAD BI: CPT | Performed by: STUDENT IN AN ORGANIZED HEALTH CARE EDUCATION/TRAINING PROGRAM

## 2024-05-18 PROCEDURE — 77049 MRI BREAST C-+ W/CAD BI: CPT

## 2024-05-18 PROCEDURE — A9575 INJ GADOTERATE MEGLUMI 0.1ML: HCPCS | Performed by: NURSE PRACTITIONER

## 2024-05-18 PROCEDURE — C8908 MRI W/O FOL W/CONT, BREAST,: HCPCS

## 2024-05-18 RX ORDER — GADOTERATE MEGLUMINE 376.9 MG/ML
20 INJECTION INTRAVENOUS
Status: COMPLETED | OUTPATIENT
Start: 2024-05-18 | End: 2024-05-18

## 2024-05-18 RX ADMIN — GADOTERATE MEGLUMINE 18 ML: 376.9 INJECTION INTRAVENOUS at 15:13

## 2024-05-28 ENCOUNTER — SOCIAL WORK (OUTPATIENT)
Dept: HEMATOLOGY/ONCOLOGY | Facility: CLINIC | Age: 59
End: 2024-05-28
Payer: COMMERCIAL

## 2024-05-30 NOTE — PROGRESS NOTES
GIOVANNA received a referral from [facility] [place] for radiation oncology. Patient with recent follow up appointment with noted lymphedema. Patient has been hesitate to restart treatment as she had difficulty with the insurance coverage when she had treatment in 2023. GIOVANNA reached out to patient on the phone. SW introduced self and explained role within the multidisciplinary team at [facility]. Patient explained the issues with the bills for the lymphedema treatments and pump for a DME company. Pump was returned and not billed to patient as insurance denied coverage. Patient was billed for out of network services for the lymphedema treatments. GIOVANNA requested patient email any/all documents from the previous treatment plan. SW offered to investigate. GIOVANNA reached out tot he therapist, O/P therapy billing department and insurance. The claims were processed as out of network which was in error. Patient's insurance should have been processing claims for the O/P therapy as in-network. Per  this was corrected. Patient refunded the out of pocket expenses. GIOVANNA advised patient via email and encouraged her to re-consider initiating therapy for the lymphedema. Patient agreeable and appreciative. CNP updated. GIOVANNA available as needed.

## 2024-06-04 NOTE — PROGRESS NOTES
BREAST SURGICAL ONCOLOGY FOLLOW UP     Phillip John is a 59 y.o. female presents today for follow up carcinoma of the right breast.   She is doing well and has no complaints or concerns.  She has had progressive fibrosis of the right breast since September 2023.    Treatment history:  Right breast cancer diagnosed in June 2022.  Invasive ductal carcinoma, grade 2-3; ER greater than 95%, [place] 40%, HER2 positive.  Clinical stage II A (cT1c N1)  She received neoadjuvant chemotherapy (Dr. Mullins/Dr. Chowdary) with Taxotere, Herceptin and Perjita. Carboplatin was stopped after 1 round because of side effects.      On November 30, 2022, she had a right lumpectomy, right axillary sentinel lymph node biopsy and right axillary lymph node dissection.  The pathology showed a residual tumor of 1.4 cm, clear margins. 3/4 sentinel lymph nodes were positive and 1/14 lymph nodes were positive from the axillary lymph node dissection. Total lymph nodes 4/18. ypT1c N2a     She had radiation therapy from February 6, 2023 through March 16, 2023.     She is currently on tamoxifen and tolerating that without problems.      Mammogram: Bilateral screening mammogram yesterday shows skin thickening in the right breast.  No evidence of malignancy.  Breast MRI on May 18, 2024 shows postoperative scarring in the breast with diffuse skin thickening consistent with radiation changes.  No findings concerning for malignancy.    Radiology review: All images and reports were personally reviewed and the findings discussed with the patient.     Review of Systems   Constitutional symptoms: Denies generalized fatigue.  Denies weight change, fevers/chills, difficulty sleeping   Eyes: Denies double vision, glaucoma, cataracts.  Ear/nose/throat/mouth: Denies hearing changes, sore throat, sinus problems.  Cardiovascular: No chest pain.  Denies irregular heartbeat.  Denies ankle swelling.  Respiratory: No wheezing, cough, or shortness of  breath.  Gastrointestinal: No abdominal pain,  No nausea/vomiting.  No indigestion/heartburn.  No change in bowel habits.  No constipation or diarrhea.   Genitourinary: No urinary incontinence.  No urinary frequency.  No painful urination.  Musculoskeletal: No bone pain, no muscle pain, no joint pain.   Integumentary: No rash. No masses.  No changes in moles.  No easy bruising.  Neurological: No headaches.  No tremors. No numbness/tingling.  Psychiatric: No anxiety. No depression.  Endocrine: No excessive thirst.  Not too hot or too cold.  Not tired or fatigued.    Hematological/lymphatic: No swollen glands or blood clotting problems.  No bruising.    PHYSICAL EXAM:    General: Alert and oriented x 3.  Mood and affect are appropriate.  Neck: supple, no masses, no cervical adenopathy.  Cardiovascular: no lower extremity edema.  Pulmonary: breathing non labored on room air.  GI: Abdomen soft, no masses. No hepatomegaly or splenomegaly.  Lymph nodes: No supraclavicular or axillary adenopathy bilaterally.  Musculoskeletal: Full range of motion in the upper extremities bilaterally.  Neuro: denies dizziness, tremors  Physical Exam  Chest:      Comments: There is no cervical, supraclavicular, or axillary lymphadenopathy.  The breasts are symmetric bilaterally. In the left breast, there are no dominant masses, no skin changes, and no nipple discharge. In the right breast, there are no palpable masses and no nipple discharge.  The breast skin is thickened and firm with dense fibrosis.  The skin is taut.          Assessment/Plan     Right breast cancer diagnosed in June 2022.  Invasive ductal carcinoma, grade 2-3; ER greater than 95%, [place] 40%, HER2 positive.  Clinical stage II A (cT1c N1)  -ypT1c N2a following neoadjuvant chemotherapy    Clinical breast exam and mammogram today are normal.  There is no evidence of cancer recurrence.    Continue follow up with medical oncology as scheduled.  Continue anastrozole.    Will follow  up with me if there are any breast concerns.     Regi Maldonado MD

## 2024-06-06 ENCOUNTER — HOSPITAL ENCOUNTER (OUTPATIENT)
Dept: RADIOLOGY | Facility: CLINIC | Age: 59
Discharge: HOME | End: 2024-06-06
Payer: COMMERCIAL

## 2024-06-06 VITALS — WEIGHT: 196.21 LBS | HEIGHT: 63 IN | BODY MASS INDEX: 34.77 KG/M2

## 2024-06-06 DIAGNOSIS — C77.3 SECONDARY AND UNSPECIFIED MALIGNANT NEOPLASM OF AXILLA AND UPPER LIMB LYMPH NODES (MULTI): ICD-10-CM

## 2024-06-06 DIAGNOSIS — C50.911 MALIGNANT NEOPLASM OF UNSPECIFIED SITE OF RIGHT FEMALE BREAST (MULTI): ICD-10-CM

## 2024-06-06 PROCEDURE — 77062 BREAST TOMOSYNTHESIS BI: CPT | Performed by: RADIOLOGY

## 2024-06-06 PROCEDURE — 77062 BREAST TOMOSYNTHESIS BI: CPT

## 2024-06-06 PROCEDURE — 77066 DX MAMMO INCL CAD BI: CPT | Performed by: RADIOLOGY

## 2024-06-07 ENCOUNTER — OFFICE VISIT (OUTPATIENT)
Dept: SURGICAL ONCOLOGY | Facility: CLINIC | Age: 59
End: 2024-06-07
Payer: COMMERCIAL

## 2024-06-07 VITALS
DIASTOLIC BLOOD PRESSURE: 92 MMHG | BODY MASS INDEX: 34.3 KG/M2 | HEART RATE: 67 BPM | TEMPERATURE: 98.3 F | WEIGHT: 193.6 LBS | SYSTOLIC BLOOD PRESSURE: 145 MMHG | HEIGHT: 63 IN

## 2024-06-07 DIAGNOSIS — C77.3 BREAST CANCER METASTASIZED TO AXILLARY LYMPH NODE, RIGHT (MULTI): Primary | ICD-10-CM

## 2024-06-07 DIAGNOSIS — C50.911 BREAST CANCER METASTASIZED TO AXILLARY LYMPH NODE, RIGHT (MULTI): Primary | ICD-10-CM

## 2024-06-07 PROCEDURE — 99213 OFFICE O/P EST LOW 20 MIN: CPT | Performed by: SURGERY

## 2024-06-07 ASSESSMENT — PAIN SCALES - GENERAL: PAINLEVEL: 1

## 2024-06-19 ENCOUNTER — OFFICE VISIT (OUTPATIENT)
Dept: HEMATOLOGY/ONCOLOGY | Facility: CLINIC | Age: 59
End: 2024-06-19
Payer: COMMERCIAL

## 2024-06-19 VITALS
WEIGHT: 196.43 LBS | SYSTOLIC BLOOD PRESSURE: 155 MMHG | DIASTOLIC BLOOD PRESSURE: 80 MMHG | RESPIRATION RATE: 18 BRPM | BODY MASS INDEX: 34.8 KG/M2 | TEMPERATURE: 98.4 F | HEART RATE: 76 BPM | OXYGEN SATURATION: 98 %

## 2024-06-19 DIAGNOSIS — I89.0 LYMPHEDEMA: ICD-10-CM

## 2024-06-19 DIAGNOSIS — Z17.0 ER+ (ESTROGEN RECEPTOR POSITIVE STATUS): ICD-10-CM

## 2024-06-19 DIAGNOSIS — Z79.811 USE OF ANASTROZOLE (ARIMIDEX): ICD-10-CM

## 2024-06-19 DIAGNOSIS — C50.911 BREAST CANCER METASTASIZED TO AXILLARY LYMPH NODE, RIGHT (MULTI): Primary | ICD-10-CM

## 2024-06-19 DIAGNOSIS — C77.3 BREAST CANCER METASTASIZED TO AXILLARY LYMPH NODE, RIGHT (MULTI): Primary | ICD-10-CM

## 2024-06-19 PROCEDURE — 99214 OFFICE O/P EST MOD 30 MIN: CPT | Performed by: INTERNAL MEDICINE

## 2024-06-19 ASSESSMENT — PAIN SCALES - GENERAL: PAINLEVEL: 0-NO PAIN

## 2024-06-19 NOTE — PROGRESS NOTES
Patient here alone for follow up with Dr. Chowdary. Patient states that she has some joint pain in her knees and occasional hot flashes. Yvrose denies mood swings. She reports to have lympedema that has stayed the same since last visit.     Dr. Chowdary discussed Zometa with patient. Print out given to patient to review.     Per MD patient to follow up in 6 months with Merlyn Rodriguez PA  -PT referral for lymphedema sent    Patient verbalized understanding, no further questions at this time.

## 2024-06-22 NOTE — PROGRESS NOTES
DIVISION OF MEDICAL ONCOLOGY    Patient ID: Yvrose John is a 59 y.o. female.  MRN: [id number]  : 1965  The patient presents to clinic today for her history of right breast cancer.     Cancer Staging   Breast cancer metastasized to axillary lymph node, right (Multi)  Staging form: Breast, AJCC 8th Edition  - Pathologic stage from 2022: No Stage Recommended - Signed by Danny Chowdary MD on 10/28/2023  pT1c  pN2a  cM0  [place] grade: G2  ER Status: Positive  VT Status: Positive  HER2 Status: Positive    Diagnostic/Therapeutic History:  - Noted pain, rash right breast.  - 2022: Skin biopsy showed eosinophilic spongiotic dermatitis. Mammogram and ultrasound showed a 1.4 cm right breast mass with multiple abnormal right axillary LN's.  - 6/3/22: US-guided biopsy of the right breast mass showed IDC, grade 2-3, ER >95% VT 40%, Her2+ (3+ IHC). An axillary LN was positive for metastatic carcinoma.  - 22: PET/CT did not showed any evidence of distant metastatic disease, but did show hypermetabolic activity in the 2.5 cm right breast mass and ALN's (cT2N1).  - Initiated on TCHP 22. She developed papilledema that was attributed to the carboplatin, so this was removed from cycle 2-6. Completed cycle #6 of THP on 10/17/22.  - HP initiated while awaiting surgery.  - Right lumpectomy/ALND performed on 22. 1.4 cm of residual grade 2 breast tumor noted with 4 LN's positive for carcinoma (2 macrometastases; 2 micrometastases). No GALI noted, but tumor deposits present in perinodal soft tissue. +LVI. Margins negative.  ypT1c ypN2a.  - Initial plan was for immediate reconstruction, but ultimately decided to purse adjuvant RT.  - Switched to adjuvant TDM1 23. Completed 14 cycles 10/2023.  - Adjuvant RT 23-3/16/23.  - 3/2023: Tamoxifen initiated, switched to anastrozole after a few months once postmenopausal status was confirmed.    History of Present Illness (HPI)/Interval History:  Yvrose  NAHUM John presents today for routine FUV on anastrozole.  Overall, she is doing well since her last visit.  She had been established with PT for her right breast lymphedema, but due to insurance issues, she was unable to continue therapy further.  She has ongoing symptoms in the right breast- feels hard/heavy, tender at times.  No lymphedema in her arm.  She notes some hot flashes and joint pains, but these are manageable.    Review of Systems:  14-point ROS otherwise negative, as per HPI/Interval History.    Past Medical History:   Diagnosis Date    Breast cancer metastasized to axillary lymph node, right (Multi) 08/16/2023    Changes in skin texture 05/31/2022    Breast skin changes    Hx antineoplastic chemo     Personal history of irradiation     Personal history of other diseases of the circulatory system 09/05/2018    History of rheumatic fever    Personal history of other diseases of the circulatory system 09/05/2018    History of mitral valve prolapse    Personal history of other specified conditions 05/31/2022    History of lump of right breast     Patient Active Problem List   Diagnosis    Anemia    Breast cancer metastasized to axillary lymph node, right (Multi)    Chest pain on exertion    Contact dermatitis due to poison ivy    Hot flashes due to menopause    Thyroid fullness    Palpitations    Lymphedema    Hypokalemia    COVID-19        Past Surgical History:   Procedure Laterality Date    BREAST LUMPECTOMY Right 11/30/2022    right lumpectomy w/ chemo and rad    DILATION AND CURETTAGE OF UTERUS  07/02/2013    Dilation And Curettage    HYSTEROSCOPY  07/02/2013    Hysteroscopy With Endometrial Ablation    IR CVC PORT REMOVAL  11/15/2023    IR CVC PORT REMOVAL 11/15/2023 Shawn Shahid MD AD CVEPIN[facility]    IR CVC PORT REMOVAL  11/15/2023    IR CVC PORT REMOVAL    OTHER SURGICAL HISTORY  07/12/2013    Laparoscopic Excision Left Fallopian Tube Cyst (___ Cm)    TOTAL ABDOMINAL HYSTERECTOMY  07/12/2013     Total Abdominal Hysterectomy    TUBAL LIGATION  2013    Tubal Ligation       Social History     Socioeconomic History    Marital status:      Spouse name: Not on file    Number of children: Not on file    Years of education: Not on file    Highest education level: Not on file   Occupational History    Not on file   Tobacco Use    Smoking status: Former     Current packs/day: 0.00     Types: Cigarettes     Quit date:      Years since quittin.4     Passive exposure: Never    Smokeless tobacco: Never   Vaping Use    Vaping status: Never Used   Substance and Sexual Activity    Alcohol use: Not Currently     Alcohol/week: 2.0 standard drinks of alcohol     Types: 2 Glasses of wine per week     Comment: social    Drug use: Never    Sexual activity: Not on file   Other Topics Concern    Not on file   Social History Narrative    Not on file     Social Determinants of Health     Financial Resource Strain: Low Risk  (2023)    Overall Financial Resource Strain (CARDIA)     Difficulty of Paying Living Expenses: Not very hard   Food Insecurity: No Food Insecurity (2023)    Received from [facility]    Hunger Vital Sign     Worried About Running Out of Food in the Last Year: Never true     Ran Out of Food in the Last Year: Never true   Transportation Needs: No Transportation Needs (2023)    PRAPARE - Transportation     Lack of Transportation (Medical): No     Lack of Transportation (Non-Medical): No   Physical Activity: Sufficiently Active (2023)    Received from [facility]    Exercise Vital Sign     Days of Exercise per Week: 7 days     Minutes of Exercise per Session: 30 min   Stress: No Stress Concern Present (2023)    Received from [facility]    [employer] - Occupational Stress Questionnaire     Feeling of Stress : Only a little   Social Connections: Moderately Isolated (2023)    Received from [facility]    Social  Connection and Isolation Panel [NHANES]     Frequency of Communication with Friends and Family: Three times a week     Frequency of Social Gatherings with Friends and Family: Once a week     Attends [employer] Services: Never     Active Member of Clubs or Organizations: No     Attends Club or Organization Meetings: Never     Marital Status:    Intimate Partner Violence: Not At Risk (6/13/2023)    Received from [facility]    Humiliation, Afraid, Rape, and Kick questionnaire     Fear of Current or Ex-Partner: No     Emotionally Abused: No     Physically Abused: No     Sexually Abused: No   Housing Stability: Low Risk  (11/17/2023)    Housing Stability Vital Sign     Unable to Pay for Housing in the Last Year: No     Number of Places Lived in the Last Year: 1     Unstable Housing in the Last Year: No       Family History   Problem Relation Name Age of Onset    Hypertension Mother      Diabetes type II Father      Prostate cancer Father      Breast cancer Sister         Allergies:   No Known Allergies      Current Outpatient Medications:     anastrozole (Arimidex) 1 mg tablet, Take 1 tablet (1 mg total) by mouth once daily., Disp: 90 tablet, Rfl: 3    pentoxifylline (Trental) 400 mg ER tablet, Take 1 tablet (400 mg) by mouth 3 times a day with meals. Do not crush, chew, or split. (Patient not taking: Reported on 6/7/2024), Disp: 90 tablet, Rfl: 5    potassium chloride CR (Klor-Con M20) 20 mEq ER tablet, Take 1 tablet (20 mEq) by mouth 2 times a day. Do not crush or chew. (Patient not taking: Reported on 6/7/2024), Disp: 60 tablet, Rfl: 0    vitamin E 180 mg (400 unit) capsule, TAKE 1 CAPSULE (400 UNITS) BY MOUTH 2 TIMES A DAY. (Patient not taking: Reported on 6/7/2024), Disp: 60 capsule, Rfl: 5     Objective    BSA: 1.99 meters squared  /80 (BP Location: Left arm, Patient Position: Sitting, BP Cuff Size: Adult long)   Pulse 76   Temp 36.9 °C (98.4 °F) (Temporal)   Resp 18   Wt 89.1 kg (196 lb 6.9  oz)   SpO2 98%   BMI 34.80 kg/m²   Wt Readings from Last 3 Encounters:   24 89.1 kg (196 lb 6.9 oz)   24 87.8 kg (193 lb 9.6 oz)   24 89 kg (196 lb 3.4 oz)     ECOG Performance Status:  The ECOG performance scale today is ECO- Restricted in physically strenuous activity.  Carries out light duty.    Physical Exam  Vitals and nursing note reviewed.   Constitutional:       General: She is awake. She is not in acute distress.     Appearance: Normal appearance. She is not ill-appearing.   HENT:      Head: Normocephalic and atraumatic.   Eyes:      General: No scleral icterus.        Right eye: No discharge.         Left eye: No discharge.   Neck:      Trachea: Trachea and phonation normal. No tracheal tenderness.   Cardiovascular:      Rate and Rhythm: Normal rate and regular rhythm.      Heart sounds: No murmur heard.     No friction rub. No gallop.   Pulmonary:      Effort: Pulmonary effort is normal. No respiratory distress.      Breath sounds: Normal breath sounds. No wheezing.   Chest:   Breasts:     Right: Swelling, skin change and tenderness present. No mass or nipple discharge.      Left: No swelling, mass, nipple discharge, skin change or tenderness.      Comments: Right breast lymphedema, fibrosis, and distortion in shape. Mild tenderness, no mass.  Musculoskeletal:         General: No tenderness or deformity. Normal range of motion.      Cervical back: Normal range of motion and neck supple. No rigidity or tenderness.      Right lower leg: No edema.      Left lower leg: No edema.   Lymphadenopathy:      Cervical: No cervical adenopathy.      Upper Body:      Right upper body: No supraclavicular or axillary adenopathy.      Left upper body: No supraclavicular adenopathy.   Skin:     General: Skin is warm and dry.      Coloration: Skin is not jaundiced.      Findings: No lesion or rash.   Neurological:      General: No focal deficit present.      Mental Status: She is alert and oriented  to person, place, and time. Mental status is at baseline.      Gait: Gait normal.   Psychiatric:         Mood and Affect: Mood normal.         Behavior: Behavior normal.         Thought Content: Thought content normal.         Judgment: Judgment normal.         Laboratory Data:  Lab Results   Component Value Date    WBC 6.1 01/31/2024    HGB 14.2 01/31/2024    HCT 41.9 01/31/2024    MCV 93 01/31/2024     01/31/2024       Chemistry    Lab Results   Component Value Date/Time     01/31/2024 0952    K 3.3 (L) 01/31/2024 0952     01/31/2024 0952    CO2 26 01/31/2024 0952    BUN 9 01/31/2024 0952    CREATININE 0.55 01/31/2024 0952    Lab Results   Component Value Date/Time    CALCIUM 9.8 01/31/2024 0952    ALKPHOS 98 01/31/2024 0952    AST 32 01/31/2024 0952    ALT 22 01/31/2024 0952    BILITOT 0.9 01/31/2024 0952        Radiology:  === 05/18/24 ===    BI MR BREAST BILATERAL WITH CONTRAST FULL PROTOCOL    - Impression -  Posttreatment changes of the right breast. No MRI evidence of  malignancy in either breast.  Clinical follow-up is recommended.  Patient will be due for annual bilateral screening mammogram 06/2024.    BI-RADS CATEGORY:  BI-RADS Category:  2 Benign.  Recommendation:  Annual Screening.  Recommended Date:  1 Year.  Laterality:  Bilateral.    For any future breast imaging appointments, please call [phone]  ([phone]).      MACRO:  None    Signed by: Blessing Dewey 5/21/2024 12:29 PM  Dictation workstation:   [id number]      Assessment/Plan:  Yvrose John is a 59 y.o. female with a history of right ER+/Her2+ breast cancer, who presents today for follow-up evaluation on adjuvant anastrozole.    Breast cancer metastasized to axillary lymph node, right (CMS/HCC)  - Continue anastrozole 1 mg daily.  - Grade 1 hot flashes and arthralgias. Stable.  - MRI and mammogram recently without concerns for malignancy.  - DEXA 7/2023 normal. Continue q2 year screening.    Lymphedema of the right  breast.   - Remains significantly symptomatic.  - She has been working with - may be able to have PT covered by insurance now. Repeat referral placed.    Disposition.  - RTC 6 months.  - She has our contact information and was instructed to call with concerns/questions in the interim.    Orders Placed This Encounter   Procedures    Referral to Physical Therapy      Danny Chowdary MD  Hematology and Medical Oncology  [facility]

## 2024-07-31 ENCOUNTER — APPOINTMENT (OUTPATIENT)
Dept: HEMATOLOGY/ONCOLOGY | Facility: CLINIC | Age: 59
End: 2024-07-31
Payer: COMMERCIAL

## 2024-08-08 ENCOUNTER — EVALUATION (OUTPATIENT)
Dept: PHYSICAL THERAPY | Facility: HOSPITAL | Age: 59
End: 2024-08-08
Payer: COMMERCIAL

## 2024-08-08 DIAGNOSIS — Z92.3 S/P RADIATION THERAPY: ICD-10-CM

## 2024-08-08 DIAGNOSIS — C77.3 BREAST CANCER METASTASIZED TO AXILLARY LYMPH NODE, RIGHT (MULTI): ICD-10-CM

## 2024-08-08 DIAGNOSIS — I89.0 LYMPHEDEMA: Primary | ICD-10-CM

## 2024-08-08 DIAGNOSIS — C50.911 BREAST CANCER METASTASIZED TO AXILLARY LYMPH NODE, RIGHT (MULTI): ICD-10-CM

## 2024-08-08 PROCEDURE — 97162 PT EVAL MOD COMPLEX 30 MIN: CPT | Mod: GP | Performed by: PHYSICAL THERAPIST

## 2024-08-08 PROCEDURE — 97140 MANUAL THERAPY 1/> REGIONS: CPT | Mod: GP | Performed by: PHYSICAL THERAPIST

## 2024-08-08 PROCEDURE — 97110 THERAPEUTIC EXERCISES: CPT | Mod: GP | Performed by: PHYSICAL THERAPIST

## 2024-08-08 ASSESSMENT — ENCOUNTER SYMPTOMS
OCCASIONAL FEELINGS OF UNSTEADINESS: 0
LOSS OF SENSATION IN FEET: 0
DEPRESSION: 0

## 2024-08-08 NOTE — PROGRESS NOTES
Physical Therapy Evaluation and Treatment      Patient Name:Yvrose John   MRN:[id number]   Today's Date:8/8/2024   Referred by: Danny Chowdary   Time Calculation  Start Time: 1019  Stop Time: 1120  Time Calculation (min): 61 min  PT Evaluation Time Entry  PT Evaluation (Moderate) Time Entry: 36  PT Therapeutic Procedures Time Entry  Manual Therapy Time Entry: 15  Therapeutic Exercise Time Entry: 10     Assessment:   58 yo female with R breast cancer s/p lumpectomy and ALND, neoadjuvant and adjuvant chemo and radiation treatment, currently with significant firm and graf R breast, increased circumference R UE, consistent with stage 1-2 postmastectomy lymphedema, pt was seen in PT in the past and was denied flexitouch, has not tried compression, at this time, PT will work on MLD, trial of compression R UE and R breast (via chip bag and compression bra) to help with swelling reduction, pt also has decreased ROM R shoulder, instructed in shoulder ROM stretching HEP today, made a chip bag today, expectations reviewed with pt, will adjust as needed. Pt verbalized understanding.       Plan:  Visit number: 1   1-2x/week to work on MLD R breast, R UE, obtain Rx for compression bra, sleeve and glove, consider night time garment, compression R UE as able.   Adjust chip bag to R breast, can consider taping if tolerated.  Shoulder ROM, gentle strengthening when able.    Pt has tried flexitouch in the past, was denied by insurance.     PT Plan: Skilled PT  PT Frequency: 1 time per week  Duration: 12 weeks  Onset Date: 06/19/24  Number of Treatments Authorized: anthem 30V, no auth  Rehab Potential: Good  Plan of Care Agreement: Patient    Current Problem:   1. Lymphedema  Referral to Physical Therapy    Follow Up In Physical Therapy      2. Breast cancer metastasized to axillary lymph node, right (Multi)  Follow Up In Physical Therapy      3. S/P radiation therapy  Follow Up In Physical Therapy          Subjective     General:  General  Reason for Referral: lymphedema  Referred By: Danny Chowdary MD  Past Medical History Relevant to Rehab: recent hx of R breast lumpectomy, ALND, radiation, chemo. currently on anastrazole.  Preferred Learning Style: visual, verbal, written  Precautions:  Precautions  STEADI Fall Risk Score (The score of 4 or more indicates an increased risk of falling): 0  Precautions Comment: recent hx of R breast lumpectomy, ALND, radiation, chemo. currently on anastrazole.    CC/pain: R breast tightness and hardness, limited R shoulder overhead and reaching behind back.    Pain scale: Current      Worst           Best  Aggravated by:  Relieved by:    HPI: R breast cancer s/p neoadjuvant chemo, R lumpectomy and ALND 11/30/22, 4LN + for carcinoma, completed adjuvant chemo and radiation treatment March 2023, started noticing hardening and tightness of R breast since August 2023, was in therapy briefly late 2023, but limited due to insurance coverage, did not receive additional treatment, continues with lumpiness and tightness, hardness R breast, limited R shoulder ROM, further work up neg for recurring cancer, pt is then referred to PT for lymphedema.   Pt is RHD.  R arm no swelling but slight tight with T shirt.   Currently no further treatment for R shoulder, breast.    Body weight steady.    PMH: R breast cancer s/p lumpectomy, ALND, radiation, chemo. currently on anastrazole.     Social hx:  full time manager, desk job.  Independent with all activities.     Activity level at baseline: some walking (walking dog), energy level back.      Vital Signs:     Pain:     Home Living:     Prior Level of Function:  Prior Function Per Pt/Caregiver Report  Level of [place]: Independent with ADLs and functional transfers    Objective   Circumference R/L 8/8/24:  thumb IP 5.3/5.2  thumb CMC 5.6/5.6  Palm 16.8/16.5  Wrist 14.9/14.3  5cm above wrist 18.8/17.3  10cm above wrist 21.8/20.0  15cm above wrist 24.4/22.0  Elbow  24.8/23.0  5cm above elbow 27.5/26.0  10cm above elbow 29.5/27.8  15cm above elbow 31.4/29.0  Axilla 32.3/30.8  Length 41.5    ROM: R/L  Shoulder flex   130deg      abd    130deg         IR  50deg            ER 75deg supine, sitting abd to 90deg before onset of discomfort.        STRENGTH: R/L WNL B UE  Shoulder:  Elbow:  Wrist    Sensation: WNL    Skin appearance/condition:   Significant enlargement R sup breast and full appearance R breast throughout, neg pitting, pain with palpation, very firm R breast tissue, slight darkening R sup breast.    Neg cording R axilla today  Neg skin changes R UE today.      LLIS 37%    Outcome Measures:  Other Measures  Lymphedema Life Impact Scale (LLIS): 37     Treatments:  Treatment provided today:  Educated patient on lymphedema etiology, treatment rationale and expectations. All questions answered.  Educated on shoulder ROM stretching  supine shoulder flex, hands behind head, sidelying shoulder abd, standing wall shoulder flex, abd, and wall pushups (to issue handout next session).  Instructed pt to book appt with [employer] for bra fitting  Made a chip bag for R breast, to wear with bra/sports bra/post op bra as able consistently, can remove if not able to tolerate.    Reviewed with pt possible garment for arm sleeve.      Activity:       OP EDUCATION:  Outpatient Education  Individual(s) Educated: Patient  Education Provided: Home Exercise Program, Lymphedema care, POC  Risk and Benefits Discussed with Patient/Caregiver/Other: yes  Patient/Caregiver Demonstrated Understanding: yes  Plan of Care Discussed and Agreed Upon: yes  Patient Response to Education: Patient/Caregiver Verbalized Understanding of Information    Goals:  To be met at time of discharge:  --Decrease girth measurement of UE/LE by 2cm or until plateau.  -- Improve skin/tissue R breast less firm, able to adapt with gravity.   -- Increase ROM R shoulder flex and abd to 130deg or better for ease with  overhead activities.   -- Knowledgeable and compliant with home program, including lymphedema management and exercises.   -- Improve functional outcome on LLIS to <25%

## 2024-08-09 DIAGNOSIS — I89.0 LYMPHEDEMA: Primary | ICD-10-CM

## 2024-08-09 RX ORDER — IBUPROFEN 200 MG
CAPSULE ORAL
Qty: 1 EACH | Refills: 0 | Status: SHIPPED | OUTPATIENT
Start: 2024-08-09

## 2024-08-09 RX ORDER — IBUPROFEN 200 MG
CAPSULE ORAL
Qty: 1 EACH | Refills: 0 | Status: SHIPPED | OUTPATIENT
Start: 2024-08-09 | End: 2024-08-09

## 2024-08-09 NOTE — PROGRESS NOTES
Subjective :  Patient ID: Yvrose John is a 59 y.o. female.    History of Present Illness: History of right breast cancer metastasized to axillary lymph node, right. Right lumpectomy with removal of 18 lymph nodes 11/30/22 with Dr. Maldonado. She had 29 radiation treatments completed March 2023 and 20 chemo treatments completed October 2023. She has been established with PT for her right breast lymphedema. She has ongoing symptoms in the right breast- feels hard/heavy, tender at times.  No lymphedema in her arm. She is unhappy with the asymmetry of her breast and would like to know her surgical options.     6/6/24 Mammogram IMPRESSION:   Posttreatment changes of the right breast again visualized. No   mammographic evidence of malignancy.     Review of Systems  ROS: All 10 systems were reviewed and are unremarkable except for those mentioned in HPI.     Objective :  Physical Exam  Vitals and nursing note reviewed. Exam conducted with a chaperone present.   Constitutional:       General: She is not in acute distress.     Appearance: She is not ill-appearing.   Eyes:      Extraocular Movements: Extraocular movements intact.      Conjunctiva/sclera: Conjunctivae normal.      Pupils: Pupils are equal, round, and reactive to light.   Cardiovascular:      Rate and Rhythm: Normal rate and regular rhythm.      Pulses: Normal pulses.   Pulmonary:      Effort: Pulmonary effort is normal.      Breath sounds: Normal breath sounds.   Abdominal:      Palpations: Abdomen is soft. There is no mass.      Tenderness: There is no abdominal tenderness.      Hernia: No hernia is present.   Musculoskeletal:         General: No swelling or tenderness.      Cervical back: Normal range of motion and neck supple.   Skin:     Capillary Refill: Capillary refill takes less than 2 seconds.      Coloration: Skin is not jaundiced.      Findings: No bruising or rash.   Neurological:      General: No focal deficit present.      Mental Status: She is  oriented to person, place, and time.   Psychiatric:         Mood and Affect: Mood normal.         Behavior: Behavior normal.         Thought Content: Thought content normal.         Judgment: Judgment normal.     Detailed Breast Exam:  Right Breast appears fibrotic and has orange peel skin. It is edematous and cone shaped. No masses.   Left breast: hypertrophic with grade III ptosis. No masses.   Right upper extremity lymphedema was not appreciated clinically.     Assessment/Plan :    Patient presents with severe radiation-induced skin changes at the right breast which appears severely edematous, and deformed into cone shape. The breast skin especially around the nipple-areola complex has distinct orange peel appearance. Patient stated that the breast is not painful, but it becomes painful when pressured. She additionally was told that she has right upper extremity lymphedema but without significant clinical presentation. Patient seeks treatment for the right breast.     I reviewed with patient breast MRI which was negative for malignancies but showed skin thickening and changes post radiotherapy.     We discussed treatment option including non surgical vs completion mastectomy and autologous breast reconstruction.  The radiation-induced changes, edema and breast deformity are too severe to be addressed non surgically. I also do not think that lymphatic reconstruction will reverse the current changes at the breast.   I therefore believe completion mastectomy with simultaneous autologous reconstruction is the way forward.     We discussed autologous breast reconstruction with abdominal tissue including BULMARO, MS_TRAM and TRAM, and pros and cons were presented to patient and her . I also reviewed perioperative course and need for rehabilitation. Possible risks of deep vein thrombosis and pulmonary embolism, infection, wound healing issues, seroma, bleeding and hematoma, partial or total flap necrosis, need for  additional surgeries were reviewed thoroughly.     Patient verbalized good understanding and would like to proceed.     Plan of care:     Plan: Abdominal CTA with office follow up after completion of exam to further discuss surgery

## 2024-08-15 ENCOUNTER — TREATMENT (OUTPATIENT)
Dept: PHYSICAL THERAPY | Facility: HOSPITAL | Age: 59
End: 2024-08-15
Payer: COMMERCIAL

## 2024-08-15 DIAGNOSIS — C50.911 BREAST CANCER METASTASIZED TO AXILLARY LYMPH NODE, RIGHT (MULTI): ICD-10-CM

## 2024-08-15 DIAGNOSIS — I89.0 LYMPHEDEMA: Primary | ICD-10-CM

## 2024-08-15 DIAGNOSIS — Z92.3 S/P RADIATION THERAPY: ICD-10-CM

## 2024-08-15 DIAGNOSIS — C77.3 BREAST CANCER METASTASIZED TO AXILLARY LYMPH NODE, RIGHT (MULTI): ICD-10-CM

## 2024-08-15 PROCEDURE — 97140 MANUAL THERAPY 1/> REGIONS: CPT | Mod: GP | Performed by: PHYSICAL THERAPIST

## 2024-08-15 NOTE — PROGRESS NOTES
Physical Therapy Treatment    Patient Name: Yvrose John  MRN: [id number]  Today's Date: 8/15/2024  Time Calculation  Start Time: 1040  Stop Time: 1130  Time Calculation (min): 50 min  Therapeutic Procedure PT Therapeutic Procedures Time Entry  Manual Therapy Time Entry: 40  Self-Care/Home Mgmt Training: 10 minutes     Therapy Diagnosis   Problem List Items Addressed This Visit             ICD-10-CM       Hematology and Neoplasia    Breast cancer metastasized to axillary lymph node, right (Multi) C50.911, C77.3    S/P radiation therapy Z92.3       Symptoms and Signs    Lymphedema - Primary I89.0       Assessment:   Pt is tolerating chip bag to R breast with a few hours of break in between, will obtain compression bra with swell spot and R UE garment (Rx provided, pt to go to [facility]).  Worked on MLD R breast, trial of taping to help with graf R breast.  Educated pt to continue with shoulder ROM as able.      Plan:  Visit number: 2   Onset Date: 6/19/2024  Continue POC for MLD R breast, R UE as needed, obtain compression bra and UE garment,   Assess effect of taping, repeat as able,   Can consider night time garment when ready, can revisit flexitouch when able.      OP PT Plan  PT Plan: Skilled PT  PT Frequency: 1 time per week  Duration: 12 weeks  Onset Date: 06/19/24  Number of Treatments Authorized: anthem 30V, no auth  Rehab Potential: Good  Plan of Care Agreement: Patient    Current Problem  1. Lymphedema  Follow Up In Physical Therapy      2. Breast cancer metastasized to axillary lymph node, right (Multi)  Follow Up In Physical Therapy      3. S/P radiation therapy  Follow Up In Physical Therapy          Subjective   Pt states she is able to tolerate the chip bag, using post surgical bra with chip bag, feels slight change of breast shape for now, but not able to tolerate constantly.   Pt states she is scheduled with [facility] for bra fitting.    General  Reason for Referral:  lymphedema  Referred By: Danny Chowdary MD  Past Medical History Relevant to Rehab: recent hx of R breast lumpectomy, ALND, radiation, chemo. currently on anastrazole.  Preferred Learning Style: visual, verbal, written  Precautions  Precautions  STEADI Fall Risk Score (The score of 4 or more indicates an increased risk of falling): 0  Precautions Comment: recent hx of R breast lumpectomy, ALND, radiation, chemo. currently on anastrazole.  Precautions/Fall Risk:fall risk no Pacemaker no  Seizures No  Post Op Movement/Restrictions No  Vital Signs     Pain     Current Pain Level (0-10): 0    HEP Compliance: Good    Objective   R breast remains graf sup breast and with firm pointing shape   Skin slight indentation noted upon removal of chip bag as expected   R UE neg pitting,   Obtained prescription for R breast compression bra with swell spot, recommend wearease beata bra with swell spot or JOBST bellisse with jovipack  Also recommend pt to obtain CCL1 sleeve and gauntlet for RUE    Outcome Measures:       Treatments:     Therapeutic Procedure PT Therapeutic Procedures Time Entry  Manual Therapy Time Entry: 40  Self-Care/Home Mgmt Training: 10 minutes     Manual Therapy 40 minutes  MLD neck, ant AA anastamosis  MLD R breast  Kinesiotaping from R supraclavical to R sup breast and L axilla to R sup breast, expectations and rationale reviewed with pt, can remove if not able to tolerate.      Self management:  Provided pt prescription for R breast compression bra with swell spot, recommend wearease beata bra with swell spot or JOBST bellisse with jovipack  Also recommend pt to obtain CCL1 sleeve and gauntlet for RUE  Pt verbalized understanding.     Neuromuscular Re-ed   minutes    Gait Training   minutes    Modalities   Vasopneumatic Device       minutes  Electrical Stimulation          minutes  Ultrasound            minutes  Iontophoresis                     minutes  Cold Pack            minutes  Mechanical Traction            minutes    Activity:       OP EDUCATION:  Outpatient Education  Individual(s) Educated: Patient  Education Provided: Home Exercise Program, Lymphedema care, POC  Risk and Benefits Discussed with Patient/Caregiver/Other: yes  Patient/Caregiver Demonstrated Understanding: yes  Plan of Care Discussed and Agreed Upon: yes  Patient Response to Education: Patient/Caregiver Verbalized Understanding of Information    Goals:   To be met at time of discharge:  --Decrease girth measurement of UE/LE by 2cm or until plateau.  -- Improve skin/tissue R breast less firm, able to adapt with gravity.   -- Increase ROM R shoulder flex and abd to 130deg or better for ease with overhead activities.   -- Knowledgeable and compliant with home program, including lymphedema management and exercises.   -- Improve functional outcome on LLIS to <25%

## 2024-08-21 ENCOUNTER — APPOINTMENT (OUTPATIENT)
Dept: PLASTIC SURGERY | Facility: CLINIC | Age: 59
End: 2024-08-21
Payer: COMMERCIAL

## 2024-08-21 VITALS
DIASTOLIC BLOOD PRESSURE: 84 MMHG | HEART RATE: 71 BPM | SYSTOLIC BLOOD PRESSURE: 130 MMHG | OXYGEN SATURATION: 100 % | WEIGHT: 199 LBS | HEIGHT: 63 IN | BODY MASS INDEX: 35.26 KG/M2 | TEMPERATURE: 97.5 F

## 2024-08-21 DIAGNOSIS — I89.0 LYMPHEDEMA OF RIGHT UPPER EXTREMITY: ICD-10-CM

## 2024-08-21 DIAGNOSIS — Z85.3 HISTORY OF BREAST CANCER: ICD-10-CM

## 2024-08-21 DIAGNOSIS — Z01.818 PRE-OP EXAM: ICD-10-CM

## 2024-08-21 DIAGNOSIS — N64.89 POSTOPERATIVE DEFORMITY OF BREAST: Primary | ICD-10-CM
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
## 2024-08-21 DIAGNOSIS — Z92.3 HISTORY OF RADIATION THERAPY: ICD-10-CM

## 2024-08-21 PROCEDURE — 3008F BODY MASS INDEX DOCD: CPT

## 2024-08-21 PROCEDURE — 99204 OFFICE O/P NEW MOD 45 MIN: CPT

## 2024-08-21 PROCEDURE — 1036F TOBACCO NON-USER: CPT

## 2024-08-21 ASSESSMENT — PATIENT HEALTH QUESTIONNAIRE - PHQ9
1. LITTLE INTEREST OR PLEASURE IN DOING THINGS: NOT AT ALL
2. FEELING DOWN, DEPRESSED OR HOPELESS: NOT AT ALL
SUM OF ALL RESPONSES TO PHQ9 QUESTIONS 1 & 2: 0

## 2024-08-21 ASSESSMENT — ENCOUNTER SYMPTOMS
DEPRESSION: 0
LOSS OF SENSATION IN FEET: 0
OCCASIONAL FEELINGS OF UNSTEADINESS: 0

## 2024-08-21 ASSESSMENT — PAIN SCALES - GENERAL: PAINLEVEL: 0-NO PAIN

## 2024-08-26 ENCOUNTER — TREATMENT (OUTPATIENT)
Dept: PHYSICAL THERAPY | Facility: HOSPITAL | Age: 59
End: 2024-08-26
Payer: COMMERCIAL

## 2024-08-26 DIAGNOSIS — C77.3 BREAST CANCER METASTASIZED TO AXILLARY LYMPH NODE, RIGHT (MULTI): ICD-10-CM

## 2024-08-26 DIAGNOSIS — Z92.3 S/P RADIATION THERAPY: ICD-10-CM

## 2024-08-26 DIAGNOSIS — C50.911 BREAST CANCER METASTASIZED TO AXILLARY LYMPH NODE, RIGHT (MULTI): ICD-10-CM

## 2024-08-26 DIAGNOSIS — I89.0 LYMPHEDEMA: Primary | ICD-10-CM

## 2024-08-26 PROCEDURE — 97535 SELF CARE MNGMENT TRAINING: CPT | Mod: GP | Performed by: PHYSICAL THERAPIST

## 2024-08-26 PROCEDURE — 97140 MANUAL THERAPY 1/> REGIONS: CPT | Mod: GP | Performed by: PHYSICAL THERAPIST

## 2024-08-26 NOTE — PROGRESS NOTES
Physical Therapy Treatment    Patient Name: Yvrose John  MRN: [id number]  Today's Date: 8/26/2024  Time Calculation  Start Time: 1055  Stop Time: 1150  Time Calculation (min): 55 min   PT Therapeutic Procedures Time Entry  Manual Therapy Time Entry: 45  Self-Care/Home Mgmt Training: 10 minutes     Therapy Diagnosis   Problem List Items Addressed This Visit             ICD-10-CM       Hematology and Neoplasia    Breast cancer metastasized to axillary lymph node, right (Multi) C50.911, C77.3    S/P radiation therapy Z92.3       Symptoms and Signs    Lymphedema - Primary I89.0       Assessment:   Pt is tolerating chip bag to R breast, not able to obtain proper compression bra with swell spot, pt is provided on information to choose between wearease Fina or compression bra with swell spot, or JOBST Bellisse bra with jovipack options.  Pt has obtained Juzo soft sleeve and gauntlet, tolerating well, care options and self management reviewed with pt on sleeve and gauntlet.   Repeated MLD R breast, not able to tolerate taping, discontinued.     Educated pt to continue with shoulder ROM as able.      Plan:  Visit number: 3   Onset Date: 6/19/2024    Continue POC for MLD R breast, R UE as needed,   Continue with current sleeve and gauntlet, obtain new compression bra, options discussed (earease Fina or compression bra with swell spot, or JOBST Bellisse bra with jovipack).   Can consider night time garment when ready and needed, can revisit flexitouch when able.      OP PT Plan  PT Plan: Skilled PT  PT Frequency: 1 time per week  Duration: 12 weeks  Onset Date: 06/19/24  Number of Treatments Authorized: anthem 30V, no auth  Rehab Potential: Good  Plan of Care Agreement: Patient    Current Problem  1. Lymphedema  Follow Up In Physical Therapy      2. Breast cancer metastasized to axillary lymph node, right (Multi)  Follow Up In Physical Therapy      3. S/P radiation therapy  Follow Up In Physical Therapy           Subjective   Pt states she obtained her sleeve and glove, was not able to get a bra (no bra with pocket available at [facility]).    Seen by plastic surgeon, was thinking it was due to fibrosis vs lymphedema, was planning for surgery likely mastectomy and likely reconstruction.   Tolerating her sleeve and glove.      General  Reason for Referral: lymphedema  Referred By: Danny Chowdary MD  Past Medical History Relevant to Rehab: recent hx of R breast lumpectomy, ALND, radiation, chemo. currently on anastrazole.  Preferred Learning Style: visual, verbal, written  Precautions  Precautions  Precautions Comment: recent hx of R breast lumpectomy, ALND, radiation, chemo. currently on anastrazole.  Precautions/Fall Risk:fall risk no Pacemaker no  Seizures No  Post Op Movement/Restrictions No  Vital Signs     Pain     Current Pain Level (0-10): 0    HEP Compliance: Good    Objective   Reviewed with pt garment options;  Juzo soft sleeve 20-30mmHg size 2, juzo seamless gauntlet 20-30mmHg small.      R breast remains graf sup breast and with firm pointing shape   Skin slight indentation noted upon removal of chip bag as expected   R UE neg pitting,  will obtain bra options (wearease Fina or compression bra with swell spot, or JOBST Bellisse bra with jovipack)    Outcome Measures:       Treatments:     Therapeutic Procedure PT Therapeutic Procedures Time Entry  Manual Therapy Time Entry: 45  Self-Care/Home Mgmt Training: 10 minutes     Manual Therapy 45 minutes  MLD neck, ant AA anastamosis  MLD R breast      Self management:  Discussed compression bra options (wearease Fina or compression bra with swell spot, or JOBST Bellisse bra with jovipack), pt will try [facility].  Continue with current juzo soft sleeve and gauntlet, obtain additional sleeve and gauntlet to switch, care options, and self managemen for R UE details reviewed.      Activity:       OP EDUCATION:  Outpatient Education  Individual(s)  Educated: Patient  Education Provided: Home Exercise Program, Lymphedema care, POC  Risk and Benefits Discussed with Patient/Caregiver/Other: yes  Patient/Caregiver Demonstrated Understanding: yes  Plan of Care Discussed and Agreed Upon: yes  Patient Response to Education: Patient/Caregiver Verbalized Understanding of Information    Goals:   To be met at time of discharge:  --Decrease girth measurement of UE/LE by 2cm or until plateau.  -- Improve skin/tissue R breast less firm, able to adapt with gravity.   -- Increase ROM R shoulder flex and abd to 130deg or better for ease with overhead activities.   -- Knowledgeable and compliant with home program, including lymphedema management and exercises.   -- Improve functional outcome on LLIS to <25%

## 2024-09-05 ENCOUNTER — TREATMENT (OUTPATIENT)
Dept: PHYSICAL THERAPY | Facility: HOSPITAL | Age: 59
End: 2024-09-05
Payer: COMMERCIAL

## 2024-09-05 DIAGNOSIS — C50.911 BREAST CANCER METASTASIZED TO AXILLARY LYMPH NODE, RIGHT (MULTI): ICD-10-CM

## 2024-09-05 DIAGNOSIS — Z92.3 S/P RADIATION THERAPY: ICD-10-CM

## 2024-09-05 DIAGNOSIS — I89.0 LYMPHEDEMA: ICD-10-CM

## 2024-09-05 DIAGNOSIS — C77.3 BREAST CANCER METASTASIZED TO AXILLARY LYMPH NODE, RIGHT (MULTI): ICD-10-CM

## 2024-09-05 PROCEDURE — 97140 MANUAL THERAPY 1/> REGIONS: CPT | Mod: GP | Performed by: PHYSICAL THERAPIST

## 2024-09-05 NOTE — PROGRESS NOTES
Physical Therapy Treatment    Patient Name: Yvrose John  MRN: [id number]  Today's Date: 9/5/2024  Time Calculation  Start Time: 1100  Stop Time: 1155  Time Calculation (min): 55 min   PT Therapeutic Procedures Time Entry  Manual Therapy Time Entry: 55 minutes     Therapy Diagnosis   Problem List Items Addressed This Visit             ICD-10-CM       Hematology and Neoplasia    Breast cancer metastasized to axillary lymph node, right (Multi) C50.911, C77.3    S/P radiation therapy Z92.3       Symptoms and Signs    Lymphedema I89.0       Assessment:   Pt is tolerating chip bag to R breast, not able to obtain proper compression bra with swell spot, pt is provided on information to choose between wearease Fina or compression bra with swell spot, or JOBST Bellisse bra with jovipack options, pt will continue to explore options via [facility].    Pt has obtained Juzo soft sleeve and gauntlet, tolerating well, care options and self management reviewed with pt on sleeve and gauntlet.   Repeated MLD R breast, with addition to chest, R UE, tolerated well, pt is not able to tolerate taping, discontinued.     Educated pt to continue with shoulder ROM as able.      Plan:  Visit number: 4   Onset Date: 6/19/2024    Continue POC for MLD R breast, R UE as needed,   Continue with current sleeve and gauntlet, obtain new compression bra, options discussed (earease Fina or compression bra with swell spot, or JOBST Bellisse bra with jovipack).   Can consider night time garment when ready and needed, can revisit flexitouch when able.      OP PT Plan  PT Plan: Skilled PT  PT Frequency: 1 time per week  Duration: 12 weeks  Onset Date: 06/19/24  Number of Treatments Authorized: anthem 30V, no auth  Rehab Potential: Good  Plan of Care Agreement: Patient    Current Problem  1. Lymphedema  Follow Up In Physical Therapy      2. Breast cancer metastasized to axillary lymph node, right (Multi)  Follow Up In Physical Therapy      3. S/P  radiation therapy  Follow Up In Physical Therapy          Subjective   Pt states she was tolerating her sleeve and gauntlet, still not able to get bra options via [facility], will try another one later.    Seen by plastic surgeon, was thinking it was due to fibrosis vs lymphedema, was planning for surgery likely mastectomy and likely reconstruction.   Tolerating her sleeve and glove.      General  Reason for Referral: lymphedema  Referred By: Danny Chowdary MD  Past Medical History Relevant to Rehab: recent hx of R breast lumpectomy, ALND, radiation, chemo. currently on anastrazole.  Preferred Learning Style: visual, verbal, written  Precautions  Precautions  Precautions Comment: recent hx of R breast lumpectomy, ALND, radiation, chemo. currently on anastrazole.  Precautions/Fall Risk:fall risk no Pacemaker no  Seizures No  Post Op Movement/Restrictions No  Vital Signs     Pain     Current Pain Level (0-10): 0    HEP Compliance: Good    Objective   Continue R UE garment with Juzo soft sleeve 20-30mmHg size 2, juzo seamless gauntlet 20-30mmHg small.      R breast remains graf sup breast and with firm pointing shape   Skin slight indentation noted upon removal of chip bag as expected   R UE neg pitting,  will obtain bra options (wearease Fina or compression bra with swell spot, or JOBST Bellisse bra with jovipack)    Outcome Measures:       Treatments:     Therapeutic Procedure PT Therapeutic Procedures Time Entry  Manual Therapy Time Entry: 55 minutes     Manual Therapy 55 minutes  MLD neck, abdomen I and II  MLD B axilla and B trunk  MLD ant AA anastamosis  MLD R breast  MLD R UE      Activity:       OP EDUCATION:  Outpatient Education  Individual(s) Educated: Patient  Education Provided: Home Exercise Program, Lymphedema care, POC  Risk and Benefits Discussed with Patient/Caregiver/Other: yes  Patient/Caregiver Demonstrated Understanding: yes  Plan of Care Discussed and Agreed Upon: yes  Patient Response to  Education: Patient/Caregiver Verbalized Understanding of Information    Goals:   To be met at time of discharge:  --Decrease girth measurement of UE/LE by 2cm or until plateau.  -- Improve skin/tissue R breast less firm, able to adapt with gravity.   -- Increase ROM R shoulder flex and abd to 130deg or better for ease with overhead activities.   -- Knowledgeable and compliant with home program, including lymphedema management and exercises.   -- Improve functional outcome on LLIS to <25%

## 2024-09-09 ENCOUNTER — HOSPITAL ENCOUNTER (OUTPATIENT)
Dept: RADIOLOGY | Facility: HOSPITAL | Age: 59
Discharge: HOME | End: 2024-09-09
Payer: COMMERCIAL

## 2024-09-09 ENCOUNTER — APPOINTMENT (OUTPATIENT)
Dept: PHYSICAL THERAPY | Facility: CLINIC | Age: 59
End: 2024-09-09
Payer: COMMERCIAL

## 2024-09-09 DIAGNOSIS — Z01.818 PRE-OP EXAM: ICD-10-CM

## 2024-09-09 DIAGNOSIS — Z85.3 HISTORY OF BREAST CANCER: ICD-10-CM

## 2024-09-09 DIAGNOSIS — Z92.3 HISTORY OF RADIATION THERAPY: ICD-10-CM

## 2024-09-09 PROCEDURE — 2550000001 HC RX 255 CONTRASTS

## 2024-09-09 PROCEDURE — 74174 CTA ABD&PLVS W/CONTRAST: CPT

## 2024-09-09 NOTE — PROGRESS NOTES
Subjective :  Patient ID: Yvrose John is a 59 y.o. female.    History of Present Illness: History of right breast cancer metastasized to axillary lymph node, right. Right lumpectomy with removal of 18 lymph nodes 11/30/22 with Dr. Maldonado. She had 29 radiation treatments completed March 2023 and 20 chemo treatments completed October 2023. She has been established with PT for her right breast lymphedema. She has ongoing symptoms in the right breast- feels hard/heavy, tender at times.  No lymphedema in her arm. She is unhappy with the asymmetry of her breast and presents today to follow up to review reconstruction options after completion of her CTA.    6/6/24 Mammogram IMPRESSION:   Posttreatment changes of the right breast again visualized. No   mammographic evidence of malignancy.     CT ANGIO ABDOMEN PELVIS W AND/OR WO IV IV CONTRAST;  9/9/2024 12:17 pm      INDICATION:  Signs/Symptoms:pre-op planning for BULMARO breast reconsruction.      COMPARISON:  CT chest abdomen and pelvis 04/20/2022      ACCESSION NUMBER(S):  [id number]      ORDERING CLINICIAN:  JAILYN CAVAZOS      TECHNIQUE:  Thin-section axial images of the abdomen and pelvis were obtained in  the arterial  phase after intravenous administration of 90 mL  Omnipaque 350 contrast. Sagittal and coronal reformatted images were  provided. MIP images and 3D reconstructions were created on an  independent workstation and reviewed.      FINDINGS:  VASCULATURE:      ABDOMINAL AORTA: No abdominal aortic aneurysm or dissection. No  significant abdominal aortic atherosclerosis.      ABDOMINAL and PELVIC ARTERIES: Unremarkable appearance of the celiac  artery superior mesenteric artery, bilateral renal arteries, and  inferior mesenteric arteries without evidence of hemodynamically  significant stenosis or occlusion. The iliac vasculature is  unremarkable without evidence of hemodynamically significant  stenosis/occlusion.          LOWER CHEST:  Right-greater-than-left basilar atelectasis. No pleural  effusions. Heart is normal in size. No pericardial effusion. Skin  thickening and posttreatment changes are partially visualized in the  right breast.          ABDOMEN/PELVIS:      ABDOMINAL WALL: Tiny fat containing umbilical hernia.      LIVER: Stable appearance of numerous hypodense cystic lesions  throughout the liver with the largest in the left lobe measuring 1.3  cm, previously measuring the same, and another measuring 1.1 cm in  the liver dome, previously measuring the same. Findings are most  consistent with simple hepatic cysts. Liver measures 17.5 cm in  craniocaudal dimension.      BILE DUCTS: No significant intrahepatic or extrahepatic dilatation.      GALLBLADDER: Status post cholecystectomy.      PANCREAS: No significant abnormality.      SPLEEN: No significant abnormality.      ADRENALS: No significant abnormality.      KIDNEYS, URETERS, BLADDER: Hypodense cystic lesions throughout the  bilateral kidneys similar to prior examination most consistent with  hemorrhagic/proteinaceous cysts. No hydroureteronephrosis or  nephroureterolithiasis. No bladder wall thickening.      REPRODUCTIVE ORGANS: No significant abnormality.      VESSELS: No significant abnormality.      RETROPERITONEUM/LYMPH NODES: No enlarged lymph nodes.      BOWEL/MESENTERY/PERITONEUM: Tiny hiatal hernia. Stomach is  unremarkable. No inflammatory bowel wall thickening or dilatation.  Normal appendix.      No ascites, free air, or fluid collection.      MUSCULOSKELETAL: No acute osseous abnormality.      IMPRESSION:  1. Unremarkable appearance of the visualized vasculature.  2. No acute process within the abdomen or pelvis.  3. Skin thickening and posttreatment changes in the right breast.  4. Additional chronic findings as above.        Review of Systems  ROS: All 10 systems were reviewed and are unremarkable except for those mentioned in HPI.     Objective :  Physical  Exam  Vitals and nursing note reviewed. Exam conducted with a chaperone present.   Constitutional:       General: She is not in acute distress.     Appearance: She is not ill-appearing.   Eyes:      Extraocular Movements: Extraocular movements intact.      Conjunctiva/sclera: Conjunctivae normal.      Pupils: Pupils are equal, round, and reactive to light.   Cardiovascular:      Rate and Rhythm: Normal rate and regular rhythm.      Pulses: Normal pulses.   Pulmonary:      Effort: Pulmonary effort is normal.      Breath sounds: Normal breath sounds.   Abdominal:      Palpations: Abdomen is soft. There is no mass.      Tenderness: There is no abdominal tenderness.      Hernia: No hernia is present.   Musculoskeletal:         General: No swelling or tenderness.      Cervical back: Normal range of motion and neck supple.   Skin:     Capillary Refill: Capillary refill takes less than 2 seconds.      Coloration: Skin is not jaundiced.      Findings: No bruising or rash.   Neurological:      General: No focal deficit present.      Mental Status: She is oriented to person, place, and time.   Psychiatric:         Mood and Affect: Mood normal.         Behavior: Behavior normal.         Thought Content: Thought content normal.         Judgment: Judgment normal.     Detailed Breast Exam:  Right Breast appears fibrotic and has orange peel skin. It is edematous and cone shaped. No masses.   Left breast: hypertrophic with grade III ptosis. No masses.   Right upper extremity lymphedema was not appreciated clinically.     Assessment/Plan :    Patient presents with severe radiation-induced skin changes at the right breast which appears severely edematous, and deformed into cone shape. The breast skin especially around the nipple-areola complex has distinct orange peel appearance. Patient stated that the breast is not painful, but it becomes painful when pressured. She additionally was told that she has right upper extremity  lymphedema but without significant clinical presentation. Patient seeks treatment for the right breast.     We discussed completion mastectomy and autologous breast reconstruction via BULMARO flap.    We reviewed patient's CT scan and noted the presence of adequate IDEA perforators for BULMARO flap, and diastasis recti of 4.7 cm, and chronic findings such as liver and kidney cysts.     We discussed autologous breast reconstruction with BULMARO flap in detail, plication of abdominal wall fascia, and we reviewed perioperative course and recovery. I reviewed possible risks of reaction to medication, deep vein thrombosis and pulmonary embolism, infection, wound healing issues, seroma, bleeding and hematoma, partial or total flap necrosis, abdominal wall weakness or bulge, wound healing issues, asymmetry need for additional surgeries.     Patient verbalized good understanding and would like to proceed.     Plan of care:    Booking sheet for BULMARO flap surgery in March or April 2025 and follow up 2 weeks pre-operatively. Referral to breast surgeon for completion mastectomy.

## 2024-09-13 ENCOUNTER — TREATMENT (OUTPATIENT)
Dept: PHYSICAL THERAPY | Facility: HOSPITAL | Age: 59
End: 2024-09-13
Payer: COMMERCIAL

## 2024-09-13 DIAGNOSIS — C50.911 BREAST CANCER METASTASIZED TO AXILLARY LYMPH NODE, RIGHT (MULTI): ICD-10-CM

## 2024-09-13 DIAGNOSIS — I89.0 LYMPHEDEMA: Primary | ICD-10-CM

## 2024-09-13 DIAGNOSIS — C77.3 BREAST CANCER METASTASIZED TO AXILLARY LYMPH NODE, RIGHT (MULTI): ICD-10-CM

## 2024-09-13 DIAGNOSIS — Z92.3 S/P RADIATION THERAPY: ICD-10-CM

## 2024-09-13 PROCEDURE — 97140 MANUAL THERAPY 1/> REGIONS: CPT | Mod: GP | Performed by: PHYSICAL THERAPIST

## 2024-09-13 NOTE — PROGRESS NOTES
Physical Therapy Treatment    Patient Name: Yvrose John  MRN: [id number]  Today's Date: 2024  Time Calculation  Start Time: 1215  Stop Time: 1310  Time Calculation (min): 55 min   PT Therapeutic Procedures Time Entry  Manual Therapy Time Entry: 50  Self-Care/Home Mgmt Trainin minutes     Therapy Diagnosis   Problem List Items Addressed This Visit             ICD-10-CM       Hematology and Neoplasia    Breast cancer metastasized to axillary lymph node, right (Multi) C50.911, C77.3    S/P radiation therapy Z92.3       Symptoms and Signs    Lymphedema - Primary I89.0       Assessment:   Pt is tolerating chip bag to R breast, purchased a prairie wear bra, discussed further options of bra compression ( beata bra or bellisse with jovipak.), repeated MLD R UE and R breast, tolerated well.   No big change of R breast shape yet, softer overall.   R UE doing well at this time.   Pt has obtained Juzo soft sleeve and gauntlet, tolerating well, care options and self management reviewed with pt on sleeve and gauntlet.     Educated pt to continue with shoulder ROM as able.      Plan:  Visit number: 5   Onset Date: 2024    Measure for R UE next session, continue with MLD for R breast and R UE.      Continue with current sleeve and gauntlet, information provided for beata bra or bellisse with jovipak.     Can consider night time garment when ready and needed, can revisit flexitouch when able.      OP PT Plan  PT Plan: Skilled PT  PT Frequency: 1 time per week  Duration: 12 weeks  Onset Date: 24  Number of Treatments Authorized: anthem 30V, no auth  Rehab Potential: Good  Plan of Care Agreement: Patient    Current Problem  1. Lymphedema  Follow Up In Physical Therapy      2. Breast cancer metastasized to axillary lymph node, right (Multi)  Follow Up In Physical Therapy      3. S/P radiation therapy  Follow Up In Physical Therapy          Subjective   Pt states she was tolerating her sleeve and gauntlet,  bought a bra online (prairie wear), using it with her current bra,  not able to get bra options via [employer], will try another one later.    Seen by plastic surgeon, was thinking it was due to fibrosis vs lymphedema, was planning for surgery likely mastectomy and likely reconstruction.   Tolerating her sleeve and glove.      General  Reason for Referral: lymphedema  Referred By: Danny Chowdary MD  Past Medical History Relevant to Rehab: recent hx of R breast lumpectomy, ALND, radiation, chemo. currently on anastrazole.  Preferred Learning Style: visual, verbal, written  Precautions  Precautions  Precautions Comment: recent hx of R breast lumpectomy, ALND, radiation, chemo. currently on anastrazole.  Precautions/Fall Risk:fall risk no Pacemaker no  Seizures No  Post Op Movement/Restrictions No  Vital Signs     Pain     Current Pain Level (0-10): 0    HEP Compliance: Good    Objective   Continue R UE garment with Juzo soft sleeve 20-30mmHg size 2, juzo seamless gauntlet 20-30mmHg small.      R breast remains graf sup breast and with firm pointing shape   Skin slight indentation noted upon removal of chip bag as expected  Pt purchased a prairie wear bra, discussed further options of bra compression ( beata bra or bellisse with jovipak.),      No big change of R breast shape yet, softer overall.     Outcome Measures:       Treatments:     Therapeutic Procedure PT Therapeutic Procedures Time Entry  Manual Therapy Time Entry: 50  Self-Care/Home Mgmt Trainin minutes     Manual Therapy 50 minutes  MLD neck, abdomen I and II  MLD B axilla and B trunk  MLD ant AA anastamosis  MLD R breast  MLD R UE    Self management:  Pt has purchased a prairie wear bra, discussed further options of bra compression ( beata bra or bellisse with jovipak.), information provided.       Activity:       OP EDUCATION:  Outpatient Education  Individual(s) Educated: Patient  Education Provided: Home Exercise Program, Lymphedema care, POC  Risk  and Benefits Discussed with Patient/Caregiver/Other: yes  Patient/Caregiver Demonstrated Understanding: yes  Plan of Care Discussed and Agreed Upon: yes  Patient Response to Education: Patient/Caregiver Verbalized Understanding of Information    Goals:   To be met at time of discharge:  --Decrease girth measurement of UE/LE by 2cm or until plateau.  -- Improve skin/tissue R breast less firm, able to adapt with gravity.   -- Increase ROM R shoulder flex and abd to 130deg or better for ease with overhead activities.   -- Knowledgeable and compliant with home program, including lymphedema management and exercises.   -- Improve functional outcome on LLIS to <25%

## 2024-09-16 ENCOUNTER — APPOINTMENT (OUTPATIENT)
Dept: PLASTIC SURGERY | Facility: CLINIC | Age: 59
End: 2024-09-16
Payer: COMMERCIAL

## 2024-09-16 VITALS
OXYGEN SATURATION: 98 % | TEMPERATURE: 97.3 F | BODY MASS INDEX: 35.08 KG/M2 | SYSTOLIC BLOOD PRESSURE: 139 MMHG | WEIGHT: 198 LBS | DIASTOLIC BLOOD PRESSURE: 87 MMHG | HEART RATE: 68 BPM | HEIGHT: 63 IN

## 2024-09-16 DIAGNOSIS — Z92.3 S/P RADIATION THERAPY: Primary | ICD-10-CM

## 2024-09-16 DIAGNOSIS — Z71.89 ENCOUNTER TO DISCUSS BREAST RECONSTRUCTION: ICD-10-CM
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
## 2024-09-16 DIAGNOSIS — N64.89 BREAST DEFORMITY: ICD-10-CM

## 2024-09-16 PROCEDURE — 99214 OFFICE O/P EST MOD 30 MIN: CPT

## 2024-09-16 PROCEDURE — 1036F TOBACCO NON-USER: CPT

## 2024-09-16 PROCEDURE — 3008F BODY MASS INDEX DOCD: CPT

## 2024-09-16 ASSESSMENT — PATIENT HEALTH QUESTIONNAIRE - PHQ9
2. FEELING DOWN, DEPRESSED OR HOPELESS: NOT AT ALL
SUM OF ALL RESPONSES TO PHQ9 QUESTIONS 1 & 2: 0
1. LITTLE INTEREST OR PLEASURE IN DOING THINGS: NOT AT ALL

## 2024-09-16 ASSESSMENT — ENCOUNTER SYMPTOMS
DEPRESSION: 0
LOSS OF SENSATION IN FEET: 0
OCCASIONAL FEELINGS OF UNSTEADINESS: 0

## 2024-09-16 ASSESSMENT — PAIN SCALES - GENERAL: PAINLEVEL: 0-NO PAIN

## 2024-09-23 ENCOUNTER — TREATMENT (OUTPATIENT)
Dept: PHYSICAL THERAPY | Facility: HOSPITAL | Age: 59
End: 2024-09-23
Payer: COMMERCIAL

## 2024-09-23 DIAGNOSIS — I89.0 LYMPHEDEMA: Primary | ICD-10-CM

## 2024-09-23 DIAGNOSIS — C77.3 BREAST CANCER METASTASIZED TO AXILLARY LYMPH NODE, RIGHT (MULTI): ICD-10-CM

## 2024-09-23 DIAGNOSIS — Z92.3 S/P RADIATION THERAPY: ICD-10-CM

## 2024-09-23 DIAGNOSIS — C50.911 BREAST CANCER METASTASIZED TO AXILLARY LYMPH NODE, RIGHT (MULTI): ICD-10-CM

## 2024-09-23 PROCEDURE — 97140 MANUAL THERAPY 1/> REGIONS: CPT | Mod: GP | Performed by: PHYSICAL THERAPIST

## 2024-09-23 NOTE — PROGRESS NOTES
Physical Therapy Treatment    Patient Name: Yvrose John  MRN: [id number]  Today's Date: 9/23/2024  Time Calculation  Start Time: 1015  Stop Time: 1110  Time Calculation (min): 55 min   PT Therapeutic Procedures Time Entry  Manual Therapy Time Entry: 55 minutes     Therapy Diagnosis   Problem List Items Addressed This Visit             ICD-10-CM       Hematology and Neoplasia    Breast cancer metastasized to axillary lymph node, right (Multi) C50.911, C77.3    S/P radiation therapy Z92.3       Symptoms and Signs    Lymphedema - Primary I89.0       Assessment:   Pt is tolerating chip bag to R breast, purchased a prairie wear bra, pending new beata bra,  repeated MLD R UE and R breast, tolerated well.   No big change of R breast shape yet, softer overall.   Moving forward, can consider other clinic with addition of deep oscillation,  cupping or laser options if pt desires.      R UE overall doing well but slight increased circumference R upper arm only. No other skin changes.  Pt has obtained Juzo soft sleeve and gauntlet, will continue with garment for compression, will recheck in 6-7 weeks.       Educated pt to continue with shoulder ROM as able.      Plan:  Visit number: 6   Onset Date: 6/19/2024    Recheck in 6-7 weeks, remeasure R UE at that time, continue with R UE compression garment, continue with R shoulder ROM, continue with compression R breast (chip bag, beata bra.    Alternatively pt can try another location with laser, deep oscillation.  Cupping etc if she desires.        OP PT Plan  PT Plan: Skilled PT  PT Frequency: 1 time per week  Duration: 12 weeks  Onset Date: 06/19/24  Number of Treatments Authorized: anthem 30V, no auth  Rehab Potential: Good  Plan of Care Agreement: Patient    Current Problem  1. Lymphedema  Follow Up In Physical Therapy      2. Breast cancer metastasized to axillary lymph node, right (Multi)  Follow Up In Physical Therapy      3. S/P radiation therapy  Follow Up In  Physical Therapy          Subjective   Pt states she was tolerating her sleeve and gauntlet, bought a bra online (prairie wear), using it with her current bra,  not able to get bra options via [employer], will try another one later.    Seen by plastic surgeon, was thinking it was due to fibrosis vs lymphedema, was planning for surgery likely mastectomy and likely reconstruction.   Tolerating her sleeve and glove.      General  Reason for Referral: lymphedema  Referred By: Danny Chowdary MD  Past Medical History Relevant to Rehab: recent hx of R breast lumpectomy, ALND, radiation, chemo. currently on anastrazole.  Preferred Learning Style: visual, verbal, written  Precautions  Precautions  Precautions Comment: recent hx of R breast lumpectomy, ALND, radiation, chemo. currently on anastrazole.  Precautions/Fall Risk:fall risk no Pacemaker no  Seizures No  Post Op Movement/Restrictions No  Vital Signs     Pain     Current Pain Level (0-10): 0    HEP Compliance: Good    Objective   Circumference R/L 8/8/24---R 9/23/24:  thumb IP 5.3/5.2----5.7  thumb CMC 5.6/5.6---6.0  Palm 16.8/16.5----17.4  Wrist 14.9/14.3----14.9  5cm above wrist 18.8/17.3----18.5  10cm above wrist 21.8/20.0----21.7  15cm above wrist 24.4/22.0----24.6  Elbow 24.8/23.0----24.9  5cm above elbow 27.5/26.0----28.9  10cm above elbow 29.5/27.8----30.7  15cm above elbow 31.4/29.0----32.1  Axilla 32.3/30.8----33.2  Length 41.5     ROM: R/L  Shoulder flex   140deg      abd    130deg         IR 60deg            ER 75deg supine     STRENGTH: R/L WNL B UE  Shoulder:  Elbow:  Wrist     Sensation: WNL     Skin appearance/condition:   Significant enlargement R sup breast and full appearance R breast throughout, neg pitting, no longer painful with palpation, softer along R breast areola area, firm rest of breast tissue, slight darkening R sup breast.    Neg cording R axilla today  Neg skin changes R UE today.       LLIS 37%    Continue R UE garment with Juzo soft  sleeve 20-30mmHg size 2, juzo seamless gauntlet 20-30mmHg small.      R breast remains graf sup breast and with firm pointing shape   Skin slight indentation noted upon removal of chip bag as expected  Pt purchased a prairie wear bra, discussed further options of bra compression ( beata bra or bellisse with jovipak.),      No big change of R breast shape yet, softer overall.     Outcome Measures:       Treatments:     Therapeutic Procedure PT Therapeutic Procedures Time Entry  Manual Therapy Time Entry: 55 minutes     Manual Therapy 55 minutes  Measurement R UE  MLD neck, abdomen I and II  MLD B axilla and B trunk  MLD ant AA anastamosis  MLD R breast  MLD R UE    Activity:       OP EDUCATION:  Outpatient Education  Individual(s) Educated: Patient  Education Provided: Home Exercise Program, Lymphedema care, POC  Risk and Benefits Discussed with Patient/Caregiver/Other: yes  Patient/Caregiver Demonstrated Understanding: yes  Plan of Care Discussed and Agreed Upon: yes  Patient Response to Education: Patient/Caregiver Verbalized Understanding of Information    Goals:   To be met at time of discharge:  --Decrease girth measurement of UE/LE by 2cm or until plateau.  -- Improve skin/tissue R breast less firm, able to adapt with gravity.   -- Increase ROM R shoulder flex and abd to 130deg or better for ease with overhead activities.   -- Knowledgeable and compliant with home program, including lymphedema management and exercises.   -- Improve functional outcome on LLIS to <25%

## 2024-12-16 NOTE — PROGRESS NOTES
DIVISION OF MEDICAL ONCOLOGY    Patient ID: Yvrose John is a 59 y.o. female.  MRN: [id number]  : 1965  The patient presents to clinic today for her history of right breast cancer.     Cancer Staging   Breast cancer metastasized to axillary lymph node, right (Multi)  Staging form: Breast, AJCC 8th Edition  - Pathologic stage from 2022: No Stage Recommended - Signed by Danny Chowdary MD on 10/28/2023  pT1c  pN2a  cM0  [place] grade: G2  ER Status: Positive  [place] Status: Positive  HER2 Status: Positive    Diagnostic/Therapeutic History:  - Noted pain, rash right breast.  - 2022: Skin biopsy showed eosinophilic spongiotic dermatitis. Mammogram and ultrasound showed a 1.4 cm right breast mass with multiple abnormal right axillary LN's.  - 6/3/22: US-guided biopsy of the right breast mass showed IDC, grade 2-3, ER >95% [place] 40%, Her2+ (3+ IHC). An axillary LN was positive for metastatic carcinoma.  - 22: PET/CT did not showed any evidence of distant metastatic disease, but did show hypermetabolic activity in the 2.5 cm right breast mass and ALN's (cT2N1).  - Initiated on TCHP 22. She developed papilledema that was attributed to the carboplatin, so this was removed from cycle 2-6. Completed cycle #6 of THP on 10/17/22.  - HP initiated while awaiting surgery.  - Right lumpectomy/ALND performed on 22. 1.4 cm of residual grade 2 breast tumor noted with 4 LN's positive for carcinoma (2 macrometastases; 2 micrometastases). No GALI noted, but tumor deposits present in perinodal soft tissue. +LVI. Margins negative.  ypT1c ypN2a.  - Initial plan was for immediate reconstruction, but ultimately decided to purse adjuvant RT.  - Switched to adjuvant TDM1 23. Completed 14 cycles 10/2023.  - Adjuvant RT 23-3/16/23.  - 3/2023: Tamoxifen initiated, switched to anastrozole after a few months once postmenopausal status was confirmed.    History of Present Illness (HPI)/Interval History:  Yvrose  NAHUM John presents today for routine FUV on anastrozole.  Overall, she is doing well since her last visit.  She had been established with PT for her right breast lymphedema, but due to insurance issues, she was unable to continue therapy further. Has met with Dr. Liz and planning for surgical repair.   She has ongoing symptoms in the right breast- feels hard/heavy, tender at times.  No lymphedema in her arm.  She notes some hot flashes and joint pains, but these are manageable.    Review of Systems:  14-point ROS otherwise negative, as per HPI/Interval History.    Past Medical History:   Diagnosis Date    Breast cancer metastasized to axillary lymph node, right (Multi) 08/16/2023    Changes in skin texture 05/31/2022    Breast skin changes    Hx antineoplastic chemo     Personal history of irradiation     Personal history of other diseases of the circulatory system 09/05/2018    History of rheumatic fever    Personal history of other diseases of the circulatory system 09/05/2018    History of mitral valve prolapse    Personal history of other specified conditions 05/31/2022    History of lump of right breast     Patient Active Problem List   Diagnosis    Anemia    Breast cancer metastasized to axillary lymph node, right (Multi)    Chest pain on exertion    Contact dermatitis due to poison ivy    Hot flashes due to menopause    Thyroid fullness    Palpitations    Lymphedema    Hypokalemia    COVID-19    S/P radiation therapy    History of breast cancer    Pre-op exam    Exposure to other ionizing radiation, sequela    Breast deformity    Encounter to discuss breast reconstruction        Past Surgical History:   Procedure Laterality Date    BREAST LUMPECTOMY Right 11/30/2022    right lumpectomy w/ chemo and rad    DILATION AND CURETTAGE OF UTERUS  07/02/2013    Dilation And Curettage    HYSTEROSCOPY  07/02/2013    Hysteroscopy With Endometrial Ablation    IR CVC PORT REMOVAL  11/15/2023    IR CVC PORT REMOVAL  11/15/2023 Shawn Shahid MD [facility]    IR CVC PORT REMOVAL  11/15/2023    IR CVC PORT REMOVAL    OTHER SURGICAL HISTORY  2013    Laparoscopic Excision Left Fallopian Tube Cyst (___ Cm)    TOTAL ABDOMINAL HYSTERECTOMY  2013    Total Abdominal Hysterectomy    TUBAL LIGATION  2013    Tubal Ligation       Social History     Socioeconomic History    Marital status:    Tobacco Use    Smoking status: Former     Current packs/day: 0.00     Types: Cigarettes     Quit date:      Years since quittin.9     Passive exposure: Never    Smokeless tobacco: Never   Vaping Use    Vaping status: Never Used   Substance and Sexual Activity    Alcohol use: Not Currently     Alcohol/week: 2.0 standard drinks of alcohol     Types: 2 Glasses of wine per week     Comment: social    Drug use: Never     Social Drivers of Health     Financial Resource Strain: Low Risk  (2023)    Overall Financial Resource Strain (CARDIA)     Difficulty of Paying Living Expenses: Not very hard   Food Insecurity: No Food Insecurity (2023)    Received from [facility] beds at the [facility]    Hunger Vital Sign     Worried About Running Out of Food in the Last Year: Never true     Ran Out of Food in the Last Year: Never true   Transportation Needs: No Transportation Needs (2023)    PRAPARE - Transportation     Lack of Transportation (Medical): No     Lack of Transportation (Non-Medical): No   Physical Activity: Sufficiently Active (2023)    [facility] from [facility] beds at the [facility]    Exercise Vital Sign     Days of Exercise per Week: 7 days     Minutes of Exercise per Session: 30 min   Stress: No Stress Concern Present (2023)    [facility] from [facility] beds at the [facility]    [place] [employer] - Occupational Stress Questionnaire     Feeling of Stress : Only a little   Social Connections: Moderately Isolated (2023)    Received from [facility] beds at the [facility]     Social Connection and Isolation Panel [NHANES]     Frequency of Communication with Friends and Family: Three times a week     Frequency of Social Gatherings with Friends and Family: Once a week     Attends Holiness Services: Never     Active Member of Clubs or Organizations: No     Attends Club or Organization Meetings: Never     Marital Status:    Intimate Partner Violence: Not At Risk (2023)    Received from [facility], [facility]    Humiliation, Afraid, Rape, and Kick questionnaire     Fear of Current or Ex-Partner: No     Emotionally Abused: No     Physically Abused: No     Sexually Abused: No   Housing Stability: Low Risk  (2023)    Housing Stability Vital Sign     Unable to Pay for Housing in the Last Year: No     Number of Places Lived in the Last Year: 1     Unstable Housing in the Last Year: No       Family History   Problem Relation Name Age of Onset    Hypertension Mother      Diabetes type II Father      Prostate cancer Father      Breast cancer Sister         Allergies:   No Known Allergies      Current Outpatient Medications:     anastrozole (Arimidex) 1 mg tablet, Take 1 tablet (1 mg total) by mouth once daily., Disp: 90 tablet, Rfl: 3    arm brace (Elbow Compression Sleeve) misc, Lymphedema Sleeve  and Gauntlet eval and fit 20-30 mmHG for right arm, Disp: 1 each, Rfl: 0     Objective    BSA: There is no height or weight on file to calculate BSA.  There were no vitals taken for this visit.  Wt Readings from Last 3 Encounters:   24 89.8 kg (198 lb)   24 90.3 kg (199 lb)   24 89.1 kg (196 lb 6.9 oz)     ECOG Performance Status:  The ECOG performance scale today is ECO- Restricted in physically strenuous activity.  Carries out light duty.    Physical Exam  Vitals and nursing note reviewed.   Constitutional:       General: She is awake. She is not in acute distress.     Appearance: Normal appearance. She is not ill-appearing.   HENT:      Head:  Normocephalic and atraumatic.   Eyes:      General: No scleral icterus.        Right eye: No discharge.         Left eye: No discharge.   Neck:      Trachea: Trachea and phonation normal. No tracheal tenderness.   Cardiovascular:      Rate and Rhythm: Normal rate and regular rhythm.      Heart sounds: No murmur heard.     No friction rub. No gallop.   Pulmonary:      Effort: Pulmonary effort is normal. No respiratory distress.      Breath sounds: Normal breath sounds. No wheezing.   Chest:   Breasts:     Right: Swelling, skin change and tenderness present. No mass or nipple discharge.      Left: No swelling, mass, nipple discharge, skin change or tenderness.      Comments: Right breast lymphedema, fibrosis, and distortion in shape. Mild tenderness, no mass.  Musculoskeletal:         General: No tenderness or deformity. Normal range of motion.      Cervical back: Normal range of motion and neck supple. No rigidity or tenderness.      Right lower leg: No edema.      Left lower leg: No edema.   Lymphadenopathy:      Cervical: No cervical adenopathy.      Upper Body:      Right upper body: No supraclavicular or axillary adenopathy.      Left upper body: No supraclavicular adenopathy.   Skin:     General: Skin is warm and dry.      Coloration: Skin is not jaundiced.      Findings: No lesion or rash.   Neurological:      General: No focal deficit present.      Mental Status: She is alert and oriented to person, place, and time. Mental status is at baseline.      Gait: Gait normal.   Psychiatric:         Mood and Affect: Mood normal.         Behavior: Behavior normal.         Thought Content: Thought content normal.         Judgment: Judgment normal.         Laboratory Data:  Lab Results   Component Value Date    WBC 6.1 01/31/2024    HGB 14.2 01/31/2024    HCT 41.9 01/31/2024    MCV 93 01/31/2024     01/31/2024       Chemistry    Lab Results   Component Value Date/Time     01/31/2024 0952    K 3.3 (L)  01/31/2024 0952     01/31/2024 0952    CO2 26 01/31/2024 0952    BUN 9 01/31/2024 0952    CREATININE 0.55 01/31/2024 0952    Lab Results   Component Value Date/Time    CALCIUM 9.8 01/31/2024 0952    ALKPHOS 98 01/31/2024 0952    AST 32 01/31/2024 0952    ALT 22 01/31/2024 0952    BILITOT 0.9 01/31/2024 0952        Radiology:  === 05/18/24 ===    BI MR BREAST BILATERAL WITH CONTRAST FULL PROTOCOL    - Impression -  Posttreatment changes of the right breast. No MRI evidence of  malignancy in either breast.  Clinical follow-up is recommended.  Patient will be due for annual bilateral screening mammogram 06/2024.    BI-RADS CATEGORY:  BI-RADS Category:  2 Benign.  Recommendation:  Annual Screening.  Recommended Date:  1 Year.  Laterality:  Bilateral.    For any future breast imaging appointments, please call [phone]).      MACRO:  None    Signed by: Blessing Dewey 5/21/2024 12:29 PM  Dictation workstation:   [id number]      Assessment/Plan:  Yvrose John is a 59 y.o. female with a history of right ER+/Her2+ breast cancer, who presents today for follow-up evaluation on adjuvant anastrozole.    Breast cancer metastasized to axillary lymph node, right (CMS/HCC)  - Continue anastrozole 1 mg daily.  - Grade 1 hot flashes and arthralgias. Stable.  - MRI and mammogram recently without concerns for malignancy. Plan to proceed with yearly screening mammograms pending reconstructive surgery   - DEXA 7/2023 normal. Continue q2 year screening.    Lymphedema of the right breast.   - Remains significantly symptomatic.  - plans to proceed with a BULMARO flap in March // April 2024 with Dr. Fritz.  - RTC 6 months with Merlyn Rodriguez PA-C   - She has our contact information and was instructed to call with concerns/questions in the interim.    No orders of the defined types were placed in this encounter.     Merlyn Rodriguez PA-C  Hematology and Medical Oncology  [facility]

## 2024-12-18 ENCOUNTER — OFFICE VISIT (OUTPATIENT)
Dept: HEMATOLOGY/ONCOLOGY | Facility: CLINIC | Age: 59
End: 2024-12-18
Payer: COMMERCIAL

## 2024-12-18 VITALS
SYSTOLIC BLOOD PRESSURE: 142 MMHG | HEIGHT: 62 IN | BODY MASS INDEX: 37.08 KG/M2 | DIASTOLIC BLOOD PRESSURE: 79 MMHG | RESPIRATION RATE: 17 BRPM | OXYGEN SATURATION: 96 % | HEART RATE: 86 BPM | TEMPERATURE: 97.7 F | WEIGHT: 201.5 LBS

## 2024-12-18 DIAGNOSIS — C50.911 BREAST CANCER METASTASIZED TO AXILLARY LYMPH NODE, RIGHT (MULTI): ICD-10-CM

## 2024-12-18 DIAGNOSIS — C77.3 BREAST CANCER METASTASIZED TO AXILLARY LYMPH NODE, RIGHT (MULTI): ICD-10-CM

## 2024-12-18 PROCEDURE — 99214 OFFICE O/P EST MOD 30 MIN: CPT

## 2024-12-18 PROCEDURE — 3008F BODY MASS INDEX DOCD: CPT

## 2024-12-18 RX ORDER — ANASTROZOLE 1 MG/1
1 TABLET ORAL DAILY
Qty: 90 TABLET | Refills: 3 | Status: SHIPPED | OUTPATIENT
Start: 2024-12-18

## 2024-12-18 ASSESSMENT — PAIN SCALES - GENERAL: PAINLEVEL_OUTOF10: 0-NO PAIN

## 2024-12-18 NOTE — PROGRESS NOTES
Patient here today for follow up. She has no new medical issues or symptoms today.     Fatigue: getting better  PO intake: adequate  Weight: stable  N/V/D/C: denies    Medications and pharmacy preference reviewed with patient.

## 2025-01-13 ENCOUNTER — TELEPHONE (OUTPATIENT)
Dept: CARDIAC SURGERY | Facility: HOSPITAL | Age: 60
End: 2025-01-13
Payer: COMMERCIAL

## 2025-01-17 ENCOUNTER — TELEPHONE (OUTPATIENT)
Dept: CARDIAC SURGERY | Facility: HOSPITAL | Age: 60
End: 2025-01-17
Payer: COMMERCIAL

## 2025-01-21 NOTE — PROGRESS NOTES
BREAST SURGICAL ONCOLOGY FOLLOW UP     Phillip John is a 59 y.o. female presents today for follow up carcinoma of the right breast.   She has severe radiation-induced skin changes at the right breast which appears severely edematous, and deformed into cone shape. The breast skin especially around the nipple-areola complex has distinct orange peel appearance. Patient stated that the breast is not painful, but it becomes painful with pressure.   She has met with Dr. Liz and is considering right mastectomy with BULMARO flap reconstruction.     Treatment history:  - Noted pain, rash right breast.  - 5/2022: Skin biopsy showed eosinophilic spongiotic dermatitis. Mammogram and ultrasound showed a 1.4 cm right breast mass with multiple abnormal right axillary LN's.  - 6/3/22: US-guided biopsy of the right breast mass showed IDC, grade 2-3, ER >95% KS 40%, Her2+ (3+ IHC). An axillary LN was positive for metastatic carcinoma.  - 7/5/22: PET/CT did not showed any evidence of distant metastatic disease, but did show hypermetabolic activity in the 2.5 cm right breast mass and ALN's (cT2N1).  - Initiated on TCHP 7/1/22. She developed papilledema that was attributed to the carboplatin, so this was removed from cycle 2-6. Completed cycle #6 of THP on 10/17/22.  - HP initiated while awaiting surgery.  - Right lumpectomy/ALND performed on 11/30/22. 1.4 cm of residual grade 2 breast tumor noted with 4 LN's positive for carcinoma (2 macrometastases; 2 micrometastases). No GALI noted, but tumor deposits present in perinodal soft tissue. +LVI. Margins negative.  ypT1c ypN2a.  - Initial plan was for immediate reconstruction, but ultimately decided to purse adjuvant RT.  - Switched to adjuvant TDM1 1/24/23. Completed 14 cycles 10/2023.  - Adjuvant RT 2/6/23-3/16/23.  - 3/2023: Tamoxifen initiated, switched to anastrozole after a few months once postmenopausal status was confirmed.      Surgery: 11/30/22: Right  lumpectomy and axillary lymph node dissection  Radiation: 2/6/23-3/16/23  Systemic therapy: 7/5/22- 10/17/22: Neoadjuvant TCHP; 1/24: TDM1; 3/2023: tamoxifen/anastrozole    Mammogram: 6/6/24: Posttreatment changes in the right breast.  No evidence of malignancy bilaterally.  Category 2.    Radiology review: All images and reports were personally reviewed and the findings discussed with the patient.     Review of Systems   Constitutional symptoms: Denies generalized fatigue.  Denies weight change, fevers/chills, difficulty sleeping   Eyes: Denies double vision, glaucoma, cataracts.  Ear/nose/throat/mouth: Denies hearing changes, sore throat, sinus problems.  Cardiovascular: No chest pain.  Denies irregular heartbeat.  Denies ankle swelling.  Respiratory: No wheezing, cough, or shortness of breath.  Gastrointestinal: No abdominal pain,  No nausea/vomiting.  No indigestion/heartburn.  No change in bowel habits.  No constipation or diarrhea.   Genitourinary: No urinary incontinence.  No urinary frequency.  No painful urination.  Musculoskeletal: No bone pain, no muscle pain, no joint pain.   Integumentary: No rash. No masses.  No changes in moles.  No easy bruising.  Neurological: No headaches.  No tremors. No numbness/tingling.  Psychiatric: No anxiety. No depression.  Endocrine: No excessive thirst.  Not too hot or too cold.  Not tired or fatigued.    Hematological/lymphatic: No swollen glands or blood clotting problems.  No bruising.    PHYSICAL EXAM:    General: Alert and oriented x 3.  Mood and affect are appropriate.  Neck: supple, no masses, no cervical adenopathy.  Cardiovascular: no lower extremity edema.  Pulmonary: breathing non labored on room air.  GI: Abdomen soft, no masses. No hepatomegaly or splenomegaly.  Lymph nodes: No supraclavicular or axillary adenopathy bilaterally.  Musculoskeletal: Full range of motion in the upper extremities bilaterally.  Neuro: denies dizziness, tremors  Physical  Exam  Chest:      Comments: There is no cervical, supraclavicular, or axillary lymphadenopathy.  The breasts are symmetric bilaterally. In the left breast, there are no dominant masses, no skin changes, and no nipple discharge. In the right breast, the skin is taut.  The breast is extremely firm and conical in shape.  There is firmness in the upper chest as well.  No palpable masses.  No upper extremity lymphedema.  No peau d'orange.          Assessment/Plan   Right breast cancer diagnosed in June 2022.  Invasive ductal carcinoma, grade 2-3; ER greater than 95%, FL 40%, HER2 positive.  Clinical stage II A (cT1c N1)  -ypT1c N2a following neoadjuvant chemotherapy    Clinical breast exam shows no palpable masses.  There is no evidence of cancer recurrence.    The patient is considering completion mastectomy with BULMARO flap reconstruction by Dr. Liz.  I have discussed proceeding with a completion mastectomy.  No lymph nodes will be removed.  The risk, benefits and procedures have been reviewed including the risks of bleeding, infection, scar tissue formation, numbness across the chest, postop pain and lymphedema.  Pain management and return to activities will be dictated by Dr. Liz.    I have discussed with the patient the pathophysiology of the disease process and the rationale for the planned surgery. I have explained the anticipated risks and possible complications, the incidences and consequences of those risks and complications, and the expected postoperative course. Alternatives have been discussed including the alternative of no surgery. The patient has been given the opportunity to ask questions and all her questions have been answered to her satisfaction.  Individual shared decision making was implemented.    Surgery has been recommended. The risks, benefits and procedure have been reviewed with you today.   You may be scheduled for pre-surgical testing and detailed instructions will be given to you at  that appointment.  The pathology results from your surgery should be available about 14 days after the procedure. I will call you with the results. If you do not hear from me within 21 days, please call the office at [phone] for your results.    Schedule right simple mastectomy with BULMARO flap reconstruction.  Will coordinate with Dr. Liz's office to find a surgical date.    Regi Maldonado MD

## 2025-01-28 ENCOUNTER — OFFICE VISIT (OUTPATIENT)
Dept: SURGICAL ONCOLOGY | Facility: CLINIC | Age: 60
End: 2025-01-28
Payer: COMMERCIAL

## 2025-01-28 VITALS
TEMPERATURE: 97.9 F | SYSTOLIC BLOOD PRESSURE: 153 MMHG | BODY MASS INDEX: 37.52 KG/M2 | DIASTOLIC BLOOD PRESSURE: 94 MMHG | RESPIRATION RATE: 16 BRPM | HEART RATE: 85 BPM | HEIGHT: 62 IN | WEIGHT: 203.9 LBS

## 2025-01-28 DIAGNOSIS — C77.3 BREAST CANCER METASTASIZED TO AXILLARY LYMPH NODE, RIGHT (MULTI): Primary | ICD-10-CM

## 2025-01-28 DIAGNOSIS — C50.911 BREAST CANCER METASTASIZED TO AXILLARY LYMPH NODE, RIGHT (MULTI): Primary | ICD-10-CM

## 2025-01-28 PROCEDURE — 3008F BODY MASS INDEX DOCD: CPT | Performed by: SURGERY

## 2025-01-28 PROCEDURE — 99215 OFFICE O/P EST HI 40 MIN: CPT | Performed by: SURGERY

## 2025-01-28 ASSESSMENT — PAIN SCALES - GENERAL: PAINLEVEL_OUTOF10: 0-NO PAIN

## 2025-01-31 NOTE — PROGRESS NOTES
Hi     DOS: 6/6/25 - Will she need to be seen again before surgery date?     Thanks,  YJACQUELINE

## 2025-02-03 DIAGNOSIS — N64.4 BREAST PAIN: ICD-10-CM

## 2025-02-03 DIAGNOSIS — I89.0 LYMPHEDEMA OF BREAST: Primary | ICD-10-CM

## 2025-02-03 DIAGNOSIS — N64.89 ACQUIRED BREAST DEFORMITY: ICD-10-CM

## 2025-02-04 PROBLEM — I89.0 LYMPHEDEMA OF BREAST: Status: ACTIVE | Noted: 2025-02-03

## 2025-02-04 PROBLEM — N64.4 BREAST PAIN: Status: ACTIVE | Noted: 2025-02-03

## 2025-02-04 PROBLEM — N64.89 ACQUIRED BREAST DEFORMITY: Status: ACTIVE | Noted: 2025-02-03

## 2025-02-05 DIAGNOSIS — C77.3 BREAST CANCER METASTASIZED TO AXILLARY LYMPH NODE, RIGHT (MULTI): ICD-10-CM

## 2025-02-05 DIAGNOSIS — C50.911 BREAST CANCER METASTASIZED TO AXILLARY LYMPH NODE, RIGHT (MULTI): ICD-10-CM

## 2025-03-11 ENCOUNTER — TELEPHONE (OUTPATIENT)
Dept: CARDIAC SURGERY | Facility: HOSPITAL | Age: 60
End: 2025-03-11
Payer: COMMERCIAL

## 2025-03-12 ENCOUNTER — TELEPHONE (OUTPATIENT)
Dept: CARDIAC SURGERY | Facility: HOSPITAL | Age: 60
End: 2025-03-12
Payer: COMMERCIAL

## 2025-03-14 ENCOUNTER — TELEPHONE (OUTPATIENT)
Dept: SURGICAL ONCOLOGY | Facility: CLINIC | Age: 60
End: 2025-03-14
Payer: COMMERCIAL

## 2025-03-14 NOTE — TELEPHONE ENCOUNTER
Called pt per admin assist request. Pt has questions regard to follow-up with Dr Maldonado. Encouraged to call back at number provided.

## 2025-04-15 ENCOUNTER — PATIENT MESSAGE (OUTPATIENT)
Dept: HEMATOLOGY/ONCOLOGY | Facility: CLINIC | Age: 60
End: 2025-04-15
Payer: COMMERCIAL

## 2025-04-15 DIAGNOSIS — M54.50 ACUTE MIDLINE LOW BACK PAIN WITHOUT SCIATICA: ICD-10-CM

## 2025-04-15 DIAGNOSIS — R07.81 RIB PAIN: ICD-10-CM

## 2025-04-15 DIAGNOSIS — C50.911 BREAST CANCER METASTASIZED TO AXILLARY LYMPH NODE, RIGHT: Primary | ICD-10-CM

## 2025-04-15 DIAGNOSIS — C77.3 BREAST CANCER METASTASIZED TO AXILLARY LYMPH NODE, RIGHT: Primary | ICD-10-CM

## 2025-04-16 ENCOUNTER — LAB (OUTPATIENT)
Dept: LAB | Facility: HOSPITAL | Age: 60
End: 2025-04-16
Payer: COMMERCIAL

## 2025-04-16 ENCOUNTER — HOSPITAL ENCOUNTER (OUTPATIENT)
Dept: RADIOLOGY | Facility: HOSPITAL | Age: 60
Discharge: HOME | End: 2025-04-16
Payer: COMMERCIAL

## 2025-04-16 DIAGNOSIS — C77.3 SECONDARY AND UNSPECIFIED MALIGNANT NEOPLASM OF AXILLA AND UPPER LIMB LYMPH NODES: ICD-10-CM

## 2025-04-16 DIAGNOSIS — M54.50 ACUTE MIDLINE LOW BACK PAIN WITHOUT SCIATICA: ICD-10-CM

## 2025-04-16 DIAGNOSIS — M54.50 LOW BACK PAIN, UNSPECIFIED: ICD-10-CM

## 2025-04-16 DIAGNOSIS — R07.81 RIB PAIN: ICD-10-CM

## 2025-04-16 DIAGNOSIS — C77.3 BREAST CANCER METASTASIZED TO AXILLARY LYMPH NODE, RIGHT: ICD-10-CM

## 2025-04-16 DIAGNOSIS — C50.911 MALIGNANT NEOPLASM OF UNSPECIFIED SITE OF RIGHT FEMALE BREAST: Primary | ICD-10-CM

## 2025-04-16 DIAGNOSIS — R07.81 PLEURODYNIA: ICD-10-CM

## 2025-04-16 DIAGNOSIS — C50.911 BREAST CANCER METASTASIZED TO AXILLARY LYMPH NODE, RIGHT: ICD-10-CM

## 2025-04-16 LAB
ALBUMIN SERPL BCP-MCNC: 4.4 G/DL (ref 3.4–5)
ALP SERPL-CCNC: 89 U/L (ref 33–110)
ALT SERPL W P-5'-P-CCNC: 45 U/L (ref 7–45)
ANION GAP SERPL CALC-SCNC: 18 MMOL/L (ref 10–20)
AST SERPL W P-5'-P-CCNC: 42 U/L (ref 9–39)
BASOPHILS # BLD AUTO: 0.01 X10*3/UL (ref 0–0.1)
BASOPHILS NFR BLD AUTO: 0.2 %
BILIRUB SERPL-MCNC: 1 MG/DL (ref 0–1.2)
BUN SERPL-MCNC: 21 MG/DL (ref 6–23)
CALCIUM SERPL-MCNC: 9.1 MG/DL (ref 8.6–10.3)
CHLORIDE SERPL-SCNC: 97 MMOL/L (ref 98–107)
CO2 SERPL-SCNC: 22 MMOL/L (ref 21–32)
CREAT SERPL-MCNC: 1.07 MG/DL (ref 0.5–1.05)
CREAT SERPL-MCNC: 1.44 MG/DL (ref 0.6–1.3)
EGFRCR SERPLBLD CKD-EPI 2021: 60 ML/MIN/1.73M*2
EOSINOPHIL # BLD AUTO: 0.04 X10*3/UL (ref 0–0.7)
EOSINOPHIL NFR BLD AUTO: 0.7 %
ERYTHROCYTE [DISTWIDTH] IN BLOOD BY AUTOMATED COUNT: 13.1 % (ref 11.5–14.5)
GFR SERPL CREATININE-BSD FRML MDRD: 42 ML/MIN/1.73M*2
GLUCOSE SERPL-MCNC: 86 MG/DL (ref 74–99)
HCT VFR BLD AUTO: 46 % (ref 36–46)
HGB BLD-MCNC: 16.1 G/DL (ref 12–16)
IMM GRANULOCYTES # BLD AUTO: 0.01 X10*3/UL (ref 0–0.7)
IMM GRANULOCYTES NFR BLD AUTO: 0.2 % (ref 0–0.9)
LYMPHOCYTES # BLD AUTO: 0.88 X10*3/UL (ref 1.2–4.8)
LYMPHOCYTES NFR BLD AUTO: 14.6 %
MCH RBC QN AUTO: 33.1 PG (ref 26–34)
MCHC RBC AUTO-ENTMCNC: 35 G/DL (ref 32–36)
MCV RBC AUTO: 95 FL (ref 80–100)
MONOCYTES # BLD AUTO: 0.81 X10*3/UL (ref 0.1–1)
MONOCYTES NFR BLD AUTO: 13.4 %
NEUTROPHILS # BLD AUTO: 4.29 X10*3/UL (ref 1.2–7.7)
NEUTROPHILS NFR BLD AUTO: 70.9 %
NRBC BLD-RTO: 0 /100 WBCS (ref 0–0)
PLATELET # BLD AUTO: 162 X10*3/UL (ref 150–450)
POTASSIUM SERPL-SCNC: 3.2 MMOL/L (ref 3.5–5.3)
PROT SERPL-MCNC: 6.5 G/DL (ref 6.4–8.2)
RBC # BLD AUTO: 4.87 X10*6/UL (ref 4–5.2)
SODIUM SERPL-SCNC: 134 MMOL/L (ref 136–145)
WBC # BLD AUTO: 6 X10*3/UL (ref 4.4–11.3)

## 2025-04-16 PROCEDURE — 74177 CT ABD & PELVIS W/CONTRAST: CPT

## 2025-04-16 PROCEDURE — 2550000001 HC RX 255 CONTRASTS

## 2025-04-16 PROCEDURE — 82565 ASSAY OF CREATININE: CPT

## 2025-04-16 RX ADMIN — IOHEXOL 75 ML: 350 INJECTION, SOLUTION INTRAVENOUS at 13:39

## 2025-05-05 ENCOUNTER — APPOINTMENT (OUTPATIENT)
Dept: PLASTIC SURGERY | Facility: CLINIC | Age: 60
End: 2025-05-05
Payer: COMMERCIAL

## 2025-05-06 ENCOUNTER — PREP FOR PROCEDURE (OUTPATIENT)
Dept: SURGICAL ONCOLOGY | Facility: CLINIC | Age: 60
End: 2025-05-06
Payer: COMMERCIAL

## 2025-05-06 DIAGNOSIS — C50.911 MALIGNANT NEOPLASM OF RIGHT FEMALE BREAST, UNSPECIFIED ESTROGEN RECEPTOR STATUS, UNSPECIFIED SITE OF BREAST: Primary | ICD-10-CM

## 2025-05-06 NOTE — H&P (VIEW-ONLY)
Subjective :  Patient ID: Yvrose John is a 60 y.o. female.    History of Present Illness: History of right breast cancer, T2 N1 M0 invasive ductal carcinoma, metastasized to right axillary lymph node. Right lumpectomy with removal of 18 lymph nodes 11/30/22 with Dr. Maldonado. She had 29 radiation treatments completed March 2023 and 20 chemo treatments completed October 2023. She has been established with PT for her right breast lymphedema but has stopped treatments due to lack of insurance coverage. She has ongoing symptoms in the right breast- feels hard/heavy, tender at times and also endorses some cramping sensation/pain on the lateral aspect of the breast. No clinical lymphedema in her arm. She is unhappy with the asymmetry of her breast and associated symptoms as above. She presents today to discuss plan of care prior to her surgery scheduled on 6/6/25 with Dr. Maldonado and Dr. Liz. She is taking anastrozole daily.    Review of Systems  ROS: All 10 systems were reviewed and are unremarkable except for those mentioned in HPI.     Objective :  Physical Exam  Vitals and nursing note reviewed. Exam conducted with a chaperone present.   Constitutional:       General: She is not in acute distress.     Appearance: She is not ill-appearing.   Eyes:      Extraocular Movements: Extraocular movements intact.      Conjunctiva/sclera: Conjunctivae normal.      Pupils: Pupils are equal, round, and reactive to light.   Cardiovascular:      Rate and Rhythm: Normal rate and regular rhythm.      Pulses: Normal pulses.   Pulmonary:      Effort: Pulmonary effort is normal.      Breath sounds: Normal breath sounds.   Abdominal:      Palpations: Abdomen is soft. There is no mass.      Tenderness: There is no abdominal tenderness.      Hernia: No hernia is present.   Musculoskeletal:         General: No swelling or tenderness.      Cervical back: Normal range of motion and neck supple.   Skin:     Capillary Refill: Capillary  refill takes less than 2 seconds.      Coloration: Skin is not jaundiced.      Findings: No bruising or rash.   Neurological:      General: No focal deficit present.      Mental Status: She is oriented to person, place, and time.   Psychiatric:         Mood and Affect: Mood normal.         Behavior: Behavior normal.         Thought Content: Thought content normal.         Judgment: Judgment normal.     Detailed Breast Exam:  Right Breast appears fibrotic and has orange peel skin. It is edematous and cone shaped. No masses.   Left breast: hypertrophic with grade III ptosis. No masses.   Right upper extremity lymphedema was not appreciated clinically.     Assessment/Plan :    Patient returns to clinic today  to discuss upcoming surgery. She presents with severe radiation-induced skin changes at the right breast which appears severely edematous and deformed into cone shape. The breast skin especially around the nipple-areola complex has distinct orange peel appearance. Patient stated that the breast is not painful at rest, but it becomes painful when pressured, and it often feels hard/heavy, and occasionally cramps/spasms. She was previously told that she has right upper extremity lymphedema but without significant clinical presentation. Patient seeks treatment for the right breast, and we discussed completion mastectomy and autologous breast reconstruction via BULMARO flap.      We reviewed patient's CT scan and noted the presence of adequate DIEA perforators for BULMARO flap, and diastasis recti of 4.7 cm, and chronic findings such as liver and kidney cysts which were discussed with patient.     I reviewed with patient autologous breast reconstruction with BULMARO flap in detail, plication of abdominal wall fascia, and we reviewed perioperative course and recovery. We reviewed possible risks of reaction to medication, deep vein thrombosis and pulmonary embolism, infection, wound healing issues, seroma, bleeding and hematoma,  partial or total flap necrosis, abdominal wall weakness, hernia or bulge, wound healing issues, asymmetry, and the need for additional surgeries. Discussed immediate post-op course in detail including hospital stay, drain and incisional VAC care, time off from work, and the long-term recovery course including eventual revision(s) and contralateral breast reduction/lift for symmetry.    Patient expressed good understanding and would like to proceed with surgery.     Plan:   - Discontinue anastrozole pre-operatively starting today, will need to be held until at least two weeks post-operatively or until complete wound healing, whatever comes first.   - YESSICA Drain care handout printed for patient and reviewed with patient by RN  - Will coordinate pre-op photos and Vectra scanning with our department   - Patient verbalized good understanding and would like to proceed: OR scheduled 6/6/25 at [facility] [place]  -Preadmission testing on 5/23

## 2025-05-06 NOTE — PROGRESS NOTES
Subjective :  Patient ID: Yvrose John is a 60 y.o. female.    History of Present Illness: History of right breast cancer, T2 N1 M0 invasive ductal carcinoma, metastasized to right axillary lymph node. Right lumpectomy with removal of 18 lymph nodes 11/30/22 with Dr. Maldonado. She had 29 radiation treatments completed March 2023 and 20 chemo treatments completed October 2023. She has been established with PT for her right breast lymphedema but has stopped treatments due to lack of insurance coverage. She has ongoing symptoms in the right breast- feels hard/heavy, tender at times and also endorses some cramping sensation/pain on the lateral aspect of the breast. No clinical lymphedema in her arm. She is unhappy with the asymmetry of her breast and associated symptoms as above. She presents today to discuss plan of care prior to her surgery scheduled on 6/6/25 with Dr. Maldonado and Dr. Liz. She is taking anastrozole daily.    Review of Systems  ROS: All 10 systems were reviewed and are unremarkable except for those mentioned in HPI.     Objective :  Physical Exam  Vitals and nursing note reviewed. Exam conducted with a chaperone present.   Constitutional:       General: She is not in acute distress.     Appearance: She is not ill-appearing.   Eyes:      Extraocular Movements: Extraocular movements intact.      Conjunctiva/sclera: Conjunctivae normal.      Pupils: Pupils are equal, round, and reactive to light.   Cardiovascular:      Rate and Rhythm: Normal rate and regular rhythm.      Pulses: Normal pulses.   Pulmonary:      Effort: Pulmonary effort is normal.      Breath sounds: Normal breath sounds.   Abdominal:      Palpations: Abdomen is soft. There is no mass.      Tenderness: There is no abdominal tenderness.      Hernia: No hernia is present.   Musculoskeletal:         General: No swelling or tenderness.      Cervical back: Normal range of motion and neck supple.   Skin:     Capillary Refill: Capillary  refill takes less than 2 seconds.      Coloration: Skin is not jaundiced.      Findings: No bruising or rash.   Neurological:      General: No focal deficit present.      Mental Status: She is oriented to person, place, and time.   Psychiatric:         Mood and Affect: Mood normal.         Behavior: Behavior normal.         Thought Content: Thought content normal.         Judgment: Judgment normal.     Detailed Breast Exam:  Right Breast appears fibrotic and has orange peel skin. It is edematous and cone shaped. No masses.   Left breast: hypertrophic with grade III ptosis. No masses.   Right upper extremity lymphedema was not appreciated clinically.     Assessment/Plan :    Patient returns to clinic today  to discuss upcoming surgery. She presents with severe radiation-induced skin changes at the right breast which appears severely edematous and deformed into cone shape. The breast skin especially around the nipple-areola complex has distinct orange peel appearance. Patient stated that the breast is not painful at rest, but it becomes painful when pressured, and it often feels hard/heavy, and occasionally cramps/spasms. She was previously told that she has right upper extremity lymphedema but without significant clinical presentation. Patient seeks treatment for the right breast, and we discussed completion mastectomy and autologous breast reconstruction via BULMARO flap.      We reviewed patient's CT scan and noted the presence of adequate DIEA perforators for BULMARO flap, and diastasis recti of 4.7 cm, and chronic findings such as liver and kidney cysts which were discussed with patient.     I reviewed with patient autologous breast reconstruction with BULMARO flap in detail, plication of abdominal wall fascia, and we reviewed perioperative course and recovery. We reviewed possible risks of reaction to medication, deep vein thrombosis and pulmonary embolism, infection, wound healing issues, seroma, bleeding and hematoma,  partial or total flap necrosis, abdominal wall weakness, hernia or bulge, wound healing issues, asymmetry, and the need for additional surgeries. Discussed immediate post-op course in detail including hospital stay, drain and incisional VAC care, time off from work, and the long-term recovery course including eventual revision(s) and contralateral breast reduction/lift for symmetry.    Patient expressed good understanding and would like to proceed with surgery.     Plan:   - Discontinue anastrozole pre-operatively starting today, will need to be held until at least two weeks post-operatively or until complete wound healing, whatever comes first.   - YESSICA Drain care handout printed for patient and reviewed with patient by RN  - Will coordinate pre-op photos and Vectra scanning with our department   - Patient verbalized good understanding and would like to proceed: [place] [facility] [place]read[place] testing on 5/23

## 2025-05-14 ENCOUNTER — TELEPHONE (OUTPATIENT)
Dept: PLASTIC SURGERY | Facility: CLINIC | Age: 60
End: 2025-05-14
Payer: COMMERCIAL

## 2025-05-14 DIAGNOSIS — C50.911 MALIGNANT NEOPLASM OF RIGHT FEMALE BREAST, UNSPECIFIED ESTROGEN RECEPTOR STATUS, UNSPECIFIED SITE OF BREAST: ICD-10-CM

## 2025-05-15 ENCOUNTER — CLINICAL SUPPORT (OUTPATIENT)
Dept: PREADMISSION TESTING | Facility: HOSPITAL | Age: 60
End: 2025-05-15
Payer: COMMERCIAL

## 2025-05-15 NOTE — CPM/PAT H&P
CPM/PAT Evaluation       Name: Yvrose John (Yvrose John)  /Age: 1965/60 y.o.     { PAT Visit Type:28122}      Chief Complaint: ***    HPI  Yvrose John is scheduled for RECONSTRUCTION, BREAST, USING FREE FLAP - Right  MASTECTOMY, UNILATERAL, SIMPLE - Right with Dr. Liz on 25.  Medical History[1]    Surgical History[2]    Patient  has no history on file for sexual activity.    Family History[3]    Allergies[4]    Prior to Admission medications    Medication Sig Start Date End Date Taking? Authorizing Provider   anastrozole (Arimidex) 1 mg tablet Take 1 tablet (1 mg total) by mouth once daily. 24   Merlyn Rodriguez PA-C   arm brace (Elbow Compression Sleeve) misc Lymphedema Sleeve  and Gauntlet eval and fit 20-30 mmHG for right arm 24   Merlyn Rodriguez PA-C        PAT ROS     PAT Physical Exam     Airway        Visit Vitals  OB Status Postmenopausal   Smoking Status Former       DASI Risk Score    No data to display       Caprini DVT Assessment      Flowsheet Row ED to Hosp-Admission (Discharged) from 2023 in [facility] with Isaac Mo MD and Cristin Santos, DO   DVT Score (IF A SCORE IS NOT CALCULATING, MUST SELECT A BMI TO COMPLETE) 6 filed at 2023 1654   BMI (BMI MUST BE CHOSEN) 31-40 (Obesity) filed at 2023 1654   RETIRED: History Previous malignancy filed at 2023 1654   RETIRED: Age 40-59 years filed at 2023 1654          Modified Frailty Index    No data to display       [id number] Stroke Risk Points  Current as of just now        N/A 0 to 9 Points:      Last Change: N/A          The [id number] risk score (Lip ZECHARIAH, et al. 2009. © 2010 [employer]) quantifies the risk of stroke for a patient with atrial fibrillation. For patients without atrial fibrillation or under the age of 18 this score appears as N/A. Higher score values generally indicate higher risk of stroke.         This score is not applicable to this patient. Components are not calculated.          Revised Cardiac Risk Index    No data to display       Apfel Simplified Score    No data to display       Risk Analysis Index Results This Encounter    No data found in the last 10 encounters.       Prodigy: High Risk  Total Score: 0          ARISCAT Score for Postoperative Pulmonary Complications    No data to display       Regan Perioperative Risk for Myocardial Infarction or Cardiac Arrest (ADAM)    No data to display     Testing/Diagnostic:   CT CHEST ABDOMEN PELVIS W IV CONTRAST; 4/16/2025   IMPRESSION:   Right upper lobe area of volume loss and consolidation appears   decreased compared to prior CT but there is still a subsegmental area   of collapse with bronchial crowding       No evidence of metastatic disease is identified in the chest, abdomen   or pelvis     CT ANGIO CHEST FOR PULMONARY EMBOLISM; 11/16/2023   IMPRESSION:  1.  No evidence of pulmonary embolus.  2. Interval presumed post treatment changes in geographic  distribution of the right upper lobe is infiltration of the right  anterior chest wall.  3. No new or enlarging intrathoracic mass.  4. Remaining findings appear stable.       TRANSTHORACIC ECHO; 10/4/23  CONCLUSIONS:   1. Left ventricular systolic function is normal with a 65% estimated ejection fraction.   2. Spectral Doppler shows an impaired relaxation pattern of left ventricular diastolic filling.   3. Strain values are normal, which imply normal myocardial function.      TRANSTHORACIC ECHO; 6/29/23  CONCLUSIONS:  1. Left ventricular systolic function is normal with a 65-70% estimated ejection fraction.  2. Spectral Doppler shows an impaired relaxation pattern of left ventricular diastolic filling.  3. Strain values are normal, which imply normal myocardial function.    Patient Specialist/PCP:   Surgical Oncology: Regi Maldonado MD 1/28/25 presents today for follow up carcinoma of the right breast.    She has severe radiation-induced skin changes at the right breast which appears severely edematous, and deformed into cone shape. The breast skin especially around the nipple-areola complex has distinct orange peel appearance. Patient stated that the breast is not painful, but it becomes painful with pressure.   She has met with Dr. Liz and is considering right mastectomy with BULMARO flap reconstruction.     Oncology: Merlyn Rodriguez PA-C 12/18/24  The patient presents to clinic today for her history of right breast cancer.     Plastic Surgery: Cornelius Liz MD 9/16/24 Patient presents with severe radiation-induced skin changes at the right breast which appears severely edematous, and deformed into cone shape. The breast skin especially around the nipple-areola complex has distinct orange peel appearance. Patient stated that the breast is not painful, but it becomes painful when pressured. She additionally was told that she has right upper extremity lymphedema but without significant clinical presentation. Patient seeks treatment for the right breast.     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 Her symptoms suggest possible arrhythmia triggered by exertion and likely also related to hormonal fluctuations. At this time she prefers to proceed with increasing hydration and monitoring her symptoms. If she decides to proceed with further testing she will notify us. Recommend watchful waiting     PCP: Garfield Pal DO 6/13/2023 at [facility] presents for Annual Exam.    ONLY    Savannah Navarrete RN  Pre-Admission Testing   Assessment and Plan:     {[facility] ASSESSMENT AND PLAN:67093}             [1]   Past Medical History:  Diagnosis Date    Breast cancer 06/2022    Changes in skin texture 05/31/2022    Breast skin changes    History of mitral valve prolapse     History of rheumatic fever     Hx antineoplastic chemo      Hypertension     Personal history of irradiation     Personal history of other specified conditions 05/31/2022    History of lump of right breast    Rheumatic fever    [2]   Past Surgical History:  Procedure Laterality Date    BREAST LUMPECTOMY Right 11/30/2022    right lumpectomy w/ chemo and rad    DILATION AND CURETTAGE OF UTERUS  07/02/2013    Dilation And Curettage    HYSTEROSCOPY  07/02/2013    Hysteroscopy With Endometrial Ablation    IR CVC PORT REMOVAL  11/15/2023    IR CVC PORT REMOVAL 11/15/2023 Shawn Shahid MD AD CVEPINV    IR CVC PORT REMOVAL  11/15/2023    IR CVC PORT REMOVAL    OTHER SURGICAL HISTORY  07/12/2013    Laparoscopic Excision Left Fallopian Tube Cyst (___ Cm)    TOTAL ABDOMINAL HYSTERECTOMY  07/12/2013    Total Abdominal Hysterectomy    TUBAL LIGATION  07/02/2013    Tubal Ligation   [3]   Family History  Problem Relation Name Age of Onset    Hypertension Mother      Diabetes type II Father      Prostate cancer Father      Breast cancer Sister     [4] No Known Allergies

## 2025-05-16 ENCOUNTER — HOSPITAL ENCOUNTER (OUTPATIENT)
Dept: RADIATION ONCOLOGY | Facility: CLINIC | Age: 60
Setting detail: RADIATION/ONCOLOGY SERIES
Discharge: HOME | End: 2025-05-16
Payer: COMMERCIAL

## 2025-05-16 VITALS
SYSTOLIC BLOOD PRESSURE: 160 MMHG | WEIGHT: 200.4 LBS | HEART RATE: 73 BPM | RESPIRATION RATE: 16 BRPM | DIASTOLIC BLOOD PRESSURE: 88 MMHG | BODY MASS INDEX: 36.92 KG/M2 | TEMPERATURE: 98.4 F | OXYGEN SATURATION: 96 %

## 2025-05-16 DIAGNOSIS — I89.0 LYMPHEDEMA: Primary | ICD-10-CM

## 2025-05-16 DIAGNOSIS — Z17.0 MALIGNANT NEOPLASM OF RIGHT BREAST IN FEMALE, ESTROGEN RECEPTOR POSITIVE, UNSPECIFIED SITE OF BREAST: ICD-10-CM

## 2025-05-16 DIAGNOSIS — C77.3 BREAST CANCER METASTASIZED TO AXILLARY LYMPH NODE, RIGHT: ICD-10-CM

## 2025-05-16 DIAGNOSIS — Z79.811 AROMATASE INHIBITOR USE: ICD-10-CM

## 2025-05-16 DIAGNOSIS — C50.911 BREAST CANCER METASTASIZED TO AXILLARY LYMPH NODE, RIGHT: ICD-10-CM

## 2025-05-16 DIAGNOSIS — C50.911 MALIGNANT NEOPLASM OF RIGHT BREAST IN FEMALE, ESTROGEN RECEPTOR POSITIVE, UNSPECIFIED SITE OF BREAST: ICD-10-CM

## 2025-05-16 PROCEDURE — 99213 OFFICE O/P EST LOW 20 MIN: CPT | Performed by: NURSE PRACTITIONER

## 2025-05-16 ASSESSMENT — PAIN SCALES - GENERAL: PAINLEVEL_OUTOF10: 0-NO PAIN

## 2025-05-16 NOTE — PROGRESS NOTES
Radiation Oncology Follow-Up    Patient Name:  Yvrose John  MRN:  [id number]  :  1965    Referring Provider: Estela Osman APR*  Primary Care Provider: Garfield Pal DO  Care Team: Patient Care Team:  Garfield Pal DO as PCP - General  Estela Osman APRN-CNP as PCP - [facility]  Garfield Pal DO as Primary Care Provider    Date of Service: 2025     Cancer Staging:          Breast         AJCC Edition: 8th (AJCC), Diagnosis Date: 2022, Stage(no match), ypT1c ypN2a cM0  G2     Diagnosis:   60 year-old female with clinical T2 N1 M0 invasive ductal carcinoma of the right breast status post neoadjuvant TCHP x1 followed by THP x5 followed by a right partial  mastectomy with sentinel lymph node biopsy     Oncologic History:     - Pt initially appreciated a mass in her right breast in early May.  There was also a triangular-shaped rash around her right nipple.       - On 2022 she underwent a mammogram which revealed an irregular mass in the superior right breast with pleomorphic calcifications as well as an oval mass in the inferior right breast which was slightly more prominent.  She underwent a right breast  ultrasound the same day directed at the 12 o'clock position, 11 cm from the nipple which revealed a 1.2 x 1.4 x 1.2 cm hypoechoic mass.  At the 7 o'clock position, 11 cm from the nipple was a benign-appearing intramammary lymph node.  Axillary ultrasound  revealed multiple abnormal lymph nodes the largest measuring 2.3 x 1.8 x 3 cm.       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  Right breast core biopsy performed 6/3/2022 revealed invasive ductal carcinoma, grade 2-3.  Estrogen receptors stained positive at greater than  95%.  Progesterone receptors stained positive at 40%.  HER2/johnna was 3+ IHC.  Lymph node biopsy was consistent with metastatic carcinoma.       - She underwent a PET  scan on 7/5/2022 which revealed a 2.5 x 1 cm right breast mass with 2 hypermetabolic right axillary lymph nodes.  She saw Dr. Mullins who recommended neoadjuvant chemotherapy.  She received 1 cycle of TCHP which was complicated by  papilledema and the carboplatinum was discontinued.  She then received 5 additional cycles of THP.  Her chemotherapy was delivered between 7/1/2022 and 10/17/2022.    - CT scan of the chest abdomen and pelvis following chemotherapy on 11/11/2022 which revealed a nonspecific right lower lobe lung nodule as well as likely cysts in the liver.  There was resolution of the right axillary lymphadenopathy.  She had desired  mastectomies with reconstruction however had difficulty meeting with the plastic surgeon and decided to proceed with breast conservation so as to not delay her care.       - On 11/30/2022 she underwent a right partial mastectomy with sentinel lymph node biopsy and completion axillary lymph node dissection.  Pathology revealed a 1.4 cm invasive ductal carcinoma, grade 2.  No angiolymphatic invasion was present.  Ductal carcinoma  in situ of the solid and cribriform subtypes, nuclear grade 2 was present.  There was no element of extensive intraductal component.  The resection margins were negative.  Ductal carcinoma in situ came within 1 mm of the medial margin of resection.  4  sentinel lymph nodes were removed, all 3 of which contained metastatic disease with scattered deposits of tumor in the perinodal soft tissue.  An additional 14 axillary lymph nodes were removed, 1 of which contained metastatic disease.  Thus, the total  was 4 of 18 positive lymph nodes.  Original plan was to undergo a completion mastectomy with a risk reducing contralateral mastectomy and bilateral reconstruction on 1/25/2023.      - She saw Dr. Chowdary who did not want to delay her adjuvant chemotherapy any longer and felt that radiation also probably should not be delayed.       - 2/6/23 - 3/16/23:  Radiation therapy to the right breast, axilla, SCV and regional lymph nodes consisting of a dose of 50 Gy with additional 10 Gy to the tumor bed for a total of 60 Gy.     - Adjuvant TDM1 initiated 1/24/23 and completed 14 cycles 10/2023    - Adjuvant endocrine therapy with tamoxifen; switched to anastrozole after a few months after confirmation of menopause.    SUBJECTIVE  History of Present Illness:   Yvrose John is  here today for routine radiation follow up/surveillance visit.  She is feeling well overall.  She continues working full time as manager. She has had significant lymphedema of right breast with hardening of tissue/fibrosis.  She went to PT and was getting a lymphedema pump however insurance would not cover any services and she stopped going and returned the pump. She took PTX and Vit E for 3 months but stopped due to nausea. She is scheduled for  completion mastectomy with BULMARO flap reconstruction in June. She has upper arm swelling and decrease in ROM.  She is currently taking anastrozole. No significant side effects other that hot flashes. No headaches, fever, chills, cough, SOB, chest pain, GI  or  complaints and no bony pain.    Review of Systems:    Review of Systems   All other systems reviewed and are negative.    Performance Status:   The Karnofsky performance scale today is 90, Able to carry on normal activity; minor signs or symptoms of disease (ECOG equivalent 0).      OBJECTIVE    Current Outpatient Medications:     anastrozole (Arimidex) 1 mg tablet, Take 1 tablet (1 mg total) by mouth once daily., Disp: 90 tablet, Rfl: 3    arm brace (Elbow Compression Sleeve) misc, Lymphedema Sleeve  and Gauntlet eval and fit 20-30 mmHG for right arm, Disp: 1 each, Rfl: 0     Physical Exam  Constitutional:       Appearance: Normal appearance.   HENT:      Head: Normocephalic and atraumatic.      Nose: Nose normal.      Mouth/Throat:      Mouth: Mucous membranes are moist.      Pharynx:  Oropharynx is clear.   Eyes:      Conjunctiva/sclera: Conjunctivae normal.      Pupils: Pupils are equal, round, and reactive to light.   Cardiovascular:      Rate and Rhythm: Normal rate and regular rhythm.   Pulmonary:      Effort: Pulmonary effort is normal.      Breath sounds: Normal breath sounds.   Chest:   Breasts:     Right: Skin change present. No inverted nipple (Nipple edematous), mass or nipple discharge.      Left: Normal. No swelling, inverted nipple, mass, nipple discharge or tenderness.      Comments: Right breast with well healed surgical incision.  Breast lymphedema with significant fibrosis, mildly erythematous and firm peau d'orange texture.    Abdominal:      Palpations: Abdomen is soft.   Musculoskeletal:         General: No swelling. Normal range of motion.      Cervical back: Normal range of motion.   Lymphadenopathy:      Cervical: No cervical adenopathy.      Upper Body:      Right upper body: No supraclavicular or axillary adenopathy.      Left upper body: No supraclavicular or axillary adenopathy.   Skin:     General: Skin is warm and dry.   Neurological:      General: No focal deficit present.      Mental Status: She is alert and oriented to person, place, and time.   Psychiatric:         Mood and Affect: Mood normal.         Behavior: Behavior normal.         RESULTS:     Narrative & Impression   Interpreted By:  RANDALL AGMEZ MD  MRN: [id number]  Patient Name: ARUN PAIZ     STUDY:  DIGITAL DIAG MAMM BILAT WITH JOSE;  6/2/2023 11:09 am     ACCESSION NUMBER(S):  [id number]     ORDERING CLINICIAN:  GISELE CANO     INDICATION:  Right lumpectomy, chemotherapy, and radiation.     COMPARISON:  11/30/2022, 05/24/2022, 06/10/2013, and priors dating back to 2012     FINDINGS:  2D and tomosynthesis images were reviewed at 1 mm slice thickness.     There are areas of scattered fibroglandular tissue. Interval  postsurgical parenchymal scarring and surgical clips are seen in  the  superolateral right breast at middle to posterior depth. Diffuse  marked right breast skin and trabecular thickening is present, likely  representing post radiation/postoperative changes.   No suspicious  masses or calcifications are identified in either breast.     IMPRESSION:  No mammographic evidence of malignancy.  Interval right breast  posttreatment changes. Marked right breast skin and trabecular  thickening, likely post radiation/postoperative changes, for which  clinical correlation is recommended.     BI-RADS CATEGORY:     Category: 2 - Benign.  Recommendation: Annual mammogram   Narrative  Name: ARUN PAIZ    Patient ID: [id number] YOB: 1965 Height: 63.0 in.      Gender:     Female   Exam Date:  07/05/2023 Weight: 195.0 lbs.    Indications: breast ca, CAFFEINE, CHOLECYSTECTOMY, Hysterectomy    Fractures:   finger                                                Treatments:  ANASTROZOLE                                            LEFT FEMUR - TOTAL  The bone mineral density : 1.072 g/cm2  T-score : 0.5    % of young normal mean :106 %  Z-score : 0.7    % of age matched mean : 109 %    % change vs. Previous : -     % change vs. Baseline : baseline    *Indicates significant change based on 95% confidence interval.    LEFT FEMUR - NECK  The bone mineral density : 0.932 g/cm2  T-score : -0.8    % of young normal mean: 90 %  Z-score : -0.2    % of age matched mean : 97 %    % change vs. Previous : -     % change vs. Baseline : baseline    *Indicates significant change based on 95% confidence interval.    SPINE L1-L3  The bone mineral density is 1.229 g/cm2  T-score : 0.4   % of young normal mean is 104 %  Z-score : 0.5    % of age matched mean is 105 %    % change vs. Previous : -     % change vs. Baseline : baseline    *Indicates significant change based on 95% confidence interval.     World Health Organization (WHO) criteria for post-menopausal,   Women:     Normal:        T-score at or above -1 SD         Osteopenia:   T-score between -1 and -2.5 SD     Osteoporosis: T-score at or below -2.5 SD          10-Year Fracture Risk:  Major Osteoporotic Fracture -      Hip Fracture                -      Reported Risk Factors       None      Interpretation:  According to World Health Organization criteria, classification is  normal.     ASSESSMENT:  60 y.o. female with triple positive right sided breast cancer s/p neoadjuvant chemotherapy followed by breast conserving surgery followed by radiation therap. R breast lymphedema, fibrosis. Scheduled for completion mastectomy and BULMARO reconstruction in June. Radiation follow up in 12 mo. Call with any questions or concerns.     Moraima Osman CNP  [phone]

## 2025-05-19 ENCOUNTER — APPOINTMENT (OUTPATIENT)
Dept: PLASTIC SURGERY | Facility: CLINIC | Age: 60
End: 2025-05-19
Payer: COMMERCIAL

## 2025-05-19 VITALS
DIASTOLIC BLOOD PRESSURE: 89 MMHG | RESPIRATION RATE: 18 BRPM | BODY MASS INDEX: 36.61 KG/M2 | WEIGHT: 198.7 LBS | SYSTOLIC BLOOD PRESSURE: 133 MMHG

## 2025-05-19 DIAGNOSIS — Z01.818 PRE-OP EXAM: ICD-10-CM

## 2025-05-19 DIAGNOSIS — D84.9 IMMUNODEFICIENCY, UNSPECIFIED: ICD-10-CM

## 2025-05-19 DIAGNOSIS — N65.0: ICD-10-CM

## 2025-05-19 DIAGNOSIS — Z71.89 ENCOUNTER TO DISCUSS BREAST RECONSTRUCTION: Primary | ICD-10-CM

## 2025-05-19 DIAGNOSIS — J96.01 ACUTE RESPIRATORY FAILURE WITH HYPOXIA: ICD-10-CM

## 2025-05-19 DIAGNOSIS — N64.89 ACQUIRED BREAST DEFORMITY: ICD-10-CM

## 2025-05-19 PROCEDURE — 99213 OFFICE O/P EST LOW 20 MIN: CPT

## 2025-05-19 ASSESSMENT — PAIN SCALES - GENERAL: PAINLEVEL_OUTOF10: 0-NO PAIN

## 2025-05-20 ENCOUNTER — CLINICAL SUPPORT (OUTPATIENT)
Dept: PLASTIC SURGERY | Facility: CLINIC | Age: 60
End: 2025-05-20
Payer: COMMERCIAL

## 2025-05-20 NOTE — PROGRESS NOTES
Patient came for pre op photos as well as vectra assessment.     Vectra was calibrated just prior to this patient so it should be accurate, however the patient has significant difference between the two that vectra could potentially struggle with, because of this I did the assessment twice and got the same volumes both times:    R - 1192.2cc  L - 931.1cc

## 2025-05-21 LAB
ALBUMIN SERPL-MCNC: 5.1 G/DL (ref 3.6–5.1)
ALP SERPL-CCNC: 109 U/L (ref 37–153)
ALT SERPL-CCNC: 56 U/L (ref 6–29)
ANION GAP SERPL CALCULATED.4IONS-SCNC: 13 MMOL/L (CALC) (ref 7–17)
AST SERPL-CCNC: 49 U/L (ref 10–35)
BASOPHILS # BLD AUTO: 29 CELLS/UL (ref 0–200)
BASOPHILS NFR BLD AUTO: 0.4 %
BILIRUB SERPL-MCNC: 0.7 MG/DL (ref 0.2–1.2)
BUN SERPL-MCNC: 14 MG/DL (ref 7–25)
CALCIUM SERPL-MCNC: 9.8 MG/DL (ref 8.6–10.4)
CHLORIDE SERPL-SCNC: 105 MMOL/L (ref 98–110)
CO2 SERPL-SCNC: 22 MMOL/L (ref 20–32)
CREAT SERPL-MCNC: 0.66 MG/DL (ref 0.5–1.03)
EGFRCR SERPLBLD CKD-EPI 2021: 102 ML/MIN/1.73M2
EOSINOPHIL # BLD AUTO: 173 CELLS/UL (ref 15–500)
EOSINOPHIL NFR BLD AUTO: 2.4 %
ERYTHROCYTE [DISTWIDTH] IN BLOOD BY AUTOMATED COUNT: 13.2 % (ref 11–15)
GLUCOSE SERPL-MCNC: 90 MG/DL (ref 65–139)
HCT VFR BLD AUTO: 47.3 % (ref 35–45)
HGB BLD-MCNC: 15.5 G/DL (ref 11.7–15.5)
LYMPHOCYTES # BLD AUTO: 1094 CELLS/UL (ref 850–3900)
LYMPHOCYTES NFR BLD AUTO: 15.2 %
MCH RBC QN AUTO: 32.6 PG (ref 27–33)
MCHC RBC AUTO-ENTMCNC: 32.8 G/DL (ref 32–36)
MCV RBC AUTO: 99.4 FL (ref 80–100)
MONOCYTES # BLD AUTO: 562 CELLS/UL (ref 200–950)
MONOCYTES NFR BLD AUTO: 7.8 %
NEUTROPHILS # BLD AUTO: 5342 CELLS/UL (ref 1500–7800)
NEUTROPHILS NFR BLD AUTO: 74.2 %
PLATELET # BLD AUTO: 207 THOUSAND/UL (ref 140–400)
PMV BLD REES-ECKER: 10.4 FL (ref 7.5–12.5)
POTASSIUM SERPL-SCNC: 3.8 MMOL/L (ref 3.5–5.3)
PROT SERPL-MCNC: 7.1 G/DL (ref 6.1–8.1)
RBC # BLD AUTO: 4.76 MILLION/UL (ref 3.8–5.1)
SODIUM SERPL-SCNC: 140 MMOL/L (ref 135–146)
WBC # BLD AUTO: 7.2 THOUSAND/UL (ref 3.8–10.8)

## 2025-05-23 ENCOUNTER — PRE-ADMISSION TESTING (OUTPATIENT)
Dept: PREADMISSION TESTING | Facility: HOSPITAL | Age: 60
End: 2025-05-23
Payer: COMMERCIAL

## 2025-05-23 VITALS
OXYGEN SATURATION: 95 % | SYSTOLIC BLOOD PRESSURE: 138 MMHG | WEIGHT: 197.9 LBS | DIASTOLIC BLOOD PRESSURE: 86 MMHG | HEIGHT: 63 IN | BODY MASS INDEX: 35.07 KG/M2 | TEMPERATURE: 97.9 F

## 2025-05-23 DIAGNOSIS — C77.3 BREAST CANCER METASTASIZED TO AXILLARY LYMPH NODE, RIGHT: ICD-10-CM

## 2025-05-23 DIAGNOSIS — C50.911 BREAST CANCER METASTASIZED TO AXILLARY LYMPH NODE, RIGHT: ICD-10-CM

## 2025-05-23 LAB
ABO GROUP (TYPE) IN BLOOD: NORMAL
ANTIBODY SCREEN: NORMAL
RH FACTOR (ANTIGEN D): NORMAL

## 2025-05-23 PROCEDURE — 87081 CULTURE SCREEN ONLY: CPT

## 2025-05-23 PROCEDURE — 86850 RBC ANTIBODY SCREEN: CPT

## 2025-05-23 PROCEDURE — 99205 OFFICE O/P NEW HI 60 MIN: CPT | Performed by: NURSE PRACTITIONER

## 2025-05-23 PROCEDURE — 36415 COLL VENOUS BLD VENIPUNCTURE: CPT

## 2025-05-23 RX ORDER — CHLORHEXIDINE GLUCONATE ORAL RINSE 1.2 MG/ML
SOLUTION DENTAL
Qty: 15 ML | Refills: 0 | Status: SHIPPED | OUTPATIENT
Start: 2025-05-23

## 2025-05-23 RX ORDER — CHLORHEXIDINE GLUCONATE 40 MG/ML
SOLUTION TOPICAL
Qty: 473 ML | Refills: 0 | Status: SHIPPED | OUTPATIENT
Start: 2025-05-23

## 2025-05-23 ASSESSMENT — DUKE ACTIVITY SCORE INDEX (DASI)
CAN YOU RUN A SHORT DISTANCE: YES
CAN YOU TAKE CARE OF YOURSELF (EAT, DRESS, BATHE, OR USE TOILET): YES
CAN YOU DO HEAVY WORK AROUND THE HOUSE LIKE SCRUBBING FLOORS OR LIFTING AND MOVING HEAVY FURNITURE: YES
CAN YOU DO YARD WORK LIKE RAKING LEAVES, WEEDING OR PUSHING A MOWER: YES
TOTAL_SCORE: 58.2
CAN YOU DO LIGHT WORK AROUND THE HOUSE LIKE DUSTING OR WASHING DISHES: YES
CAN YOU CLIMB A FLIGHT OF STAIRS OR WALK UP A HILL: YES
CAN YOU WALK INDOORS, SUCH AS AROUND YOUR HOUSE: YES
CAN YOU HAVE SEXUAL RELATIONS: YES
CAN YOU DO MODERATE WORK AROUND THE HOUSE LIKE VACUUMING, SWEEPING FLOORS OR CARRYING GROCERIES: YES
CAN YOU WALK A BLOCK OR TWO ON LEVEL GROUND: YES
CAN YOU PARTICIPATE IN STRENOUS SPORTS LIKE SWIMMING, SINGLES TENNIS, FOOTBALL, BASKETBALL, OR SKIING: YES
DASI METS SCORE: 9.9
CAN YOU PARTICIPATE IN MODERATE RECREATIONAL ACTIVITIES LIKE GOLF, BOWLING, DANCING, DOUBLES TENNIS OR THROWING A BASEBALL OR FOOTBALL: YES

## 2025-05-23 ASSESSMENT — PAIN - FUNCTIONAL ASSESSMENT: PAIN_FUNCTIONAL_ASSESSMENT: 0-10

## 2025-05-23 ASSESSMENT — ENCOUNTER SYMPTOMS
NECK NEGATIVE: 1
MUSCULOSKELETAL NEGATIVE: 1
GASTROINTESTINAL NEGATIVE: 1
CARDIOVASCULAR NEGATIVE: 1
EYES NEGATIVE: 1
CONSTITUTIONAL NEGATIVE: 1
ENDOCRINE NEGATIVE: 1
NEUROLOGICAL NEGATIVE: 1
RESPIRATORY NEGATIVE: 1

## 2025-05-23 ASSESSMENT — PAIN SCALES - GENERAL: PAINLEVEL_OUTOF10: 0 - NO PAIN

## 2025-05-23 ASSESSMENT — LIFESTYLE VARIABLES: SMOKING_STATUS: NONSMOKER

## 2025-05-23 NOTE — NURSING NOTE
Patient seen in PAT. Orders reviewed with PAT Provider.     Following labs obtained by documenting RN:   MRSA SWAB, T/S      Patient discharged with: Pre-procedure instructions/AVS.        Jasmin BUSTOSN, RN  [facility]   [facility]

## 2025-05-23 NOTE — CPM/PAT H&P
2/9/2018         RE: Patrice Nicole  [address]  [facility] [address]        Dear Colleague,    Thank you for referring your patient, Patrice Nicole, to the [facility]. Please see a copy of my visit note below.    [facility] - PRIMARY CARE SKIN    CC : Lesion(s)  SUBJECTIVE:                                                    Patrice Nicole is a 32 year old male who presents to clinic today for follow-up of a keloid on the back of the scalp.    Last treatment : 1/5/18  Treatment : intralesional triamcinolone 8 mg injected into two keloids  Treatment number : 5  Response : The keloid is still present, but may have diminished slightly in texture.    Personal history of skin cancer : NO.  Family history of skin cancer : NO.    Sun Exposure History  Previous history of significant sun exposure:  Blistering sunburns : NO  Tanning beds : NO.  Sunscreen Use : NO.  Sun-protective clothing use : No  Wide-brimmed hats : Yes  Sunglasses : Yes  Seeks shade : No    Occupation : pest control (both indoor & outdoor).    Refer to electronic medical record (EMR) for past medical history and medications.    INTEGUMENTARY/SKIN: POSITIVE for non-healing lesion  ROS : 14 point review of systems was negative except the symptoms listed above in the HPI.    This document serves as a record of the services and decisions personally performed and made by Alivia Cuellar MD. It was created on her behalf by Phong Bonilla, a trained medical scribe.  The creation of this document is based on the scribe's personal observations and the provider's statements to the medical scribe.  Phong Bonilla, February 9, 2018 10:22 AM      OBJECTIVE:                                                    GENERAL: healthy, alert and no distress  SKIN: Baxter Skin Type - VI.  Neck were examined. The dermatoscope was used to help evaluate pigmented lesions.  Skin Pertinent Findings:  Mid-occipital scalp : 15 x 5 mm in size, raised slightly  CPM/PAT Evaluation       Name: Yvrose John (Yvrose John)  /Age: 1965/60 y.o.     Visit Type:   In-Person       Chief Complaint: perioperative evaluation      HPI  The patient is a 60 year old female with history of right breast cancer, T2 N1 M0 invasive ductal carcinoma, metastasized to the right axillary lymph node. She is s/p right lumpectomy with removal of 18 lymph nodes on 22 with Dr. Maldonado. She has had 29 radiation treatments that were completed 2023 and 20 chemo treatments completed 2023. She has severe radiation-induced skin changes at the right breast. She is referred now by Dr. Cornelius Liz and Dr. Regi Maldonado for perioperative evaluation in anticipation of right mastectomy, reconstruction of the breast using free flap on 25.    Medical History[1]    Surgical History[2]    Patient Sexual activity questions deferred to the physician.    Family History[3]    Allergies[4]    Prior to Admission medications    Medication Sig Start Date End Date Taking? Authorizing Provider   anastrozole (Arimidex) 1 mg tablet Take 1 tablet (1 mg total) by mouth once daily. 24   Merlyn Rodriguez PA-C   arm brace (Elbow Compression Sleeve) misc Lymphedema Sleeve  and Gauntlet eval and fit 20-30 mmHG for right arm 24   Merlyn Rodriguez PA-C        PAT ROS:   Constitutional:   neg    Neuro/Psych:   neg    Eyes:   neg     use of corrective lenses  Ears:   neg    Nose:   neg    Mouth:   neg    Throat:   neg    Neck:   neg    Cardio:   neg    Respiratory:   neg    Endocrine:   neg    GI:   neg    :   neg    Musculoskeletal:   neg    Hematologic:   neg    Skin:  neg        Physical Exam  Vitals reviewed.   Constitutional:       Appearance: Normal appearance.   HENT:      Head: Normocephalic and atraumatic.      Nose: Nose normal.      Mouth/Throat:      Mouth: Mucous membranes are moist.      Pharynx: Oropharynx is clear.   Eyes:      Extraocular Movements: Extraocular  "movements intact.      Conjunctiva/sclera: Conjunctivae normal.      Pupils: Pupils are equal, round, and reactive to light.   Cardiovascular:      Rate and Rhythm: Normal rate and regular rhythm.      Pulses: Normal pulses.      Heart sounds: Normal heart sounds.   Pulmonary:      Effort: Pulmonary effort is normal.      Breath sounds: Normal breath sounds.   Musculoskeletal:         General: Normal range of motion.      Cervical back: Normal range of motion.   Skin:     General: Skin is warm and dry.   Neurological:      General: No focal deficit present.      Mental Status: She is alert and oriented to person, place, and time.   Psychiatric:         Mood and Affect: Mood normal.         Behavior: Behavior normal.          PAT AIRWAY:   Airway:     Mallampati::  IV    TM distance::  >3 FB    Neck ROM::  Full  normal            Visit Vitals  /86   Temp 36.6 °C (97.9 °F) (Temporal)   Ht 1.6 m (5' 3\")   Wt 89.8 kg (197 lb 14.4 oz)   SpO2 95%   BMI 35.06 kg/m²   OB Status Postmenopausal   Smoking Status Former   BSA 2 m²       DASI Risk Score      Flowsheet Row Pre-Admission Testing [facility]23/[facility] in [facility]   Can you take care of yourself (eat, dress, bathe, or use toilet)?  2.75 filed at 05/23/2025 1010   Can you walk indoors, such as around your house? 1.75 filed at 05/23/2025 1010   Can you walk a block or two on level ground?  2.75 filed at 05/23/2025 1010   Can you climb a flight of stairs or walk up a hill? 5.5 filed at 05/23/2025 1010   Can you run a short distance? 8 filed at 05/23/2025 1010   Can you do light work around the house like dusting or washing dishes? 2.7 filed at 05/23/2025 1010   Can you do moderate work around the house like vacuuming, sweeping floors or carrying groceries? 3.5 filed at 05/23/2025 1010   Can you do heavy work around the house like scrubbing floors or lifting and moving heavy furniture?  8 filed at 05/23/2025 1010   Can you do yard work like raking " flatter, softer in texture lesion consistent with keloid.  Name : Triamcinolone acetonide (Kenalog) injection.  Indication : keloids.  Completed by : lAivia Cuellar MD  Note : Prior to the procedure, we discussed possible hypo- and hyperpigmentation or depression of the skin post-procedure. Explained that more then one injection, up to six injections, spaced 4-6 weeks apart may be necessary.    Cryotherapy burst of 5 seconds prior to injection as documented below.    Skin was cleansed with alcohol. 0.3 mL triamcinolone acetonide 40 mg/mL drawn up and injected intralesionally into the lesion.  Total injected :   Into single lesion : 0.3 mL = 12 mg  Lot # : [id number] Expiration Date : Feb 2019  Blanching was present during the injection. Minimal bleeding occurred. Patient left in satisfactory condition.  Treatment number : 5.    MDM : Previously discussed other options such as surgery, laser       ASSESSMENT:                                                      Encounter Diagnosis   Name Primary?     Keloid scar Yes         PLAN:                                                    Patient Instructions   FUTURE APPOINTMENTS  Follow up in 4-6 weeks.         PROCEDURES:                                                    None.    TT : 20 minutes.  CT : 15 minutes.      The information in this document, created by the medical scribe for me, accurately reflects the services I personally performed and the decisions made by me. I have reviewed and approved this document for accuracy prior to leaving the patient care area.  Alivia Cuellar MD February 9, 2018 10:22 AM  [facility] - PRIMARY CARE SKIN    Again, thank you for allowing me to participate in the care of your patient.        Sincerely,        Cristal Cuellar MD     leaves, weeding or pushing a mower? 4.5 filed at 05/23/2025 1010   Can you have sexual relations? 5.25 filed at 05/23/2025 1010   Can you participate in moderate recreational activities like golf, bowling, dancing, doubles tennis or throwing a baseball or football? 6 filed at 05/23/2025 1010   Can you participate in strenous sports like swimming, singles tennis, football, basketball, or skiing? 7.5 filed at 05/23/2025 1010   DASI SCORE 58.2 filed at 05/23/2025 1010   METS Score (Will be calculated only when all the questions are answered) 9.9 filed at 05/23/2025 1010          Caprini DVT Assessment      Flowsheet Row Pre-Admission Testing from 5/23/2025 in [facility] ED to Hosp-Admission (Discharged) from 11/16/2023 in [facility] with Isaac Mo MD and Cristin Santos, DO   DVT Score (IF A SCORE IS NOT CALCULATING, MUST SELECT A BMI TO COMPLETE) 8 filed at 05/23/2025 1007 6 filed at 11/16/2023 1654   Medical Factors Present cancer, chemotherapy, or previous malignancy filed at 05/23/2025 1007 --   Surgical Factors Major surgery planned, including arthroscopic and laproscopic (1-2 hours) filed at 05/23/2025 1007 --   BMI (BMI MUST BE CHOSEN) 31-40 (Obesity) filed at 05/23/2025 1007 31-40 (Obesity) filed at 11/16/2023 1654   RETIRED: History -- Previous malignancy filed at 11/16/2023 1654   RETIRED: Age -- 40-59 years filed at 11/16/2023 1654          Modified Frailty Index    No data to display       [id number] Stroke Risk Points  Current as of 5 minutes ago        N/A 0 to 9 Points:      Last Change: N/A          The [id number] risk score (Lip ZECHARIAH, et al. 2009. © 2010 American College [employer]) quantifies the risk of stroke for a patient with atrial fibrillation. For patients without atrial fibrillation or under the age of 18 this score appears as N/A. Higher score values generally indicate higher risk of stroke.        This score is not applicable to this  patient. Components are not calculated.          Revised Cardiac Risk Index      Flowsheet Row Pre-Admission Testing from 5/23/2025 in [facility]   High-Risk Surgery (Intraperitoneal, Intrathoracic,Suprainguinal vascular) 0 filed at 05/23/2025 1007   History of ischemic heart disease (History of MI, History of positive exercuse test, Current chest paint considered due to myocardial ischemia, Use of nitrate therapy, ECG with pathological Q Waves) 0 filed at 05/23/2025 1007   History of congestive heart failure (pulmonary edemia, bilateral rales or S3 gallop, Paroxysmal nocturnal dyspnea, CXR showing pulmonary vascular redistribution) 0 filed at 05/23/2025 1007   History of cerebrovascular disease (Prior TIA or stroke) 0 filed at 05/23/2025 1007   Pre-operative insulin treatment 0 filed at 05/23/2025 1007   Pre-operative creatinine>2 mg/dl 0 filed at 05/23/2025 1007   Revised Cardiac Risk Calculator 0 filed at 05/23/2025 1007          Apfel Simplified Score      Flowsheet Row Pre-Admission Testing from 5/23/2025 in [facility]   Smoking status 1 filed at 05/23/2025 1007   History of motion sickness or PONV  0 filed at 05/23/2025 1007   Use of postoperative opioids 1 filed at 05/23/2025 1007   Gender - Female 1=Yes filed at 05/23/2025 1007   Apfel Simplified Score Calculator 3 filed at 05/23/2025 1007          Risk Analysis Index Results This Encounter    No data found in the last 10 encounters.       Stop Bang Score      Flowsheet Row Pre-Admission Testing from 5/23/2025 in [facility]   Do you snore loudly? 0 filed at 05/23/2025 1009   Do you often feel tired or fatigued after your sleep? 0 filed at 05/23/2025 1009   Has anyone ever observed you stop breathing in your sleep? 0 filed at 05/23/2025 1009   Do you have or are you being treated for high blood pressure? 0 filed at 05/23/2025 1009   Recent BMI (Calculated) 37.6 filed at 05/23/2025 1009   Is BMI greater than  35 kg/m2? 1=Yes filed at 05/23/2025 1009   Age older than 50 years old? 1=Yes filed at 05/23/2025 1009   Is your neck circumference greater than 17 inches (Male) or 16 inches (Female)? 0 filed at 05/23/2025 1009   Gender - Male 0=No filed at 05/23/2025 1009   STOP-BANG Total Score 2 filed at 05/23/2025 1009          Prodigy: High Risk  Total Score: 0          ARISCAT Score for Postoperative Pulmonary Complications      Flowsheet Row Pre-Admission Testing from 5/23/2025 in [facility]   Age Calculated Score 3 filed at 05/23/2025 1007   Preoperative SpO2 0 filed at 05/23/2025 1007   Respiratory infection in the last month Either upper or lower (i.e., URI, bronchitis, pneumonia), with fever and antibiotic treatment 0 filed at 05/23/2025 1007   Preoperative anemia (Hgb less than 10 g/dl) 0 filed at 05/23/2025 1007   Surgical incision  0 filed at 05/23/2025 1007   Duration of surgery  0 filed at 05/23/2025 1007   Emergency Procedure  0 filed at 05/23/2025 1007   ARISCAT Total Score  3 filed at 05/23/2025 1007          Shereen Perioperative Risk for Myocardial Infarction or Cardiac Arrest (ADAM)    No data to display       Recent Results (from the past 4 weeks)   CBC and Auto Differential    Collection Time: 05/20/25 11:18 AM   Result Value Ref Range    WHITE BLOOD CELL COUNT 7.2 3.8 - 10.8 Thousand/uL    RED BLOOD CELL COUNT 4.76 3.80 - 5.10 Million/uL    HEMOGLOBIN 15.5 11.7 - 15.5 g/dL    HEMATOCRIT 47.3 (H) 35.0 - 45.0 %    MCV 99.4 80.0 - 100.0 fL    MCH 32.6 27.0 - 33.0 pg    MCHC 32.8 32.0 - 36.0 g/dL    RDW 13.2 11.0 - 15.0 %    PLATELET COUNT 207 140 - 400 Thousand/uL    MPV 10.4 7.5 - 12.5 fL    ABSOLUTE NEUTROPHILS 5,342 1,500 - 7,800 cells/uL    ABSOLUTE LYMPHOCYTES 1,094 850 - 3,900 cells/uL    ABSOLUTE MONOCYTES 562 200 - 950 cells/uL    ABSOLUTE EOSINOPHILS 173 15 - 500 cells/uL    ABSOLUTE BASOPHILS 29 0 - 200 cells/uL    NEUTROPHILS 74.2 %    LYMPHOCYTES 15.2 %    MONOCYTES 7.8 %     EOSINOPHILS 2.4 %    BASOPHILS 0.4 %   Comprehensive metabolic panel    Collection Time: 05/20/25 11:18 AM   Result Value Ref Range    GLUCOSE 90 65 - 139 mg/dL    UREA NITROGEN (BUN) 14 7 - 25 mg/dL    CREATININE 0.66 0.50 - 1.03 mg/dL    EGFR 102 > OR = 60 mL/min/1.73m2    SODIUM 140 135 - 146 mmol/L    POTASSIUM 3.8 3.5 - 5.3 mmol/L    CHLORIDE 105 98 - 110 mmol/L    CARBON DIOXIDE 22 20 - 32 mmol/L    ELECTROLYTE BALANCE 13 7 - 17 mmol/L (calc)    CALCIUM 9.8 8.6 - 10.4 mg/dL    PROTEIN, TOTAL 7.1 6.1 - 8.1 g/dL    ALBUMIN 5.1 3.6 - 5.1 g/dL    BILIRUBIN, TOTAL 0.7 0.2 - 1.2 mg/dL    ALKALINE PHOSPHATASE 109 37 - 153 U/L    AST 49 (H) 10 - 35 U/L    ALT 56 (H) 6 - 29 U/L      Testing/Diagnostic:   CT CHEST ABDOMEN PELVIS W IV CONTRAST; 4/16/2025   IMPRESSION:   Right upper lobe area of volume loss and consolidation appears   decreased compared to prior CT but there is still a subsegmental area   of collapse with bronchial crowding       No evidence of metastatic disease is identified in the chest, abdomen   or pelvis     CT ANGIO CHEST FOR PULMONARY EMBOLISM; 11/16/2023   IMPRESSION:  1.  No evidence of pulmonary embolus.  2. Interval presumed post treatment changes in geographic  distribution of the right upper lobe is infiltration of the right  anterior chest wall.  3. No new or enlarging intrathoracic mass.  4. Remaining findings appear stable.       TRANSTHORACIC ECHO; 10/4/23  CONCLUSIONS:   1. Left ventricular systolic function is normal with a 65% estimated ejection fraction.   2. Spectral Doppler shows an impaired relaxation pattern of left ventricular diastolic filling.   3. Strain values are normal, which imply normal myocardial function.      TRANSTHORACIC ECHO; 6/29/23  CONCLUSIONS:  1. Left ventricular systolic function is normal with a 65-70% estimated ejection fraction.  2. Spectral Doppler shows an impaired relaxation pattern of left ventricular diastolic filling.  3. Strain values are normal,  which imply normal myocardial function.      Assessment and Plan:     Anesthesia  The patient denies problems with anesthesia in the past such as PONV, prolonged sedation, awareness, dental damage, aspiration, cardiac arrest, difficult intubation, or unexpected hospital admissions.     Airway  No documented or reported history of airway difficulty.     Neurology  The patient has no neurological diagnoses or significant findings on chart review, clinical presentation, and evaluation. No grossly apparent neurological perioperative risk. The patient is at increased risk for perioperative stroke secondary to female gender, general anesthesia. Handouts for preoperative brain exercises given to patient.    HEENT  No diagnoses or significant findings on chart review or clinical presentation and evaluation.    Cardiovascular  No diagnoses or significant findings on chart review or clinical presentation and evaluation.hypertension     METS  The patient's functional capacity is greater than 4 METS.  RCRI  The patient meets 0 RCRI criteria and therefor has a 3.9% risk of major adverse cardiac complications.  ADAM score which indicates a 0% risk of intraoperative or 30-day postoperative MACE (major adverse cardiac event).    Cardiology Evaluation  The patient is not followed by cardiology. She has been seen in the past for palpitations (2018) likely in the setting of exertion and hormonal fluctuations. Cardiac testing as above. No recurrence.    She is scheduled for non-cardiac surgery associated with elevated risk. The patient has no major cardiac contraindications to non- cardiac surgery.    Pulmonary  No significant findings on chart review or clinical presentation and evaluation. The patient is at increased risk of perioperative pulmonary complications secondary to advanced age greater than 60.    STOP BANG 2, which places patient at low risk for having ROSA.  ARISCAT 3, low, 1.6% risk of in-hospital postoperative pulmonary  complications  PRODIGY 0, low risk of respiratory depression episode.     Patient given PI sheet for preoperative deep breathing exercises.  Encourage  incentive spirometry in the postoperative period as deemed necessary.    Endocrine  No diagnoses or significant findings on chart review or clinical presentation and evaluation.    Gastrointestinal  No diagnoses or significant findings on chart review or clinical presentation and evaluation.    Eat 10- 0,  self-perceived oropharyngeal dysphagia scale (0-40)     Genitourinary  No diagnoses or significant findings on chart review or clinical presentation and evaluation.    Renal  No renal diagnoses or significant findings on chart review or clinical presentation and evaluation. The patient has specific risk factors associated with increased risk of perioperative renal complications related to age greater than 55. Preventative measures include preoperative hydration.    Hematology/Oncology  The patient has history of right breast cancer s/p chemo/radiation therapy and right lumpectomy/ALND on 11/30/22.  Now with severe right breast changes post treatment changes scheduled for surgery with  Dr. Liz/Dr. Maldonado on 6/6/25.     Caprini score 8, high risk of perioperative VTE.     Patient instructed to ambulate as soon as possible postoperatively to decrease thromboembolic risk. Initiate mechanical DVT prophylaxis as soon as possible and initiate chemical prophylaxis when deemed safe from a bleeding standpoint post surgery.     Transfusion Evaluation  T&S not obtained. Low likelihood for perioperative transfusion of blood or blood products.    Musculoskeletal  No diagnoses or significant findings on chart review or clinical presentation and evaluation.    ID  No diagnoses or significant findings on chart review or clinical presentation and evaluation. MRSA screening obtained. Prescriptions and instructions given for Hibiclens and Peridex.    -Preoperative medication  instructions were provided and reviewed with the patient.  Any additional testing or evaluation was explained to the patient.  NPO Instructions were discussed, and the patient's questions were answered prior to conclusion of this encounter. Patient verbalized understanding of preoperative instructions. After Visit Summary given.      Labs ordered and reviewed  Type and screen and MRSA ordered, CBC with diff and CMP from 5/20/25 reviewed    No further work up indicated.              [1]   Past Medical History:  Diagnosis Date    Breast cancer 06/2022    Changes in skin texture 05/31/2022    Breast skin changes    History of mitral valve prolapse     History of rheumatic fever     Hx antineoplastic chemo     Personal history of irradiation     Personal history of other specified conditions 05/31/2022    History of lump of right breast    Rheumatic fever    [2]   Past Surgical History:  Procedure Laterality Date    BREAST LUMPECTOMY Right 11/30/2022    right lumpectomy w/ chemo and rad    DILATION AND CURETTAGE OF UTERUS  07/02/2013    Dilation And Curettage    HYSTEROSCOPY  07/02/2013    Hysteroscopy With Endometrial Ablation    IR CVC PORT REMOVAL  11/15/2023    IR CVC PORT REMOVAL 11/15/2023 Shawn Shahid MD AD CVEPINV    IR CVC PORT REMOVAL  11/15/2023    IR CVC PORT REMOVAL    OTHER SURGICAL HISTORY  07/12/2013    Laparoscopic Excision Left Fallopian Tube Cyst (___ Cm)    TOTAL ABDOMINAL HYSTERECTOMY  07/12/2013    Total Abdominal Hysterectomy    TUBAL LIGATION  07/02/2013    Tubal Ligation   [3]   Family History  Problem Relation Name Age of Onset    Hypertension Mother      Diabetes type II Father      Prostate cancer Father      Breast cancer Sister     [4] No Known Allergies     Cm)    TOTAL ABDOMINAL HYSTERECTOMY  07/12/2013    Total Abdominal Hysterectomy    TUBAL LIGATION  07/02/2013    Tubal Ligation   [3]   Family History  Problem Relation Name Age of Onset    Hypertension Mother      Diabetes type II Father      Prostate cancer Father      Breast cancer Sister     [4] No Known Allergies

## 2025-05-23 NOTE — PREPROCEDURE INSTRUCTIONS
Fasting Guidelines    NPO Instructions:    Do not eat any food after midnight the night before your surgery/procedure.  You may have up to 13.5 ounces of clear liquids until TWO hours before your instructed arrival time to the hospital. This includes water, black tea/coffee, (no milk or cream), apple juice, and/or electrolyte drinks (Gatorade).  You may chew gum up to TWO hours before your surgery/procedure.    Additional Instructions:     We have sent a prescription for Hibiclens soap and Peridex mouth wash to your preferred pharmacy.  If you have not already, Please  your prescription and start using as directed before surgery.  Follow the instruction sheet provided to you at your CPM/PAT appointment.    Avoid herbal supplements, multivitamins and NSAIDS (non-steroidal anti-inflammatory drugs) such as Advil, Aleve, Ibuprofen, Naproxen, Excedrin, Meloxicam or Celebrex for at least 7 days prior to surgery. May take Tylenol as needed.    Avoid tobacco and alcohol products for 24 hours prior to surgery.    CONTACT SURGEON'S OFFICE IF YOU DEVELOP:  * Fever = 100.4 F   * New respiratory symptoms (e.g. cough, shortness of breath, respiratory distress, sore throat)  * Recent loss of taste or smell  *Flu like symptoms such as headache, fatigue or gastrointestinal symptoms  * You develop any open sores, shingles, burning or painful urination   AND/OR:  * You no longer wish to have the surgery.  * Any other personal circumstances change that may lead to the need to cancel or defer this surgery.  *You were admitted to any hospital within one week of your planned procedure.    Seven/Six Days before Surgery:  Review your medication instructions, stop indicated medications    Day of Surgery:  Review your medication instructions, take indicated medications  Wear comfortable loose fitting clothing  Do not use moisturizers, creams, lotions or perfume  All jewelry and valuables should be left at home    Renetta Mcgowan  [facility]  [phone]  [phone]  Email-[email or web address]      Patient Information: Pre-Operative Infection Prevention Measures     Why did I have my nose, under my arms, and groin swabbed?  The purpose of the swab is to identify Staphylococcus aureus inside your nose or on your skin.  The swab was sent to the laboratory for culture.  A positive swab/culture for Staphylococcus aureus is called colonization or carriage.      What is Staphylococcus aureus?  Staphylococcus aureus, also known as “staph”, is a germ found on the skin or in the nose of healthy people.  Sometimes Staphylococcus aureus can get into the body and cause an infection.  This can be minor (such as pimples, boils, or other skin problems).  It might also be serious (such as a blood infection, pneumonia, or a surgical site infection).    What is Staphylococcus aureus colonization or carriage?  Colonization or carriage means that a person has the germ but is not sick from it.  These bacteria can be spread on the hands or when breathing or sneezing.    How is Staphylococcus aureus spread?  It is most often spread by close contact with a person or item that carries it.    What happens if my culture is positive for Staphylococcus aureus?  Your doctor/medical team will use this information to guide any antibiotic treatment which may be necessary.  Regardless of the culture results, we will clean the inside of your nose with a betadine swab just before you have your surgery.      Will I get an infection if I have Staphylococcus aureus in my nose or on my skin?  Anyone can get an infection with Staphylococcus aureus.  However, the best way to reduce your risk of infection is to follow the instructions provided to you for the use of your CHG soap and dental rinse.        Patient Information: Oral/Dental Rinse    What is oral/dental rinse?   It is a mouthwash. It is a way of cleaning the mouth with a  germ-killing solution before your surgery.  The solution contains chlorhexidine, commonly known as CHG.   It is used inside the mouth to kill a bacteria known as Staphylococcus aureus.  Let your doctor know if you are allergic to Chlorhexidine.    Why do I need to use CHG oral/dental rinse?  The CHG oral/dental rinse helps to kill a bacteria in your mouth known as Staphylococcus aureus.     This reduces the risk of infection at the surgical site.      Using your CHG oral/dental rinse  STEPS:  Use your CHG oral/dental rinse after you brush your teeth the night before (at bedtime) and the morning of your surgery.  Follow all directions on your prescription label.    Use the cap on the container to measure 15ml   Swish (gargle if you can) the mouthwash in your mouth for at least 30 seconds, (do not swallow) and spit out  After you use your CHG rinse, do not rinse your mouth with water, drink or eat.  Please refer to the prescription label for the appropriate time to resume oral intake      What side effects might I have using the CHG oral/dental rinse?  CHG rinse will stick to plaque on the teeth.  Brush and floss just before use.  Teeth brushing will help avoid staining of plaque during use.      Patient Information: Home Preoperative Antibacterial Shower      What is a home preoperative antibacterial shower?  This shower is a way of cleaning the skin with a germ-killing solution before surgery.  The solution contains chlorhexidine, commonly known as CHG.  CHG is a skin cleanser with germ-killing ability.  Let your doctor know if you are allergic to chlorhexidine.    Why do I need to take a preoperative antibacterial shower?  Skin is not sterile.  It is best to try to make your skin as free of germs as possible before surgery.  Proper cleansing with a germ-killing soap before surgery can lower the number of germs on your skin.  This helps to reduce the risk of infection at the surgical site.  Following the  instructions listed below will help you prepare your skin for surgery.      How do I use the solution?  Steps:  Begin using your CHG soap 5 days before your scheduled surgery on ________________________.    First, wash and rinse your hair using the CHG soap. Keep CHG soap away from ear canals and eyes.  Rinse completely, do not condition.  Hair extensions should be removed.  Wash your face with your normal soap and rinse.    Apply the CHG solution to a clean wet washcloth.  Turn the water off or move away from the water spray to avoid premature rinsing of the CHG soap as you are applying.   Firmly lather your entire body from the neck down.  Do not use on your face.  Pay special attention to the area(s) where your incision(s) will be located unless they are on your face.  Avoid scrubbing your skin too hard.  The important point is to have the CHG soap sit on your skin for 3 minutes.    When the 3 minutes are up, turn on the water and rinse the CHG solution off your body completely.   DO NOT wash with regular soap after you have used the CHG soap solution  Pat yourself dry with a clean, freshly-laundered towel.  DO NOT apply powders, deodorants, or lotions.  Dress in clean, freshly laundered nightclothes.    Be sure to sleep with clean, freshly laundered sheets.  Be aware that CHG will cause stains on fabrics; if you wash them with bleach after use.  Rinse your washcloth and other linens that have contact with CHG completely.  Use only non-chlorine detergents to launder the items used.   The morning of surgery is the fifth day.  Repeat the above steps and dress in clean comfortable clothing     Whom should I contact if I have any questions regarding the use of CHG soap?  Call the [facility] at [phone] if you have any questions.               Preoperative Brain Exercises    What are brain exercises?  A brain exercise is any activity that  engages your thinking (cognitive) skills.    What types of activities are considered brain exercises?  Jigsaw puzzles, crossword puzzles, word jumble, memory games, word search, and many more.  Many can be found free online or on your phone via a mobile cedric.    Why should I do brain exercises before my surgery?  More recent research has shown brain exercise before surgery can lower the risk of postoperative delirium (confusion) which can be especially important for older adults.  Patients who did brain exercises for 5 to 10 hours the days before surgery, cut their risk of postoperative delirium in half up to 1 week after surgery.         [facility]    Preoperative Deep Breathing Exercises    Why it is important to do deep breathing exercises before my surgery?  Deep breathing exercises strengthen your breathing muscles.  This helps you to recover after your surgery and decreases the chance of breathing complications.      How are the deep breathing exercises done?  Sit straight with your back supported.  Breathe in deeply and slowly through your nose. Your lower rib cage should expand and your abdomen may move forward.  Hold that breath for 3 to 5 seconds.  Breathe out through pursed lips, slowly and completely.  Rest and repeat 10 times every hour while awake.  Rest longer if you become dizzy or lightheaded.         Patient and Family Education             Ways You Can Help Prevent Blood Clots             This handout explains some simple things you can do to help prevent blood clots.      Blood clots are blockages that can form in the body's veins. When a blood clot forms in your deep veins, it may be called a deep vein thrombosis, or DVT for short. Blood clots can happen in any part of the body where blood flows, but they are most common in the arms and legs. If a piece of a blood clot breaks free and travels to the lungs, it is called a pulmonary embolus (PE). A PE can be a very  serious problem.         Being in the hospital or having surgery can raise your chances of getting a blood clot because you may not be well enough to move around as much as you normally do.         Ways you can help prevent blood clots in the hospital         Wearing SCDs. SCDs stands for Sequential Compression Devices.   SCDs are special sleeves that wrap around your legs  They attach to a pump that fills them with air to gently squeeze your legs every few minutes.   This helps return the blood in your legs to your heart.   SCDs should only be taken off when walking or bathing.   SCDs may not be comfortable, but they can help save your life.               Wearing compression stockings - if your doctor orders them. These special snug fitting stockings gently squeeze your legs to help blood flow.       Walking. Walking helps move the blood in your legs.   If your doctor says it is ok, try walking the halls at least   5 times a day. Ask us to help you get up, so you don't fall.      Taking any blood thinning medicines your doctor orders.        Page 1 of 2     [facility]; 3/23   Ways you can help prevent blood clots at home       Wearing compression stockings - if your doctor orders them. ? Walking - to help move the blood in your legs.       Taking any blood thinning medicines your doctor orders.      Signs of a blood clot or PE      Tell your doctor or nurse know right away if you have of the problems listed below.    If you are at home, seek medical care right away. Call 911 for chest pain or problems breathing.               Signs of a blood clot (DVT) - such as pain,  swelling, redness or warmth in your arm or leg      Signs of a pulmonary embolism (PE) - such as chest     pain or feeling short of breath

## 2025-05-25 LAB — STAPHYLOCOCCUS SPEC CULT: NORMAL

## 2025-05-30 NOTE — PROGRESS NOTES
Subjective :  Patient ID: Yvrose John is a 60 y.o. female presenting for a post operative visit from surgery on 6/6/25    History of Present Illness: History of right breast cancer, T2 N1 M0 invasive ductal carcinoma, metastasized to right axillary lymph node. Right lumpectomy with removal of 18 lymph nodes 11/30/22 with Dr. Maldonado. She had 29 radiation treatments completed March 2023 and 20 chemo treatments completed October 2023.  She presents today post operatively form surgery on 6/6/25 for a right mastectomy by Dr. Maldonado and Right BULMARO breast reconstruction with Dr. Liz.     Review of Systems  ROS: All 10 systems were reviewed and are unremarkable except for those mentioned in HPI.     Objective :  Physical Exam  Vitals and nursing note reviewed. Exam conducted with a chaperone present.   Constitutional:       General: She is not in acute distress.     Appearance: She is not ill-appearing.   Eyes:      Extraocular Movements: Extraocular movements intact.      Conjunctiva/sclera: Conjunctivae normal.      Pupils: Pupils are equal, round, and reactive to light.   Cardiovascular:      Rate and Rhythm: Normal rate and regular rhythm.      Pulses: Normal pulses.   Pulmonary:      Effort: Pulmonary effort is normal.      Breath sounds: Normal breath sounds.   Abdominal:      Palpations: Abdomen is soft. There is no mass.      Tenderness: There is no abdominal tenderness.      Hernia: No hernia is present.   Musculoskeletal:         General: No swelling or tenderness.      Cervical back: Normal range of motion and neck supple.   Skin:     Capillary Refill: Capillary refill takes less than 2 seconds.      Coloration: Skin is not jaundiced.      Findings: No bruising or rash.   Neurological:      General: No focal deficit present.      Mental Status: She is oriented to person, place, and time.   Psychiatric:         Mood and Affect: Mood normal.         Behavior: Behavior normal.         Thought Content: Thought  content normal.         Judgment: Judgment normal.         Assessment/Plan :    Activity:   -Light Activity as tolerated.  -No pushing, pulling, or lifting objects greater than 5 pounds for at least 8 weeks.  -Okay to shower. Avoid scrubbing of incision sites.     Surgical Site/Wound Care:   -Wound care: Breast incision lines can be left open to air. Abdominal wound vac removed in-office today and can now be left open to air.   -Wear abdominal binder for majority of day as tolerated. Ensure binder sits below the left breast to avoid compression.  -Avoid application of creams, lotions or ointments to surgical site. No excessive scrubbing of surgical site. Okay to shower.  - YESSICA Drains x2 removed in office today. Drain sites were cleaned with betadine and dressed with antibiotic ointment and bandage. Monitor drain sites for any green/yellow/purulent drainage, foul odor, erythema, or edema. Follow up sooner if any signs of infection develop.    Flap care:   -Continue to monitor flap for changes in general appearance, color, temperature and turgor.   -Please additionally monitor for any developing signs of infection which may include increased redness, swelling, fever/chills, green/yellow drainage, or foul odor from surgical sites or wounds.   -If any signs of infection or changes in flap appearance are to occur, please immediately contact the plastic surgery office.

## 2025-06-05 ENCOUNTER — ANESTHESIA EVENT (OUTPATIENT)
Dept: OPERATING ROOM | Facility: HOSPITAL | Age: 60
End: 2025-06-05
Payer: COMMERCIAL

## 2025-06-05 NOTE — ANESTHESIA PREPROCEDURE EVALUATION
Patient: Yvrose John    Procedure Information       Date/Time: 06/06/25 0650    Procedures:       MASTECTOMY, UNILATERAL, SIMPLE (Right)      RECONSTRUCTION, BREAST, USING FREE FLAP (Right) - combined with Dr. Maldonado    Location: [facility] OR 06 / [facility]    Surgeons: Regi Maldonado MD; Cornelius Liz MD            Relevant Problems   Cardiac   (+) Breast pain   (+) Chest pain on exertion      Hematology   (+) Anemia      ID   (+) COVID-19      GYN   (+) Malignant neoplasm of right female breast       Clinical information reviewed:     Meds               Onco-Echo 10/2023:   CONCLUSIONS:   1. Left ventricular systolic function is normal with a 65% estimated ejection fraction.   2. Spectral Doppler shows an impaired relaxation pattern of left ventricular diastolic filling.   3. Strain values are normal, which imply normal myocardial function.         NPO Detail:  No data recorded     Physical Exam    Airway  Mallampati: II  Neck ROM: full  Mouth opening: 3 or more finger widths     Cardiovascular   (+) murmur     Dental    Pulmonary - normal exam   Abdominal            Anesthesia Plan    History of general anesthesia?: yes  History of complications of general anesthesia?: no    ASA 3     general     The patient is not a current smoker.  Education provided regarding risk of obstructive sleep apnea.  intravenous induction   Postoperative pain plan includes opioids.  Trial extubation is planned.  Anesthetic plan and risks discussed with patient.  Use of blood products discussed with patient who consented to blood products.    Plan discussed with attending.

## 2025-06-05 NOTE — PROGRESS NOTES
Pharmacy Medication History Review    Yvrose John is a 60 y.o. female who is planned to be admitted for Malignant neoplasm of right female breast. Pharmacy called the patient prior to their scheduled procedure and reviewed the patient's [id number] medications for accuracy.    Medications ADDED:  none  Medications CHANGED:  none  Medications REMOVED:   none    Please review updated prior to admission medication list and comments regarding how patient may be taking medications differently by going to Admission tab --> Admission Orders --> Admit Orders / Review prior to admission medications.     Preferred pharmacy, last doses of medications, and allergies to be confirmed with patient by nursing the day of procedure.     Sources used to complete the med history include:  [facility]  Pharmacy dispense history  Patient Interview Good historian  Chart Review  Care Everywhere     Below are additional concerns with the patient's PTA list.  Patient states they stopped anastrozole 1mg. It is on hold for 2 weeks before procedure and will be held for 2 weeks after procedure    Deena Meyers [facility]  Please reach out via Secure Chat for questions

## 2025-06-06 ENCOUNTER — ANESTHESIA (OUTPATIENT)
Dept: OPERATING ROOM | Facility: HOSPITAL | Age: 60
End: 2025-06-06
Payer: COMMERCIAL

## 2025-06-06 ENCOUNTER — HOSPITAL ENCOUNTER (INPATIENT)
Facility: HOSPITAL | Age: 60
End: 2025-06-06
Attending: SURGERY | Admitting: SURGERY
Payer: COMMERCIAL

## 2025-06-06 ENCOUNTER — SURGERY (OUTPATIENT)
Age: 60
End: 2025-06-06
Payer: COMMERCIAL

## 2025-06-06 DIAGNOSIS — N64.89 ACQUIRED BREAST DEFORMITY: ICD-10-CM

## 2025-06-06 DIAGNOSIS — N64.4 BREAST PAIN: ICD-10-CM

## 2025-06-06 DIAGNOSIS — K59.03 CONSTIPATION DUE TO PAIN MEDICATION: ICD-10-CM

## 2025-06-06 DIAGNOSIS — G89.18 POST-OPERATIVE PAIN: ICD-10-CM

## 2025-06-06 DIAGNOSIS — R10.13 DYSPEPSIA: ICD-10-CM

## 2025-06-06 DIAGNOSIS — I89.0 LYMPHEDEMA OF BREAST: Primary | ICD-10-CM

## 2025-06-06 DIAGNOSIS — Z98.890 STATUS POST BREAST RECONSTRUCTION: ICD-10-CM

## 2025-06-06 LAB
ALBUMIN SERPL BCP-MCNC: 4.7 G/DL (ref 3.4–5)
ANION GAP BLDA CALCULATED.4IONS-SCNC: 11 MMO/L (ref 10–25)
ANION GAP BLDA CALCULATED.4IONS-SCNC: 12 MMO/L (ref 10–25)
ANION GAP SERPL CALC-SCNC: 16 MMOL/L (ref 10–20)
BASE EXCESS BLDA CALC-SCNC: -3.3 MMOL/L (ref -2–3)
BASE EXCESS BLDA CALC-SCNC: -4 MMOL/L (ref -2–3)
BASE EXCESS BLDA CALC-SCNC: -4.7 MMOL/L (ref -2–3)
BASE EXCESS BLDA CALC-SCNC: -6 MMOL/L (ref -2–3)
BODY TEMPERATURE: 37 DEGREES CELSIUS
BUN SERPL-MCNC: 8 MG/DL (ref 6–23)
CA-I BLDA-SCNC: 0.96 MMOL/L (ref 1.1–1.33)
CA-I BLDA-SCNC: 1.08 MMOL/L (ref 1.1–1.33)
CA-I BLDA-SCNC: 1.12 MMOL/L (ref 1.1–1.33)
CA-I BLDA-SCNC: 1.16 MMOL/L (ref 1.1–1.33)
CALCIUM SERPL-MCNC: 8.7 MG/DL (ref 8.6–10.6)
CHLORIDE BLDA-SCNC: 104 MMOL/L (ref 98–107)
CHLORIDE BLDA-SCNC: 108 MMOL/L (ref 98–107)
CHLORIDE BLDA-SCNC: 108 MMOL/L (ref 98–107)
CHLORIDE BLDA-SCNC: 112 MMOL/L (ref 98–107)
CHLORIDE SERPL-SCNC: 104 MMOL/L (ref 98–107)
CO2 SERPL-SCNC: 23 MMOL/L (ref 21–32)
CREAT SERPL-MCNC: 0.63 MG/DL (ref 0.5–1.05)
EGFRCR SERPLBLD CKD-EPI 2021: >90 ML/MIN/1.73M*2
ERYTHROCYTE [DISTWIDTH] IN BLOOD BY AUTOMATED COUNT: 12.6 % (ref 11.5–14.5)
GLUCOSE BLDA-MCNC: 122 MG/DL (ref 74–99)
GLUCOSE BLDA-MCNC: 142 MG/DL (ref 74–99)
GLUCOSE BLDA-MCNC: 148 MG/DL (ref 74–99)
GLUCOSE BLDA-MCNC: 155 MG/DL (ref 74–99)
GLUCOSE SERPL-MCNC: 163 MG/DL (ref 74–99)
HCO3 BLDA-SCNC: 18.6 MMOL/L (ref 22–26)
HCO3 BLDA-SCNC: 20.4 MMOL/L (ref 22–26)
HCO3 BLDA-SCNC: 20.8 MMOL/L (ref 22–26)
HCO3 BLDA-SCNC: 22.1 MMOL/L (ref 22–26)
HCT VFR BLD AUTO: 31.1 % (ref 36–46)
HCT VFR BLD EST: 31 % (ref 36–46)
HCT VFR BLD EST: 34 % (ref 36–46)
HCT VFR BLD EST: 34 % (ref 36–46)
HCT VFR BLD EST: 36 % (ref 36–46)
HGB BLD-MCNC: 10.4 G/DL (ref 12–16)
HGB BLDA-MCNC: 10.4 G/DL (ref 12–16)
HGB BLDA-MCNC: 11.3 G/DL (ref 12–16)
HGB BLDA-MCNC: 11.3 G/DL (ref 12–16)
HGB BLDA-MCNC: 12.1 G/DL (ref 12–16)
INHALED O2 CONCENTRATION: 40 %
INHALED O2 CONCENTRATION: 40 %
INHALED O2 CONCENTRATION: 50 %
INHALED O2 CONCENTRATION: 60 %
LACTATE BLDA-SCNC: 1.4 MMOL/L (ref 0.4–2)
LACTATE BLDA-SCNC: 1.8 MMOL/L (ref 0.4–2)
LACTATE BLDA-SCNC: 3.3 MMOL/L (ref 0.4–2)
LACTATE BLDA-SCNC: 3.9 MMOL/L (ref 0.4–2)
MAGNESIUM SERPL-MCNC: 1.52 MG/DL (ref 1.6–2.4)
MCH RBC QN AUTO: 32.7 PG (ref 26–34)
MCHC RBC AUTO-ENTMCNC: 33.4 G/DL (ref 32–36)
MCV RBC AUTO: 98 FL (ref 80–100)
NRBC BLD-RTO: 0 /100 WBCS (ref 0–0)
OXYHGB MFR BLDA: 96.7 % (ref 94–98)
OXYHGB MFR BLDA: 96.8 % (ref 94–98)
OXYHGB MFR BLDA: 97.1 % (ref 94–98)
OXYHGB MFR BLDA: 97.3 % (ref 94–98)
PCO2 BLDA: 33 MM HG (ref 38–42)
PCO2 BLDA: 36 MM HG (ref 38–42)
PCO2 BLDA: 37 MM HG (ref 38–42)
PCO2 BLDA: 40 MM HG (ref 38–42)
PH BLDA: 7.35 PH (ref 7.38–7.42)
PH BLDA: 7.35 PH (ref 7.38–7.42)
PH BLDA: 7.36 PH (ref 7.38–7.42)
PH BLDA: 7.37 PH (ref 7.38–7.42)
PHOSPHATE SERPL-MCNC: 2.8 MG/DL (ref 2.5–4.9)
PLATELET # BLD AUTO: 135 X10*3/UL (ref 150–450)
PO2 BLDA: 108 MM HG (ref 85–95)
PO2 BLDA: 112 MM HG (ref 85–95)
PO2 BLDA: 151 MM HG (ref 85–95)
PO2 BLDA: 161 MM HG (ref 85–95)
POTASSIUM BLDA-SCNC: 2.6 MMOL/L (ref 3.5–5.3)
POTASSIUM BLDA-SCNC: 2.9 MMOL/L (ref 3.5–5.3)
POTASSIUM BLDA-SCNC: 3.2 MMOL/L (ref 3.5–5.3)
POTASSIUM BLDA-SCNC: 4.2 MMOL/L (ref 3.5–5.3)
POTASSIUM SERPL-SCNC: 3.6 MMOL/L (ref 3.5–5.3)
RBC # BLD AUTO: 3.18 X10*6/UL (ref 4–5.2)
SAO2 % BLDA: 100 % (ref 94–100)
SAO2 % BLDA: 99 % (ref 94–100)
SODIUM BLDA-SCNC: 134 MMOL/L (ref 136–145)
SODIUM BLDA-SCNC: 137 MMOL/L (ref 136–145)
SODIUM BLDA-SCNC: 138 MMOL/L (ref 136–145)
SODIUM BLDA-SCNC: 139 MMOL/L (ref 136–145)
SODIUM SERPL-SCNC: 139 MMOL/L (ref 136–145)
WBC # BLD AUTO: 13.4 X10*3/UL (ref 4.4–11.3)

## 2025-06-06 PROCEDURE — 88305 TISSUE EXAM BY PATHOLOGIST: CPT | Mod: TC,SUR,WESLAB | Performed by: SURGERY

## 2025-06-06 PROCEDURE — 99231 SBSQ HOSP IP/OBS SF/LOW 25: CPT | Performed by: PHYSICIAN ASSISTANT

## 2025-06-06 PROCEDURE — 0KXL0Z6 TRANSFER LEFT ABDOMEN MUSCLE, TRANSVERSE RECTUS ABDOMINIS MYOCUTANEOUS FLAP, OPEN APPROACH: ICD-10-PCS

## 2025-06-06 PROCEDURE — 85027 COMPLETE CBC AUTOMATED: CPT | Performed by: PHYSICIAN ASSISTANT

## 2025-06-06 PROCEDURE — 3600000009 HC OR TIME - EACH INCREMENTAL 1 MINUTE - PROCEDURE LEVEL FOUR

## 2025-06-06 PROCEDURE — 2500000004 HC RX 250 GENERAL PHARMACY W/ HCPCS (ALT 636 FOR OP/ED): Performed by: PHYSICIAN ASSISTANT

## 2025-06-06 PROCEDURE — 1170000001 HC PRIVATE ONCOLOGY ROOM DAILY

## 2025-06-06 PROCEDURE — 83735 ASSAY OF MAGNESIUM: CPT | Performed by: PHYSICIAN ASSISTANT

## 2025-06-06 PROCEDURE — 2500000004 HC RX 250 GENERAL PHARMACY W/ HCPCS (ALT 636 FOR OP/ED): Performed by: STUDENT IN AN ORGANIZED HEALTH CARE EDUCATION/TRAINING PROGRAM

## 2025-06-06 PROCEDURE — 37799 UNLISTED PX VASCULAR SURGERY: CPT

## 2025-06-06 PROCEDURE — 84132 ASSAY OF SERUM POTASSIUM: CPT | Performed by: NURSE ANESTHETIST, CERTIFIED REGISTERED

## 2025-06-06 PROCEDURE — C1781 MESH (IMPLANTABLE): HCPCS

## 2025-06-06 PROCEDURE — 2500000004 HC RX 250 GENERAL PHARMACY W/ HCPCS (ALT 636 FOR OP/ED)

## 2025-06-06 PROCEDURE — 3700000002 HC GENERAL ANESTHESIA TIME - EACH INCREMENTAL 1 MINUTE

## 2025-06-06 PROCEDURE — 2500000005 HC RX 250 GENERAL PHARMACY W/O HCPCS: Performed by: NURSE ANESTHETIST, CERTIFIED REGISTERED

## 2025-06-06 PROCEDURE — 82435 ASSAY OF BLOOD CHLORIDE: CPT | Performed by: NURSE ANESTHETIST, CERTIFIED REGISTERED

## 2025-06-06 PROCEDURE — 3700000001 HC GENERAL ANESTHESIA TIME - INITIAL BASE CHARGE

## 2025-06-06 PROCEDURE — 82330 ASSAY OF CALCIUM: CPT

## 2025-06-06 PROCEDURE — 88304 TISSUE EXAM BY PATHOLOGIST: CPT | Mod: TC,SUR,WESLAB

## 2025-06-06 PROCEDURE — 88342 IMHCHEM/IMCYTCHM 1ST ANTB: CPT | Performed by: STUDENT IN AN ORGANIZED HEALTH CARE EDUCATION/TRAINING PROGRAM

## 2025-06-06 PROCEDURE — 2500000004 HC RX 250 GENERAL PHARMACY W/ HCPCS (ALT 636 FOR OP/ED): Performed by: NURSE ANESTHETIST, CERTIFIED REGISTERED

## 2025-06-06 PROCEDURE — 84132 ASSAY OF SERUM POTASSIUM: CPT

## 2025-06-06 PROCEDURE — 19303 MAST SIMPLE COMPLETE: CPT | Performed by: SURGERY

## 2025-06-06 PROCEDURE — 82435 ASSAY OF BLOOD CHLORIDE: CPT | Performed by: PHYSICIAN ASSISTANT

## 2025-06-06 PROCEDURE — 3600000004 HC OR TIME - INITIAL BASE CHARGE - PROCEDURE LEVEL FOUR

## 2025-06-06 PROCEDURE — 2780000003 HC OR 278 NO HCPCS

## 2025-06-06 PROCEDURE — 96372 THER/PROPH/DIAG INJ SC/IM: CPT | Performed by: PHYSICIAN ASSISTANT

## 2025-06-06 PROCEDURE — 38530 BIOPSY/REMOVAL LYMPH NODES: CPT

## 2025-06-06 PROCEDURE — 07B80ZX EXCISION OF RIGHT INTERNAL MAMMARY LYMPHATIC, OPEN APPROACH, DIAGNOSTIC: ICD-10-PCS

## 2025-06-06 PROCEDURE — 19364 BRST RCNSTJ FREE FLAP: CPT | Performed by: SURGERY

## 2025-06-06 PROCEDURE — 88305 TISSUE EXAM BY PATHOLOGIST: CPT | Performed by: STUDENT IN AN ORGANIZED HEALTH CARE EDUCATION/TRAINING PROGRAM

## 2025-06-06 PROCEDURE — 7100000002 HC RECOVERY ROOM TIME - EACH INCREMENTAL 1 MINUTE

## 2025-06-06 PROCEDURE — 15860 IV NJX TST VASC FLO FLAP/GRF: CPT

## 2025-06-06 PROCEDURE — 2500000001 HC RX 250 WO HCPCS SELF ADMINISTERED DRUGS (ALT 637 FOR MEDICARE OP): Performed by: PHYSICIAN ASSISTANT

## 2025-06-06 PROCEDURE — 80069 RENAL FUNCTION PANEL: CPT

## 2025-06-06 PROCEDURE — 38530 BIOPSY/REMOVAL LYMPH NODES: CPT | Performed by: SURGERY

## 2025-06-06 PROCEDURE — 84132 ASSAY OF SERUM POTASSIUM: CPT | Performed by: PHYSICIAN ASSISTANT

## 2025-06-06 PROCEDURE — 0KXF0Z7 TRANSFER RIGHT TRUNK MUSCLE, DEEP INFERIOR EPIGASTRIC ARTERY PERFORATOR FLAP, OPEN APPROACH: ICD-10-PCS

## 2025-06-06 PROCEDURE — 0HTT0ZZ RESECTION OF RIGHT BREAST, OPEN APPROACH: ICD-10-PCS | Performed by: SURGERY

## 2025-06-06 PROCEDURE — 19364 BRST RCNSTJ FREE FLAP: CPT

## 2025-06-06 PROCEDURE — 15002 WOUND PREP TRK/ARM/LEG: CPT

## 2025-06-06 PROCEDURE — 88307 TISSUE EXAM BY PATHOLOGIST: CPT | Mod: TC,SUR,WESLAB | Performed by: SURGERY

## 2025-06-06 PROCEDURE — 99223 1ST HOSP IP/OBS HIGH 75: CPT

## 2025-06-06 PROCEDURE — 2720000007 HC OR 272 NO HCPCS

## 2025-06-06 PROCEDURE — 2500000005 HC RX 250 GENERAL PHARMACY W/O HCPCS

## 2025-06-06 PROCEDURE — 2500000004 HC RX 250 GENERAL PHARMACY W/ HCPCS (ALT 636 FOR OP/ED): Performed by: ANESTHESIOLOGY

## 2025-06-06 PROCEDURE — P9045 ALBUMIN (HUMAN), 5%, 250 ML: HCPCS | Mod: JZ,TB | Performed by: NURSE ANESTHETIST, CERTIFIED REGISTERED

## 2025-06-06 PROCEDURE — 2500000004 HC RX 250 GENERAL PHARMACY W/ HCPCS (ALT 636 FOR OP/ED): Mod: TB

## 2025-06-06 PROCEDURE — 2500000002 HC RX 250 W HCPCS SELF ADMINISTERED DRUGS (ALT 637 FOR MEDICARE OP, ALT 636 FOR OP/ED): Performed by: PHYSICIAN ASSISTANT

## 2025-06-06 PROCEDURE — 7100000001 HC RECOVERY ROOM TIME - INITIAL BASE CHARGE

## 2025-06-06 PROCEDURE — 0KXG0Z7 TRANSFER LEFT TRUNK MUSCLE, DEEP INFERIOR EPIGASTRIC ARTERY PERFORATOR FLAP, OPEN APPROACH: ICD-10-PCS

## 2025-06-06 DEVICE — IMPLANTABLE DEVICE: Type: IMPLANTABLE DEVICE | Site: ABDOMEN | Status: FUNCTIONAL

## 2025-06-06 RX ORDER — ROPIVACAINE IN 0.9% SOD CHL/PF 0.2 %
10 PLASTIC BAG, INJECTION (ML) EPIDURAL CONTINUOUS
Status: DISCONTINUED | OUTPATIENT
Start: 2025-06-06 | End: 2025-06-08

## 2025-06-06 RX ORDER — ONDANSETRON 4 MG/1
4 TABLET, FILM COATED ORAL EVERY 8 HOURS PRN
OUTPATIENT
Start: 2025-06-06

## 2025-06-06 RX ORDER — DOCUSATE SODIUM 100 MG/1
100 CAPSULE, LIQUID FILLED ORAL 2 TIMES DAILY
OUTPATIENT
Start: 2025-06-06

## 2025-06-06 RX ORDER — HYDRALAZINE HYDROCHLORIDE 20 MG/ML
5 INJECTION INTRAMUSCULAR; INTRAVENOUS EVERY 30 MIN PRN
Status: DISCONTINUED | OUTPATIENT
Start: 2025-06-06 | End: 2025-06-06 | Stop reason: HOSPADM

## 2025-06-06 RX ORDER — SODIUM CHLORIDE, SODIUM LACTATE, POTASSIUM CHLORIDE, CALCIUM CHLORIDE 600; 310; 30; 20 MG/100ML; MG/100ML; MG/100ML; MG/100ML
100 INJECTION, SOLUTION INTRAVENOUS CONTINUOUS
Status: ACTIVE | OUTPATIENT
Start: 2025-06-06 | End: 2025-06-06

## 2025-06-06 RX ORDER — HEPARIN SODIUM 5000 [USP'U]/ML
5000 INJECTION, SOLUTION INTRAVENOUS; SUBCUTANEOUS EVERY 8 HOURS
Status: DISPENSED | OUTPATIENT
Start: 2025-06-07

## 2025-06-06 RX ORDER — POTASSIUM CHLORIDE 14.9 MG/ML
INJECTION INTRAVENOUS AS NEEDED
Status: DISCONTINUED | OUTPATIENT
Start: 2025-06-06 | End: 2025-06-06

## 2025-06-06 RX ORDER — ACETAMINOPHEN 10 MG/ML
INJECTION, SOLUTION INTRAVENOUS AS NEEDED
Status: DISCONTINUED | OUTPATIENT
Start: 2025-06-06 | End: 2025-06-06

## 2025-06-06 RX ORDER — NALOXONE HYDROCHLORIDE 0.4 MG/ML
0.2 INJECTION, SOLUTION INTRAMUSCULAR; INTRAVENOUS; SUBCUTANEOUS EVERY 5 MIN PRN
OUTPATIENT
Start: 2025-06-06

## 2025-06-06 RX ORDER — PAPAVERINE HYDROCHLORIDE 30 MG/ML
INJECTION INTRAMUSCULAR; INTRAVENOUS AS NEEDED
Status: DISCONTINUED | OUTPATIENT
Start: 2025-06-06 | End: 2025-06-06 | Stop reason: HOSPADM

## 2025-06-06 RX ORDER — PROPOFOL 10 MG/ML
INJECTION, EMULSION INTRAVENOUS AS NEEDED
Status: DISCONTINUED | OUTPATIENT
Start: 2025-06-06 | End: 2025-06-06

## 2025-06-06 RX ORDER — ACETAMINOPHEN 325 MG/1
975 TABLET ORAL EVERY 8 HOURS SCHEDULED
Status: DISPENSED | OUTPATIENT
Start: 2025-06-06

## 2025-06-06 RX ORDER — ALBUTEROL SULFATE 0.83 MG/ML
2.5 SOLUTION RESPIRATORY (INHALATION) ONCE AS NEEDED
Status: DISCONTINUED | OUTPATIENT
Start: 2025-06-06 | End: 2025-06-06 | Stop reason: HOSPADM

## 2025-06-06 RX ORDER — ONDANSETRON HYDROCHLORIDE 2 MG/ML
INJECTION, SOLUTION INTRAVENOUS AS NEEDED
Status: DISCONTINUED | OUTPATIENT
Start: 2025-06-06 | End: 2025-06-06

## 2025-06-06 RX ORDER — SODIUM CHLORIDE 9 MG/ML
100 INJECTION, SOLUTION INTRAVENOUS CONTINUOUS
OUTPATIENT
Start: 2025-06-06 | End: 2025-06-07

## 2025-06-06 RX ORDER — ALBUMIN HUMAN 50 G/1000ML
SOLUTION INTRAVENOUS AS NEEDED
Status: DISCONTINUED | OUTPATIENT
Start: 2025-06-06 | End: 2025-06-06

## 2025-06-06 RX ORDER — CEFAZOLIN 1 G/1
INJECTION, POWDER, FOR SOLUTION INTRAVENOUS AS NEEDED
Status: DISCONTINUED | OUTPATIENT
Start: 2025-06-06 | End: 2025-06-06

## 2025-06-06 RX ORDER — GLYCOPYRROLATE 0.2 MG/ML
INJECTION INTRAMUSCULAR; INTRAVENOUS AS NEEDED
Status: DISCONTINUED | OUTPATIENT
Start: 2025-06-06 | End: 2025-06-06

## 2025-06-06 RX ORDER — METHOCARBAMOL 500 MG/1
500 TABLET, FILM COATED ORAL EVERY 6 HOURS SCHEDULED
Status: DISCONTINUED | OUTPATIENT
Start: 2025-06-07 | End: 2025-06-06

## 2025-06-06 RX ORDER — KETOROLAC TROMETHAMINE 15 MG/ML
15 INJECTION, SOLUTION INTRAMUSCULAR; INTRAVENOUS EVERY 6 HOURS PRN
Status: DISCONTINUED | OUTPATIENT
Start: 2025-06-07 | End: 2025-06-07

## 2025-06-06 RX ORDER — HEPARIN SODIUM 5000 [USP'U]/ML
INJECTION, SOLUTION INTRAVENOUS; SUBCUTANEOUS AS NEEDED
Status: DISCONTINUED | OUTPATIENT
Start: 2025-06-06 | End: 2025-06-06

## 2025-06-06 RX ORDER — SODIUM CHLORIDE 0.9 G/100ML
INJECTION, SOLUTION IRRIGATION AS NEEDED
Status: DISCONTINUED | OUTPATIENT
Start: 2025-06-06 | End: 2025-06-06 | Stop reason: HOSPADM

## 2025-06-06 RX ORDER — CEFAZOLIN SODIUM 2 G/100ML
2 INJECTION, SOLUTION INTRAVENOUS EVERY 8 HOURS
OUTPATIENT
Start: 2025-06-06 | End: 2025-06-07

## 2025-06-06 RX ORDER — MIDAZOLAM HYDROCHLORIDE 1 MG/ML
INJECTION INTRAMUSCULAR; INTRAVENOUS AS NEEDED
Status: DISCONTINUED | OUTPATIENT
Start: 2025-06-06 | End: 2025-06-06

## 2025-06-06 RX ORDER — GABAPENTIN 300 MG/1
300 CAPSULE ORAL EVERY 8 HOURS SCHEDULED
OUTPATIENT
Start: 2025-06-06

## 2025-06-06 RX ORDER — GABAPENTIN 300 MG/1
300 CAPSULE ORAL EVERY 8 HOURS SCHEDULED
Status: DISCONTINUED | OUTPATIENT
Start: 2025-06-06 | End: 2025-06-06

## 2025-06-06 RX ORDER — ANASTROZOLE 1 MG/1
1 TABLET ORAL DAILY
Status: ACTIVE | OUTPATIENT
Start: 2025-06-07

## 2025-06-06 RX ORDER — LIDOCAINE HYDROCHLORIDE 20 MG/ML
INJECTION, SOLUTION INFILTRATION; PERINEURAL AS NEEDED
Status: DISCONTINUED | OUTPATIENT
Start: 2025-06-06 | End: 2025-06-06 | Stop reason: HOSPADM

## 2025-06-06 RX ORDER — HEPARIN SODIUM 5000 [USP'U]/ML
5000 INJECTION, SOLUTION INTRAVENOUS; SUBCUTANEOUS ONCE
Status: COMPLETED | OUTPATIENT
Start: 2025-06-06 | End: 2025-06-06

## 2025-06-06 RX ORDER — PANTOPRAZOLE SODIUM 40 MG/1
40 TABLET, DELAYED RELEASE ORAL
OUTPATIENT
Start: 2025-06-07

## 2025-06-06 RX ORDER — APREPITANT 40 MG/1
40 CAPSULE ORAL ONCE
Status: COMPLETED | OUTPATIENT
Start: 2025-06-06 | End: 2025-06-06

## 2025-06-06 RX ORDER — NITROGLYCERIN 20 MG/G
0.5 OINTMENT TOPICAL DAILY
Status: DISPENSED | OUTPATIENT
Start: 2025-06-07

## 2025-06-06 RX ORDER — METOCLOPRAMIDE HYDROCHLORIDE 5 MG/ML
10 INJECTION INTRAMUSCULAR; INTRAVENOUS ONCE AS NEEDED
Status: COMPLETED | OUTPATIENT
Start: 2025-06-06 | End: 2025-06-06

## 2025-06-06 RX ORDER — ACETAMINOPHEN 325 MG/1
975 TABLET ORAL ONCE
Status: COMPLETED | OUTPATIENT
Start: 2025-06-06 | End: 2025-06-06

## 2025-06-06 RX ORDER — NITROGLYCERIN 20 MG/G
OINTMENT TOPICAL AS NEEDED
Status: DISCONTINUED | OUTPATIENT
Start: 2025-06-06 | End: 2025-06-06 | Stop reason: HOSPADM

## 2025-06-06 RX ORDER — ROPIVACAINE HYDROCHLORIDE 5 MG/ML
INJECTION, SOLUTION EPIDURAL; INFILTRATION; PERINEURAL
Status: COMPLETED | OUTPATIENT
Start: 2025-06-06 | End: 2025-06-06

## 2025-06-06 RX ORDER — CEFAZOLIN SODIUM 2 G/100ML
2 INJECTION, SOLUTION INTRAVENOUS EVERY 8 HOURS
Status: DISCONTINUED | OUTPATIENT
Start: 2025-06-07 | End: 2025-06-06

## 2025-06-06 RX ORDER — ROPIVACAINE IN 0.9% SOD CHL/PF 0.2 %
7 PLASTIC BAG, INJECTION (ML) EPIDURAL CONTINUOUS
Status: CANCELLED | OUTPATIENT
Start: 2025-06-06 | Stop reason: ALTCHOICE

## 2025-06-06 RX ORDER — LIDOCAINE HYDROCHLORIDE 20 MG/ML
INJECTION, SOLUTION INFILTRATION; PERINEURAL AS NEEDED
Status: DISCONTINUED | OUTPATIENT
Start: 2025-06-06 | End: 2025-06-06

## 2025-06-06 RX ORDER — ESMOLOL HYDROCHLORIDE 10 MG/ML
INJECTION INTRAVENOUS AS NEEDED
Status: DISCONTINUED | OUTPATIENT
Start: 2025-06-06 | End: 2025-06-06

## 2025-06-06 RX ORDER — METHOCARBAMOL 500 MG/1
500 TABLET, FILM COATED ORAL EVERY 6 HOURS SCHEDULED
OUTPATIENT
Start: 2025-06-06

## 2025-06-06 RX ORDER — SODIUM CHLORIDE, SODIUM LACTATE, POTASSIUM CHLORIDE, CALCIUM CHLORIDE 600; 310; 30; 20 MG/100ML; MG/100ML; MG/100ML; MG/100ML
INJECTION, SOLUTION INTRAVENOUS CONTINUOUS PRN
Status: DISCONTINUED | OUTPATIENT
Start: 2025-06-06 | End: 2025-06-06

## 2025-06-06 RX ORDER — ROCURONIUM BROMIDE 10 MG/ML
INJECTION, SOLUTION INTRAVENOUS AS NEEDED
Status: DISCONTINUED | OUTPATIENT
Start: 2025-06-06 | End: 2025-06-06

## 2025-06-06 RX ORDER — FENTANYL CITRATE 50 UG/ML
INJECTION, SOLUTION INTRAMUSCULAR; INTRAVENOUS AS NEEDED
Status: DISCONTINUED | OUTPATIENT
Start: 2025-06-06 | End: 2025-06-06

## 2025-06-06 RX ORDER — ADHESIVE BANDAGE
30 BANDAGE TOPICAL DAILY PRN
OUTPATIENT
Start: 2025-06-06

## 2025-06-06 RX ORDER — LABETALOL HYDROCHLORIDE 5 MG/ML
5 INJECTION, SOLUTION INTRAVENOUS ONCE AS NEEDED
Status: DISCONTINUED | OUTPATIENT
Start: 2025-06-06 | End: 2025-06-06 | Stop reason: HOSPADM

## 2025-06-06 RX ORDER — DROPERIDOL 2.5 MG/ML
0.62 INJECTION, SOLUTION INTRAMUSCULAR; INTRAVENOUS ONCE AS NEEDED
Status: DISCONTINUED | OUTPATIENT
Start: 2025-06-06 | End: 2025-06-06 | Stop reason: HOSPADM

## 2025-06-06 RX ORDER — HYDROMORPHONE HYDROCHLORIDE 0.2 MG/ML
0.2 INJECTION INTRAMUSCULAR; INTRAVENOUS; SUBCUTANEOUS EVERY 5 MIN PRN
Status: DISCONTINUED | OUTPATIENT
Start: 2025-06-06 | End: 2025-06-06 | Stop reason: HOSPADM

## 2025-06-06 RX ORDER — NORETHINDRONE AND ETHINYL ESTRADIOL 0.5-0.035
KIT ORAL AS NEEDED
Status: DISCONTINUED | OUTPATIENT
Start: 2025-06-06 | End: 2025-06-06

## 2025-06-06 RX ORDER — CALCIUM CHLORIDE INJECTION 100 MG/ML
INJECTION, SOLUTION INTRAVENOUS AS NEEDED
Status: DISCONTINUED | OUTPATIENT
Start: 2025-06-06 | End: 2025-06-06

## 2025-06-06 RX ORDER — PANTOPRAZOLE SODIUM 40 MG/10ML
40 INJECTION, POWDER, LYOPHILIZED, FOR SOLUTION INTRAVENOUS
OUTPATIENT
Start: 2025-06-07

## 2025-06-06 RX ORDER — METHOCARBAMOL 100 MG/ML
1000 INJECTION, SOLUTION INTRAMUSCULAR; INTRAVENOUS ONCE
Status: COMPLETED | OUTPATIENT
Start: 2025-06-06 | End: 2025-06-06

## 2025-06-06 RX ORDER — KETOROLAC TROMETHAMINE 30 MG/ML
15 INJECTION, SOLUTION INTRAMUSCULAR; INTRAVENOUS EVERY 6 HOURS SCHEDULED
Status: DISCONTINUED | OUTPATIENT
Start: 2025-06-07 | End: 2025-06-07

## 2025-06-06 RX ORDER — OXYCODONE HYDROCHLORIDE 5 MG/1
5 TABLET ORAL EVERY 4 HOURS PRN
Status: DISCONTINUED | OUTPATIENT
Start: 2025-06-06 | End: 2025-06-06 | Stop reason: HOSPADM

## 2025-06-06 RX ORDER — NITROGLYCERIN 20 MG/G
0.5 OINTMENT TOPICAL
Status: DISCONTINUED | OUTPATIENT
Start: 2025-06-06 | End: 2025-06-06

## 2025-06-06 RX ORDER — HYDROMORPHONE HYDROCHLORIDE 1 MG/ML
INJECTION, SOLUTION INTRAMUSCULAR; INTRAVENOUS; SUBCUTANEOUS AS NEEDED
Status: DISCONTINUED | OUTPATIENT
Start: 2025-06-06 | End: 2025-06-06

## 2025-06-06 RX ORDER — METHOCARBAMOL 100 MG/ML
500 INJECTION, SOLUTION INTRAMUSCULAR; INTRAVENOUS EVERY 8 HOURS
Status: DISCONTINUED | OUTPATIENT
Start: 2025-06-07 | End: 2025-06-07

## 2025-06-06 RX ORDER — KETOROLAC TROMETHAMINE 30 MG/ML
INJECTION, SOLUTION INTRAMUSCULAR; INTRAVENOUS AS NEEDED
Status: DISCONTINUED | OUTPATIENT
Start: 2025-06-06 | End: 2025-06-06

## 2025-06-06 RX ORDER — BACITRACIN 500 [USP'U]/G
OINTMENT TOPICAL AS NEEDED
Status: DISCONTINUED | OUTPATIENT
Start: 2025-06-06 | End: 2025-06-06 | Stop reason: HOSPADM

## 2025-06-06 RX ORDER — LIDOCAINE HYDROCHLORIDE 10 MG/ML
0.1 INJECTION, SOLUTION INFILTRATION; PERINEURAL ONCE
Status: DISCONTINUED | OUTPATIENT
Start: 2025-06-06 | End: 2025-06-06 | Stop reason: HOSPADM

## 2025-06-06 RX ORDER — ACETAMINOPHEN 10 MG/ML
1000 INJECTION, SOLUTION INTRAVENOUS ONCE
OUTPATIENT
Start: 2025-06-06 | End: 2025-06-06

## 2025-06-06 RX ORDER — INDOCYANINE GREEN AND WATER 25 MG
KIT INJECTION AS NEEDED
Status: DISCONTINUED | OUTPATIENT
Start: 2025-06-06 | End: 2025-06-06

## 2025-06-06 RX ORDER — DOCUSATE SODIUM 100 MG/1
100 CAPSULE, LIQUID FILLED ORAL 2 TIMES DAILY
Status: DISCONTINUED | OUTPATIENT
Start: 2025-06-07 | End: 2025-06-06

## 2025-06-06 RX ORDER — ONDANSETRON HYDROCHLORIDE 2 MG/ML
4 INJECTION, SOLUTION INTRAVENOUS EVERY 8 HOURS PRN
OUTPATIENT
Start: 2025-06-06

## 2025-06-06 RX ADMIN — SUGAMMADEX 200 MG: 100 INJECTION, SOLUTION INTRAVENOUS at 20:39

## 2025-06-06 RX ADMIN — CALCIUM CHLORIDE 0.5 G: 100 INJECTION INTRAVENOUS; INTRAVENTRICULAR at 14:50

## 2025-06-06 RX ADMIN — ROCURONIUM BROMIDE 10 MG: 10 INJECTION INTRAVENOUS at 17:48

## 2025-06-06 RX ADMIN — ESMOLOL HYDROCHLORIDE 10 MG: 10 INJECTION, SOLUTION INTRAVENOUS at 14:20

## 2025-06-06 RX ADMIN — NITROGLYCERIN 2 INCH: 20 OINTMENT TOPICAL at 20:30

## 2025-06-06 RX ADMIN — CEFAZOLIN 2 G: 1 INJECTION, POWDER, FOR SOLUTION INTRAMUSCULAR; INTRAVENOUS at 19:59

## 2025-06-06 RX ADMIN — ALBUMIN HUMAN 250 ML: 0.05 INJECTION, SOLUTION INTRAVENOUS at 13:57

## 2025-06-06 RX ADMIN — DEXAMETHASONE SODIUM PHOSPHATE 8 MG: 4 INJECTION INTRA-ARTICULAR; INTRALESIONAL; INTRAMUSCULAR; INTRAVENOUS; SOFT TISSUE at 08:13

## 2025-06-06 RX ADMIN — Medication 7 ML/HR: at 19:29

## 2025-06-06 RX ADMIN — FENTANYL CITRATE 50 MCG: 50 INJECTION, SOLUTION INTRAMUSCULAR; INTRAVENOUS at 07:46

## 2025-06-06 RX ADMIN — ROCURONIUM BROMIDE 30 MG: 10 INJECTION INTRAVENOUS at 10:48

## 2025-06-06 RX ADMIN — CEFAZOLIN 2 G: 1 INJECTION, POWDER, FOR SOLUTION INTRAMUSCULAR; INTRAVENOUS at 12:10

## 2025-06-06 RX ADMIN — POTASSIUM CHLORIDE 20 MEQ: 14.9 INJECTION, SOLUTION INTRAVENOUS at 19:42

## 2025-06-06 RX ADMIN — PROPOFOL 30 MG: 10 INJECTION, EMULSION INTRAVENOUS at 18:55

## 2025-06-06 RX ADMIN — ROCURONIUM BROMIDE 20 MG: 10 INJECTION INTRAVENOUS at 17:05

## 2025-06-06 RX ADMIN — ROCURONIUM BROMIDE 30 MG: 10 INJECTION INTRAVENOUS at 11:31

## 2025-06-06 RX ADMIN — ROCURONIUM BROMIDE 10 MG: 10 INJECTION INTRAVENOUS at 18:49

## 2025-06-06 RX ADMIN — EPHEDRINE SULFATE 5 MG: 50 INJECTION INTRAVENOUS at 11:41

## 2025-06-06 RX ADMIN — ONDANSETRON 4 MG: 2 INJECTION INTRAMUSCULAR; INTRAVENOUS at 20:23

## 2025-06-06 RX ADMIN — ROCURONIUM BROMIDE 30 MG: 10 INJECTION INTRAVENOUS at 14:42

## 2025-06-06 RX ADMIN — EPHEDRINE SULFATE 5 MG: 50 INJECTION INTRAVENOUS at 12:02

## 2025-06-06 RX ADMIN — GLYCOPYRROLATE 0.2 MG: 0.2 INJECTION INTRAMUSCULAR; INTRAVENOUS at 11:49

## 2025-06-06 RX ADMIN — PROPOFOL 30 MCG/KG/MIN: 10 INJECTION, EMULSION INTRAVENOUS at 08:08

## 2025-06-06 RX ADMIN — ROCURONIUM BROMIDE 20 MG: 10 INJECTION INTRAVENOUS at 14:21

## 2025-06-06 RX ADMIN — PROPOFOL 40 MG: 10 INJECTION, EMULSION INTRAVENOUS at 08:19

## 2025-06-06 RX ADMIN — ALBUMIN HUMAN 250 ML: 0.05 INJECTION, SOLUTION INTRAVENOUS at 11:44

## 2025-06-06 RX ADMIN — HEPARIN SODIUM 25000 UNITS: 5000 INJECTION INTRAVENOUS; SUBCUTANEOUS at 09:32

## 2025-06-06 RX ADMIN — ALBUMIN HUMAN 250 ML: 0.05 INJECTION, SOLUTION INTRAVENOUS at 10:17

## 2025-06-06 RX ADMIN — ROCURONIUM BROMIDE 30 MG: 10 INJECTION INTRAVENOUS at 08:58

## 2025-06-06 RX ADMIN — SODIUM CHLORIDE, POTASSIUM CHLORIDE, SODIUM LACTATE AND CALCIUM CHLORIDE 100 ML/HR: 600; 310; 30; 20 INJECTION, SOLUTION INTRAVENOUS at 21:30

## 2025-06-06 RX ADMIN — CEFAZOLIN 2 G: 1 INJECTION, POWDER, FOR SOLUTION INTRAMUSCULAR; INTRAVENOUS at 16:06

## 2025-06-06 RX ADMIN — KETOROLAC TROMETHAMINE 30 MG: 30 INJECTION, SOLUTION INTRAMUSCULAR; INTRAVENOUS at 20:29

## 2025-06-06 RX ADMIN — INDOCYANINE GREEN AND WATER 3 ML: KIT at 17:35

## 2025-06-06 RX ADMIN — EPHEDRINE SULFATE 5 MG: 50 INJECTION INTRAVENOUS at 09:51

## 2025-06-06 RX ADMIN — ALBUMIN HUMAN 250 ML: 0.05 INJECTION, SOLUTION INTRAVENOUS at 19:42

## 2025-06-06 RX ADMIN — PROPOFOL 160 MG: 10 INJECTION, EMULSION INTRAVENOUS at 07:47

## 2025-06-06 RX ADMIN — POTASSIUM CHLORIDE 20 MEQ: 14.9 INJECTION, SOLUTION INTRAVENOUS at 14:56

## 2025-06-06 RX ADMIN — HYDROMORPHONE HYDROCHLORIDE 0.25 MG: 1 INJECTION, SOLUTION INTRAMUSCULAR; INTRAVENOUS; SUBCUTANEOUS at 18:56

## 2025-06-06 RX ADMIN — INDOCYANINE GREEN AND WATER 3 ML: KIT at 20:13

## 2025-06-06 RX ADMIN — ACETAMINOPHEN 1000 MG: 10 INJECTION, SOLUTION INTRAVENOUS at 21:32

## 2025-06-06 RX ADMIN — EPHEDRINE SULFATE 5 MG: 50 INJECTION INTRAVENOUS at 13:45

## 2025-06-06 RX ADMIN — ROCURONIUM BROMIDE 70 MG: 10 INJECTION INTRAVENOUS at 07:48

## 2025-06-06 RX ADMIN — EPHEDRINE SULFATE 7.5 MG: 50 INJECTION INTRAVENOUS at 13:54

## 2025-06-06 RX ADMIN — ROCURONIUM BROMIDE 10 MG: 10 INJECTION INTRAVENOUS at 16:23

## 2025-06-06 RX ADMIN — ESMOLOL HYDROCHLORIDE 10 MG: 10 INJECTION, SOLUTION INTRAVENOUS at 16:59

## 2025-06-06 RX ADMIN — ROCURONIUM BROMIDE 30 MG: 10 INJECTION INTRAVENOUS at 13:16

## 2025-06-06 RX ADMIN — EPHEDRINE SULFATE 2.5 MG: 50 INJECTION INTRAVENOUS at 13:36

## 2025-06-06 RX ADMIN — ALBUMIN HUMAN 250 ML: 0.05 INJECTION, SOLUTION INTRAVENOUS at 20:21

## 2025-06-06 RX ADMIN — HEPARIN SODIUM 5000 UNITS: 5000 INJECTION INTRAVENOUS; SUBCUTANEOUS at 15:29

## 2025-06-06 RX ADMIN — ALBUMIN HUMAN 250 ML: 0.05 INJECTION, SOLUTION INTRAVENOUS at 17:28

## 2025-06-06 RX ADMIN — LIDOCAINE HYDROCHLORIDE 100 MG: 20 INJECTION, SOLUTION INFILTRATION; PERINEURAL at 07:47

## 2025-06-06 RX ADMIN — CEFAZOLIN 2 G: 1 INJECTION, POWDER, FOR SOLUTION INTRAMUSCULAR; INTRAVENOUS at 08:13

## 2025-06-06 RX ADMIN — BACITRACIN 1 APPLICATION: 500 OINTMENT TOPICAL at 20:29

## 2025-06-06 RX ADMIN — NOREPINEPHRINE BITARTRATE 8 MCG: 1 INJECTION, SOLUTION, CONCENTRATE INTRAVENOUS at 20:12

## 2025-06-06 RX ADMIN — FENTANYL CITRATE 50 MCG: 50 INJECTION, SOLUTION INTRAMUSCULAR; INTRAVENOUS at 08:06

## 2025-06-06 RX ADMIN — ALBUMIN HUMAN 250 ML: 0.05 INJECTION, SOLUTION INTRAVENOUS at 08:36

## 2025-06-06 RX ADMIN — ROPIVACAINE HYDROCHLORIDE 15 ML: 5 INJECTION, SOLUTION EPIDURAL; INFILTRATION; PERINEURAL at 06:57

## 2025-06-06 RX ADMIN — ROCURONIUM BROMIDE 10 MG: 10 INJECTION INTRAVENOUS at 19:59

## 2025-06-06 RX ADMIN — PAPAVERINE HYDROCHLORIDE 180 MG: 30 INJECTION INTRAMUSCULAR; INTRAVENOUS at 09:30

## 2025-06-06 RX ADMIN — ACETAMINOPHEN 975 MG: 325 TABLET ORAL at 07:07

## 2025-06-06 RX ADMIN — SODIUM CHLORIDE, SODIUM LACTATE, POTASSIUM CHLORIDE, AND CALCIUM CHLORIDE: 600; 310; 30; 20 INJECTION, SOLUTION INTRAVENOUS at 08:02

## 2025-06-06 RX ADMIN — ACETAMINOPHEN 1000 MG: 10 INJECTION, SOLUTION INTRAVENOUS at 15:29

## 2025-06-06 RX ADMIN — LIDOCAINE HYDROCHLORIDE 30 ML: 20 INJECTION, SOLUTION INFILTRATION; PERINEURAL at 09:29

## 2025-06-06 RX ADMIN — ESMOLOL HYDROCHLORIDE 20 MG: 10 INJECTION, SOLUTION INTRAVENOUS at 18:19

## 2025-06-06 RX ADMIN — MIDAZOLAM HYDROCHLORIDE 2 MG: 2 INJECTION, SOLUTION INTRAMUSCULAR; INTRAVENOUS at 07:36

## 2025-06-06 RX ADMIN — ROCURONIUM BROMIDE 30 MG: 10 INJECTION INTRAVENOUS at 09:51

## 2025-06-06 RX ADMIN — INDOCYANINE GREEN AND WATER 2 ML: KIT at 15:20

## 2025-06-06 RX ADMIN — EPHEDRINE SULFATE 10 MG: 50 INJECTION INTRAVENOUS at 17:46

## 2025-06-06 RX ADMIN — EPHEDRINE SULFATE 5 MG: 50 INJECTION INTRAVENOUS at 20:31

## 2025-06-06 RX ADMIN — APREPITANT 40 MG: 40 CAPSULE ORAL at 07:07

## 2025-06-06 RX ADMIN — ALBUMIN HUMAN 250 ML: 0.05 INJECTION, SOLUTION INTRAVENOUS at 19:52

## 2025-06-06 RX ADMIN — METOCLOPRAMIDE 10 MG: 5 INJECTION, SOLUTION INTRAMUSCULAR; INTRAVENOUS at 21:29

## 2025-06-06 RX ADMIN — HYDROMORPHONE HYDROCHLORIDE 0.5 MG: 1 INJECTION, SOLUTION INTRAMUSCULAR; INTRAVENOUS; SUBCUTANEOUS at 20:39

## 2025-06-06 RX ADMIN — ALBUMIN HUMAN 250 ML: 0.05 INJECTION, SOLUTION INTRAVENOUS at 12:56

## 2025-06-06 RX ADMIN — HEPARIN SODIUM 5000 UNITS: 5000 INJECTION, SOLUTION INTRAVENOUS; SUBCUTANEOUS at 07:11

## 2025-06-06 RX ADMIN — EPHEDRINE SULFATE 5 MG: 50 INJECTION INTRAVENOUS at 09:24

## 2025-06-06 RX ADMIN — ROCURONIUM BROMIDE 20 MG: 10 INJECTION INTRAVENOUS at 12:38

## 2025-06-06 RX ADMIN — METHOCARBAMOL 1000 MG: 100 INJECTION INTRAMUSCULAR; INTRAVENOUS at 21:29

## 2025-06-06 RX ADMIN — ROCURONIUM BROMIDE 10 MG: 10 INJECTION INTRAVENOUS at 14:35

## 2025-06-06 RX ADMIN — EPHEDRINE SULFATE 5 MG: 50 INJECTION INTRAVENOUS at 09:59

## 2025-06-06 RX ADMIN — ESMOLOL HYDROCHLORIDE 10 MG: 10 INJECTION, SOLUTION INTRAVENOUS at 14:44

## 2025-06-06 RX ADMIN — SODIUM CHLORIDE, SODIUM LACTATE, POTASSIUM CHLORIDE, AND CALCIUM CHLORIDE: 600; 310; 30; 20 INJECTION, SOLUTION INTRAVENOUS at 13:32

## 2025-06-06 RX ADMIN — HYDROMORPHONE HYDROCHLORIDE 0.25 MG: 1 INJECTION, SOLUTION INTRAMUSCULAR; INTRAVENOUS; SUBCUTANEOUS at 17:50

## 2025-06-06 RX ADMIN — EPHEDRINE SULFATE 5 MG: 50 INJECTION INTRAVENOUS at 18:36

## 2025-06-06 RX ADMIN — SODIUM CHLORIDE 1000 ML: 900 IRRIGANT IRRIGATION at 20:02

## 2025-06-06 SDOH — SOCIAL STABILITY: SOCIAL INSECURITY: WITHIN THE LAST YEAR, HAVE YOU BEEN AFRAID OF YOUR PARTNER OR EX-PARTNER?: NO

## 2025-06-06 SDOH — SOCIAL STABILITY: SOCIAL INSECURITY
WITHIN THE LAST YEAR, HAVE YOU BEEN RAPED OR FORCED TO HAVE ANY KIND OF SEXUAL ACTIVITY BY YOUR PARTNER OR EX-PARTNER?: NO

## 2025-06-06 SDOH — SOCIAL STABILITY: SOCIAL INSECURITY: HAS ANYONE EVER THREATENED TO HURT YOUR FAMILY OR YOUR PETS?: NO

## 2025-06-06 SDOH — ECONOMIC STABILITY: INCOME INSECURITY: IN THE PAST 12 MONTHS HAS THE ELECTRIC, GAS, OIL, OR WATER COMPANY THREATENED TO SHUT OFF SERVICES IN YOUR HOME?: NO

## 2025-06-06 SDOH — SOCIAL STABILITY: SOCIAL INSECURITY: ABUSE: ADULT

## 2025-06-06 SDOH — SOCIAL STABILITY: SOCIAL INSECURITY: WITHIN THE LAST YEAR, HAVE YOU BEEN HUMILIATED OR EMOTIONALLY ABUSED IN OTHER WAYS BY YOUR PARTNER OR EX-PARTNER?: NO

## 2025-06-06 SDOH — ECONOMIC STABILITY: FOOD INSECURITY: WITHIN THE PAST 12 MONTHS, THE FOOD YOU BOUGHT JUST DIDN'T LAST AND YOU DIDN'T HAVE MONEY TO GET MORE.: NEVER TRUE

## 2025-06-06 SDOH — ECONOMIC STABILITY: FOOD INSECURITY: WITHIN THE PAST 12 MONTHS, YOU WORRIED THAT YOUR FOOD WOULD RUN OUT BEFORE YOU GOT THE MONEY TO BUY MORE.: NEVER TRUE

## 2025-06-06 SDOH — SOCIAL STABILITY: SOCIAL INSECURITY
WITHIN THE LAST YEAR, HAVE YOU BEEN KICKED, HIT, SLAPPED, OR OTHERWISE PHYSICALLY HURT BY YOUR PARTNER OR EX-PARTNER?: NO

## 2025-06-06 SDOH — HEALTH STABILITY: MENTAL HEALTH: CURRENT SMOKER: 0

## 2025-06-06 SDOH — SOCIAL STABILITY: SOCIAL INSECURITY: ARE THERE ANY APPARENT SIGNS OF INJURIES/BEHAVIORS THAT COULD BE RELATED TO ABUSE/NEGLECT?: NO

## 2025-06-06 SDOH — SOCIAL STABILITY: SOCIAL INSECURITY: ARE YOU OR HAVE YOU BEEN THREATENED OR ABUSED PHYSICALLY, EMOTIONALLY, OR SEXUALLY BY ANYONE?: NO

## 2025-06-06 SDOH — SOCIAL STABILITY: SOCIAL INSECURITY: HAVE YOU HAD ANY THOUGHTS OF HARMING ANYONE ELSE?: NO

## 2025-06-06 SDOH — SOCIAL STABILITY: SOCIAL INSECURITY: WERE YOU ABLE TO COMPLETE ALL THE BEHAVIORAL HEALTH SCREENINGS?: YES

## 2025-06-06 SDOH — SOCIAL STABILITY: SOCIAL INSECURITY: DO YOU FEEL ANYONE HAS EXPLOITED OR TAKEN ADVANTAGE OF YOU FINANCIALLY OR OF YOUR PERSONAL PROPERTY?: NO

## 2025-06-06 SDOH — SOCIAL STABILITY: SOCIAL INSECURITY: DOES ANYONE TRY TO KEEP YOU FROM HAVING/CONTACTING OTHER FRIENDS OR DOING THINGS OUTSIDE YOUR HOME?: NO

## 2025-06-06 SDOH — SOCIAL STABILITY: SOCIAL INSECURITY: DO YOU FEEL UNSAFE GOING BACK TO THE PLACE WHERE YOU ARE LIVING?: NO

## 2025-06-06 SDOH — SOCIAL STABILITY: SOCIAL INSECURITY: HAVE YOU HAD THOUGHTS OF HARMING ANYONE ELSE?: NO

## 2025-06-06 ASSESSMENT — ACTIVITIES OF DAILY LIVING (ADL)
TOILETING: INDEPENDENT
JUDGMENT_ADEQUATE_SAFELY_COMPLETE_DAILY_ACTIVITIES: YES
DRESSING YOURSELF: INDEPENDENT
ADEQUATE_TO_COMPLETE_ADL: YES
BATHING: INDEPENDENT
ASSISTIVE_DEVICE: EYEGLASSES
GROOMING: INDEPENDENT
HEARING - LEFT EAR: FUNCTIONAL
PATIENT'S MEMORY ADEQUATE TO SAFELY COMPLETE DAILY ACTIVITIES?: YES
FEEDING YOURSELF: INDEPENDENT
HEARING - RIGHT EAR: FUNCTIONAL
WALKS IN HOME: INDEPENDENT
LACK_OF_TRANSPORTATION: NO

## 2025-06-06 ASSESSMENT — COGNITIVE AND FUNCTIONAL STATUS - GENERAL
PATIENT BASELINE BEDBOUND: NO
CLIMB 3 TO 5 STEPS WITH RAILING: A LOT
TURNING FROM BACK TO SIDE WHILE IN FLAT BAD: A LITTLE
DAILY ACTIVITIY SCORE: 19
WALKING IN HOSPITAL ROOM: A LOT
TOILETING: A LITTLE
DRESSING REGULAR UPPER BODY CLOTHING: A LITTLE
STANDING UP FROM CHAIR USING ARMS: A LOT
DRESSING REGULAR LOWER BODY CLOTHING: A LOT
HELP NEEDED FOR BATHING: A LITTLE
MOVING FROM LYING ON BACK TO SITTING ON SIDE OF FLAT BED WITH BEDRAILS: A LITTLE
MOBILITY SCORE: 14
MOVING TO AND FROM BED TO CHAIR: A LOT

## 2025-06-06 ASSESSMENT — LIFESTYLE VARIABLES
HOW OFTEN DO YOU HAVE 6 OR MORE DRINKS ON ONE OCCASION: NEVER
AUDIT-C TOTAL SCORE: 1
SKIP TO QUESTIONS 9-10: 1
AUDIT-C TOTAL SCORE: 1
HOW OFTEN DO YOU HAVE A DRINK CONTAINING ALCOHOL: MONTHLY OR LESS
HOW MANY STANDARD DRINKS CONTAINING ALCOHOL DO YOU HAVE ON A TYPICAL DAY: 1 OR 2

## 2025-06-06 ASSESSMENT — PAIN SCALES - GENERAL
PAIN_LEVEL: 0
PAINLEVEL_OUTOF10: 3
PAINLEVEL_OUTOF10: 5 - MODERATE PAIN
PAINLEVEL_OUTOF10: 8
PAINLEVEL_OUTOF10: 4
PAINLEVEL_OUTOF10: 8
PAINLEVEL_OUTOF10: 3
PAINLEVEL_OUTOF10: 0 - NO PAIN
PAINLEVEL_OUTOF10: 3

## 2025-06-06 ASSESSMENT — PAIN - FUNCTIONAL ASSESSMENT
PAIN_FUNCTIONAL_ASSESSMENT: 0-10

## 2025-06-06 NOTE — OP NOTE
MASTECTOMY, UNILATERAL, SIMPLE (R) Operative Note     Date: 2025  OR Location: [facility] OR    Name: Yvrose John, : 1965, Age: 60 y.o., MRN: [id number], Sex: female    Diagnosis  Pre-op Diagnosis      * Lymphedema of breast [I89.0]     * Acquired breast deformity [N64.89]     * Breast pain [N64.4] Post-op Diagnosis     * Lymphedema of breast [I89.0]     * Acquired breast deformity [N64.89]     * Breast pain [N64.4]     Procedures  MASTECTOMY, UNILATERAL, SIMPLE  53932 - CO MASTECTOMY SIMPLE COMPLETE    RECONSTRUCTION, BREAST, USING FREE FLAP  18097 - CO BREAST RECONSTRUCTION W/FREE FLAP      Surgeons   Panel 1:     * Regi Maldonado - Primary  Panel 2:     * Cornelius Liz - Primary    Resident/Fellow/Other Assistant:  Surgeons and Role:  Panel 1:     * Leigh Roper MD - Resident - Assisting  Panel 2:     * Carlyn Cody MD - Resident - Assisting     * CHLOE Sims-C - PHYLLIS First Assist    Staff:   Janetulator: Andres Anneub Person: Zeferino Anneub Person: Marvin    Anesthesia Staff: Anesthesiologist: Meena Larson MD  CRNA: YASH Avitia-CRNA  C-AA: SERA Wilhelm  SRNA: Ramo Wu RN    Procedure Summary  Anesthesia: General  ASA: III  Estimated Blood Loss: 100mL  Intra-op Medications:   Administrations occurring from 0650 to 1825 on 25:   Medication Name Total Dose   papaverine injection 180 mg   lidocaine (Xylocaine) 20 mg/mL (2 %) injection 30 mL   heparin (porcine) 25,000 Units in sodium chloride 0.9 % 250 mL irrigation 25,000 Units   albumin human bottle 5% 250 mL   ceFAZolin (Ancef) vial 1 g 2 g   dexAMETHasone (Decadron) 4 mg/mL IV Syringe 2 mL 8 mg   ePHEDrine injection 5 mg   fentaNYL (Sublimaze) injection 50 mcg/mL 100 mcg   LR bolus Cannot be calculated   LR bolus Cannot be calculated   lidocaine (Xylocaine) injection 2 % 100 mg   midazolam PF (Versed) injection 1 mg/mL 2 mg   propofol (Diprivan) injection 10 mg/mL 1,884.41 mg   rocuronium (ZeMuron) 50 mg/5  mL injection 100 mg   acetaminophen (Tylenol) tablet 975 mg 975 mg   aprepitant (Emend) capsule 40 mg 40 mg   heparin (porcine) injection 5,000 Units 5,000 Units   ropivacaine (Naropin) injection 0.5 % 15 mL              Anesthesia Record               Intraprocedure I/O Totals          Intake    LR bolus 1000.00 mL    Total Intake 1000 mL       Output    Urine 650 mL    Total Output 650 mL       Net    Net Volume 350 mL          Specimen: No specimens collected       Drains and/or Catheters: per Dr. Liz  Indications: Yvrose John is an 60 y.o. female who is having surgery for Lymphedema of breast [I89.0]  Acquired breast deformity [N64.89]  Breast pain [N64.4].     The patient was seen in the preoperative area. The risks, benefits, complications, treatment options, non-operative alternatives, expected recovery and outcomes were discussed with the patient. The possibilities of reaction to medication, pulmonary aspiration, injury to surrounding structures, bleeding, recurrent infection, the need for additional procedures, failure to diagnose a condition, and creating a complication requiring transfusion or operation were discussed with the patient. The patient concurred with the proposed plan, giving informed consent.  The site of surgery was properly noted/marked if necessary per policy. The patient has been actively warmed in preoperative area. Preoperative antibiotics have been ordered and given within 1 hours of incision. Venous thrombosis prophylaxis have been ordered including bilateral sequential compression devices    Procedure Details:   Operative indications: The patient was diagnosed with invasive ductal carcinoma of the right breast in June 2022. She had neoadjuvant chemotherapy followed by a lumpectomy and SLNB. She had radiation and has developed severe radiation-induced skin changes at the right breast which appears severely edematous, and deformed into cone shape. The breast skin especially  around the nipple-areola complex has distinct orange peel appearance. Surgical options were reviewed in detail with the patient. The patient chose to proceed with a right simple mastectomy with BULMARO flap reconstruction. The risks, benefits and procedure were discussed with the patient including bleeding, infection, scar tissue formation, decreased function, mobility, or strength of the upper extremity, pain and numbness of the axilla and upper arm, lymphedema and general anesthesia. The risks of reconstruction were discussed by the plastic surgeon, Dr. Liz. She understood all risks and agreed to proceed.    Operative report: The patient was taken to the operating room and placed on the table in the supine position. A huddle was performed. The site had been marked preoperatively. She received general anesthesia and IV antibiotics without complication. She was prepped and draped in the usual sterile fashion. A timeout was performed. An elliptical incision was made on the right chest to include the nipple-areolar complex. The incision was marked by the plastic surgeon. The incision was made with a plasma blade knife. Dissection was continued with plasma blade cautery and care was taken to make sure that the flaps were not too thick or too thin in any one area. Dissection continued superiorly to the clavicle, medially to the sternum, inferiorly to the inframammary fold and laterally to the edge of the latissimus dorsi muscle. Throughout the dissection, hemostasis was achieved with cautery. The breast was extremely dense and firm. The breast and the pectoralis fascia were then taken off of the pectoralis muscle with cautery. This dissection was difficult due to the fibrosis and edema and additional time was needed for dissection. Once the breast was completely removed from the chest wall, it was labeled with a short stitch superiorly and a long stitch laterally. It was labeled right breast, weighed (1872 gm) and sent  to pathology. Some additional tissue was removed from the superior medial aspect of the flap with cautery to thin this out. That tissue was sent to pathology with the right breast. The area was irrigated and the irrigation fluid was suctioned out. There was good hemostasis. She remained in the operating room for BULMARO flap reconstruction by Dr. Liz.   Evidence of Infection: No   Complications:  None; patient tolerated the procedure well.    Disposition: remained in OR  Condition: stable         Task Performed by PHYLLIS First Assist or Physician Assistant:   N/A      Attending Attestation: I performed the procedure.    Regi Maldonado  Phone Number: [phone]

## 2025-06-06 NOTE — ANESTHESIA PROCEDURE NOTES
Peripheral IV  Date/Time: 6/6/2025 8:02 AM  Inserted by: Meena Larson MD    Placement  Needle size: 16 G  Laterality: left  Location: antecubital  Local anesthetic: none  Site prep: alcohol  Technique: ultrasound guided  Attempts: 1

## 2025-06-06 NOTE — ANESTHESIA PROCEDURE NOTES
Peripheral Block    Patient location during procedure: pre-op  Medication administered at: 6/6/2025 6:57 AM  End time: 6/6/2025 7:01 AM  Reason for block: at surgeon's request and post-op pain management  Staffing  Performed: resident and attending   Authorized by: Zabrina Orozco MD    Performed by: Jude Ramirez MD  Preanesthetic Checklist  Completed: patient identified, IV checked, site marked, risks and benefits discussed, surgical consent, monitors and equipment checked, pre-op evaluation and timeout performed   Timeout performed at: 6/6/2025 6:56 AM  Peripheral Block  Patient position: sitting  Prep: ChloraPrep  Patient monitoring: heart rate and continuous pulse ox  : Paravertebral.  Laterality: right  Injection technique: catheter  Guidance: ultrasound guided  Local infiltration: lidocaine  Infiltration strength: 1 %  Dose: 3 mL  Needle  Needle type: Tuohy   Needle gauge: 22 G  Needle length: 8 cm  Needle localization: ultrasound guidance     image stored in chart  Assessment  Injection assessment: negative aspiration for heme, no paresthesia on injection, incremental injection and local visualized surrounding nerve on ultrasound  Additional Notes  Paravertebral block:     Prior to procedure: Following a focused history, procedure-related and patient-specific complications were discussed. Risks, benefits, and alternatives were explained. Informed, written consent was provided by the patient and/or surrogate decision maker for the block. Anticoagulation (if any) was held per KELLY guidelines. ASA monitors were applied. Patient was positioned, prepped with chlorhexidine, and draped with sterile towels.     Ultrasound guidance was used to visualize the erector spinae muscle above the TP/costal junction at T5. Skin was numbed with 1% lidocaine. Needle was inserted and advanced towards target with visualization of the needle throughout duration of the procedure. A total of 15 cc of 0.5% ropivacaine, was divided  and injected unilaterally. Catheter threaded and secured. Patient tolerated procedure well.    Timeout by Desiree RN    Medications Administered  ropivacaine (NAROPIN) 5 MG/ML Perineural - perineural injection   15 mL - 6/6/2025 6:57:00 AM

## 2025-06-06 NOTE — H&P
History Of Present Illness  Yvrose John is a 60 y.o. female presents today for follow up carcinoma of the right breast.   She has severe radiation-induced skin changes at the right breast which appears severely edematous, and deformed into cone shape. The breast skin especially around the nipple-areola complex has distinct orange peel appearance. Patient stated that the breast is not painful, but it becomes painful with pressure.   She has met with Dr. Liz and is considering right mastectomy with BULMARO flap reconstruction.      Treatment history:  - Noted pain, rash right breast.  - 5/2022: Skin biopsy showed eosinophilic spongiotic dermatitis. Mammogram and ultrasound showed a 1.4 cm right breast mass with multiple abnormal right axillary LN's.  - 6/3/22: US-guided biopsy of the right breast mass showed IDC, grade 2-3, ER >95% GA 40%, Her2+ (3+ IHC). An axillary LN was positive for metastatic carcinoma.  - 7/5/22: PET/CT did not showed any evidence of distant metastatic disease, but did show hypermetabolic activity in the 2.5 cm right breast mass and ALN's (cT2N1).  - Initiated on TCHP 7/1/22. She developed papilledema that was attributed to the carboplatin, so this was removed from cycle 2-6. Completed cycle #6 of THP on 10/17/22.  - HP initiated while awaiting surgery.  - Right lumpectomy/ALND performed on 11/30/22. 1.4 cm of residual grade 2 breast tumor noted with 4 LN's positive for carcinoma (2 macrometastases; 2 micrometastases). No GALI noted, but tumor deposits present in perinodal soft tissue. +LVI. Margins negative.  ypT1c ypN2a.  - Initial plan was for immediate reconstruction, but ultimately decided to purse adjuvant RT.  - Switched to adjuvant TDM1 1/24/23. Completed 14 cycles 10/2023.  - Adjuvant RT 2/6/23-3/16/23.  - 3/2023: Tamoxifen initiated, switched to anastrozole after a few months once postmenopausal status was confirmed.        Surgery: 11/30/22: Right lumpectomy and axillary lymph  node dissection  Radiation: 2/6/23-3/16/23  Systemic therapy: 7/5/22- 10/17/22: Neoadjuvant TCHP; 1/24: TDM1; 3/2023: tamoxifen/anastrozole     Mammogram: 6/6/24: Posttreatment changes in the right breast.  No evidence of malignancy bilaterally.  Category 2.     Radiology review: All images and reports were personally reviewed and the findings discussed with the patient.      Past Medical History  Medical History[1]    Surgical History  Surgical History[2]     Social History  She reports that she quit smoking about 30 years ago. Her smoking use included cigarettes. She has never been exposed to tobacco smoke. She has never used smokeless tobacco. She reports that she does not currently use alcohol after a past usage of about 2.0 standard drinks of alcohol per week. She reports that she does not use drugs.    Family History  Family History[3]     Allergies  Patient has no known allergies.    Review of Systems  ROS: 12-point review of system performed and is negative except as stated in HPI.       Physical Exam  General: Alert and oriented x 3.  Mood and affect are appropriate.  Neck: supple, no masses, no cervical adenopathy.  Cardiovascular: no lower extremity edema.  Pulmonary: breathing non labored on room air.  GI: Abdomen soft, no masses. No hepatomegaly or splenomegaly.  Lymph nodes: No supraclavicular or axillary adenopathy bilaterally.  Musculoskeletal: Full range of motion in the upper extremities bilaterally.  Neuro: denies dizziness, tremors  Physical Exam  Chest:      Comments: There is no cervical, supraclavicular, or axillary lymphadenopathy.  The breasts are symmetric bilaterally. In the left breast, there are no dominant masses, no skin changes, and no nipple discharge. In the right breast, the skin is taut.  The breast is extremely firm and conical in shape.  There is firmness in the upper chest as well.  No palpable masses.  No upper extremity lymphedema.  No peau d'orange.     Last Recorded  "Vitals  Blood pressure 173/84, pulse 77, temperature 37.2 °C (99 °F), temperature source Temporal, resp. rate 16, height 1.6 m (5' 3\"), weight 91 kg (200 lb 9.9 oz), SpO2 98%.    Relevant Results         Assessment & Plan  Malignant neoplasm of right female breast    Lymphedema of breast    Acquired breast deformity    Breast pain      Right breast cancer diagnosed in June 2022.  Invasive ductal carcinoma, grade 2-3; ER greater than 95%, NJ 40%, HER2 positive.  Clinical stage II A (cT1c N1)  -ypT1c N2a following neoadjuvant chemotherapy     Clinical breast exam shows no palpable masses.  There is no evidence of cancer recurrence.     Plan for completion right mastectomy today with breast surgery, followed by immediate BULMARO reconstruction with plastic surgery. Informed consent obtained.     Discussed with attending Dr. Rock Leigh Roper MD  General Surgery Resident  Surgical Oncology  [facility] [id number] / Compa [id number]        Leigh Roper MD         [1]   Past Medical History:  Diagnosis Date    Breast cancer 06/2022    Changes in skin texture 05/31/2022    Breast skin changes    Heart murmur     History of mitral valve prolapse     History of rheumatic fever     Hx antineoplastic chemo     Personal history of irradiation     Personal history of other specified conditions 05/31/2022    History of lump of right breast    Rheumatic fever    [2]   Past Surgical History:  Procedure Laterality Date    BREAST LUMPECTOMY Right 11/30/2022    right lumpectomy w/ chemo and rad    DILATION AND CURETTAGE OF UTERUS  07/02/2013    Dilation And Curettage    HYSTEROSCOPY  07/02/2013    Hysteroscopy With Endometrial Ablation    IR CVC PORT REMOVAL  11/15/2023    IR CVC PORT REMOVAL 11/15/2023 Shawn Shahid MD AD CVEPINV    IR CVC PORT REMOVAL  11/15/2023    IR CVC PORT REMOVAL    OTHER SURGICAL HISTORY  07/12/2013    Laparoscopic Excision Left Fallopian Tube Cyst (___ Cm)    TOTAL ABDOMINAL HYSTERECTOMY  07/12/2013    Total " Abdominal Hysterectomy    TUBAL LIGATION  07/02/2013    Tubal Ligation   [3]   Family History  Problem Relation Name Age of Onset    Hypertension Mother      Diabetes type II Father      Prostate cancer Father      Breast cancer Sister

## 2025-06-06 NOTE — ANESTHESIA PROCEDURE NOTES
Peripheral IV  Date/Time: 6/6/2025 11:50 AM      Placement  Needle size: 16 G  Laterality: left  Location: external jugular  Local anesthetic: none  Site prep: chlorhexidine and alcohol  Technique: anatomical landmarks  Attempts: 1

## 2025-06-06 NOTE — ANESTHESIA PROCEDURE NOTES
Airway  Date/Time: 6/6/2025 7:51 AM  Reason: elective      Staffing  Performed: [facility]   Authorized by: Meena Larson MD    Performed by: Ramo Wu RN  Patient location during procedure: OR    Patient Condition  Indications for airway management: anesthesia  Patient position: sniffing  Sedation level: deep     Final Airway Details   Preoxygenated: yes  Final airway type: endotracheal airway  Successful airway: ETT  Cuffed: yes   Successful intubation technique: direct laryngoscopy  Adjuncts used in placement: intubating stylet  Endotracheal tube insertion site: oral  Blade: David  Blade size: #3  ETT size (mm): 7.0  Cormack-Lehane Classification: grade I - full view of glottis  Placement verified by: chest auscultation and capnometry   Cuff volume (mL): 9  Measured from: lips  ETT to lips (cm): 22  Number of attempts at approach: 1    Additional Comments  Full view of cords with Garcia

## 2025-06-06 NOTE — ANESTHESIA PROCEDURE NOTES
Arterial Line:    Date/Time: 6/6/2025 8:04 AM    Staffing  Performed: attending and SRNA   Authorized by: Meena Larson MD    Performed by: Ramo Wu RN    An arterial line was placed. Procedure performed using ultrasound guidance.in the OR for the following indication(s): continuous blood pressure monitoring.    A 20 gauge (size) (length), Arrow (type) catheter was placed into the Left radial artery, secured by Tegaderm,   Seldinger technique used.  Events:  patient tolerated procedure well with no complications.      Additional notes:  Sterile prep and drapped, x 1 attempt without complication

## 2025-06-06 NOTE — CONSULTS
Yvrose John is a 60 y.o. year old female patient who presents for Right simple mastectomy with reconstruction with free flap with Bel Maldonado and Phil Crockett on 25. Acute Pain consulted for block for postoperative pain control.     Anticipated Postop Pain Issues -   Palliative: typically relieved with IV analgesics and regional local anesthetics  Provocative: typically with movement  Quality: typically burning and aching  Radiation: typically none  Severity: typically severe 8-10/10  Timing: typically constant    Medical History[1]     Surgical History[2]     Family History[3]     Social History     Socioeconomic History    Marital status:      Spouse name: Not on file    Number of children: Not on file    Years of education: Not on file    Highest education level: Not on file   Occupational History    Not on file   Tobacco Use    Smoking status: Former     Current packs/day: 0.00     Types: Cigarettes     Quit date:      Years since quittin.     Passive exposure: Never    Smokeless tobacco: Never   Vaping Use    Vaping status: Never Used   Substance and Sexual Activity    Alcohol use: Not Currently     Alcohol/week: 2.0 standard drinks of alcohol     Types: 2 Glasses of wine per week     Comment: social    Drug use: Never    Sexual activity: Defer   Other Topics Concern    Not on file   Social History Narrative    Not on file     Social Drivers of Health     Financial Resource Strain: Low Risk  (2023)    Overall Financial Resource Strain (CARDIA)     Difficulty of Paying Living Expenses: Not very hard   Food Insecurity: No Food Insecurity (2023)    Received from [facility]    Hunger Vital Sign     Worried About Running Out of Food in the Last Year: Never true     Ran Out of Food in the Last Year: Never true   Transportation Needs: No Transportation Needs (2023)    PRAPARE - Transportation     Lack of Transportation (Medical): No     Lack of Transportation (Non-Medical):  No   Physical Activity: Sufficiently Active (6/13/2023)    Received from [facility]    Exercise Vital Sign     Days of Exercise per Week: 7 days     Minutes of Exercise per Session: 30 min   Stress: No Stress Concern Present (6/13/2023)    Received from [facility]    [employer] [place] [employer] - Occupational Stress Questionnaire     Feeling of Stress : Only a little   Social Connections: Moderately Isolated (6/13/2023)    Received from [facility]    Social Connection and Isolation Panel [NHANES]     Frequency of Communication with Friends and Family: Three times a week     Frequency of Social Gatherings with Friends and Family: Once a week     Attends Yarsanism Services: Never     Active Member of Clubs or Organizations: No     Attends Club or Organization Meetings: Never     Marital Status:    Intimate Partner Violence: Not At Risk (6/13/2023)    Received from [facility]    Humiliation, Afraid, Rape, and Kick questionnaire     Fear of Current or Ex-Partner: No     Emotionally Abused: No     Physically Abused: No     Sexually Abused: No   Housing Stability: Low Risk  (11/17/2023)    Housing Stability Vital Sign     Unable to Pay for Housing in the Last Year: No     Number of Places Lived in the Last Year: 1     Unstable Housing in the Last Year: No        RX Allergies[4]      Review of Systems  Gen: No fatigue, anorexia, insomnia, fever.   Eyes: No vision loss, double vision, drainage, eye pain.   ENT: No pharyngitis, dry mouth, no hearing changes or ear discharge  Cardiac: No chest pain, palpitations, syncope, near syncope.   Pulmonary: No shortness of breath, cough, hemoptysis.   Heme/lymph: No swollen glands, fever, bleeding.   GI: No abdominal pain, change in bowel habits, melena, hematemesis, hematochezia, nausea, vomiting, diarrhea.   : No discharge, dysuria, frequency, urgency, hematuria.  Endo: No polyuria or weight loss.   Musculoskeletal: Negative for any pain or  loss of ROM/weakness  Skin: No rashes or lesions  Neuro: Normal speech, no numbness or weakness. No gait difficulties  Review of systems is otherwise negative unless stated above or in history of present illness.    Physical Exam:  Constitutional:  no distress, alert and cooperative  Eyes: clear sclera  Head/Neck: No apparent injury, trachea midline  Respiratory/Thorax: Patent airways, thorax symmetric, breathing comfortably  Cardiovascular: no pitting edema  Gastrointestinal: Nondistended  Musculoskeletal: ROM intact  Extremities: no clubbing  Neurological: alert, ebrnstein x4  Psychological: Appropriate affect    No results found for this or any previous visit (from the past 24 hours).     Yvrose John is a 60 y.o. year old female patient with PMH of right breast cancer, T2 N1 M0 invasive ductal carcinoma, metastasized to right axillary lymph node s/p Right lumpectomy with removal of 18 lymph nodes 2022 and chemo/radiation with continue breast symptoms who presents for Right simple mastectomy with reconstruction with free flap with Bel Maldonado and Phil Crockett on 6/6/25. Acute Pain consulted for block for postoperative pain control.     Plan:    - Right paravertebral block with catheter performed preoperatively on 6/6/25  - Pain medications per primary team  - Will see on POD1 if inpatient    Acute Pain Team  pg [phone] ph [phone].        [1]   Past Medical History:  Diagnosis Date    Breast cancer 06/2022    Changes in skin texture 05/31/2022    Breast skin changes    Heart murmur     History of mitral valve prolapse     History of rheumatic fever     Hx antineoplastic chemo     Personal history of irradiation     Personal history of other specified conditions 05/31/2022    History of lump of right breast    Rheumatic fever    [2]   Past Surgical History:  Procedure Laterality Date    BREAST LUMPECTOMY Right 11/30/2022    right lumpectomy w/ chemo and rad    DILATION AND CURETTAGE OF UTERUS  07/02/2013    Dilation And  Curettage    HYSTEROSCOPY  07/02/2013    Hysteroscopy With Endometrial Ablation    IR CVC PORT REMOVAL  11/15/2023    IR CVC PORT REMOVAL 11/15/2023 Shawn Shahid MD AD CVEPINV    IR CVC PORT REMOVAL  11/15/2023    IR CVC PORT REMOVAL    OTHER SURGICAL HISTORY  07/12/2013    Laparoscopic Excision Left Fallopian Tube Cyst (___ Cm)    TOTAL ABDOMINAL HYSTERECTOMY  07/12/2013    Total Abdominal Hysterectomy    TUBAL LIGATION  07/02/2013    Tubal Ligation   [3]   Family History  Problem Relation Name Age of Onset    Hypertension Mother      Diabetes type II Father      Prostate cancer Father      Breast cancer Sister     [4] No Known Allergies

## 2025-06-07 LAB
ANION GAP SERPL CALC-SCNC: 16 MMOL/L (ref 10–20)
BASOPHILS # BLD AUTO: 0.01 X10*3/UL (ref 0–0.1)
BASOPHILS NFR BLD AUTO: 0.1 %
BUN SERPL-MCNC: 10 MG/DL (ref 6–23)
CALCIUM SERPL-MCNC: 9 MG/DL (ref 8.6–10.6)
CHLORIDE SERPL-SCNC: 108 MMOL/L (ref 98–107)
CO2 SERPL-SCNC: 23 MMOL/L (ref 21–32)
CREAT SERPL-MCNC: 0.7 MG/DL (ref 0.5–1.05)
EGFRCR SERPLBLD CKD-EPI 2021: >90 ML/MIN/1.73M*2
EOSINOPHIL # BLD AUTO: 0 X10*3/UL (ref 0–0.7)
EOSINOPHIL NFR BLD AUTO: 0 %
ERYTHROCYTE [DISTWIDTH] IN BLOOD BY AUTOMATED COUNT: 13 % (ref 11.5–14.5)
GLUCOSE SERPL-MCNC: 112 MG/DL (ref 74–99)
HCT VFR BLD AUTO: 30.9 % (ref 36–46)
HGB BLD-MCNC: 9.7 G/DL (ref 12–16)
IMM GRANULOCYTES # BLD AUTO: 0.04 X10*3/UL (ref 0–0.7)
IMM GRANULOCYTES NFR BLD AUTO: 0.4 % (ref 0–0.9)
LYMPHOCYTES # BLD AUTO: 0.72 X10*3/UL (ref 1.2–4.8)
LYMPHOCYTES NFR BLD AUTO: 7.1 %
MCH RBC QN AUTO: 33.1 PG (ref 26–34)
MCHC RBC AUTO-ENTMCNC: 31.4 G/DL (ref 32–36)
MCV RBC AUTO: 106 FL (ref 80–100)
MONOCYTES # BLD AUTO: 0.89 X10*3/UL (ref 0.1–1)
MONOCYTES NFR BLD AUTO: 8.8 %
NEUTROPHILS # BLD AUTO: 8.42 X10*3/UL (ref 1.2–7.7)
NEUTROPHILS NFR BLD AUTO: 83.6 %
NRBC BLD-RTO: 0 /100 WBCS (ref 0–0)
PLATELET # BLD AUTO: 119 X10*3/UL (ref 150–450)
POTASSIUM SERPL-SCNC: 3.6 MMOL/L (ref 3.5–5.3)
RBC # BLD AUTO: 2.93 X10*6/UL (ref 4–5.2)
SODIUM SERPL-SCNC: 143 MMOL/L (ref 136–145)
WBC # BLD AUTO: 10.1 X10*3/UL (ref 4.4–11.3)

## 2025-06-07 PROCEDURE — 2500000001 HC RX 250 WO HCPCS SELF ADMINISTERED DRUGS (ALT 637 FOR MEDICARE OP)

## 2025-06-07 PROCEDURE — 1170000001 HC PRIVATE ONCOLOGY ROOM DAILY

## 2025-06-07 PROCEDURE — 99232 SBSQ HOSP IP/OBS MODERATE 35: CPT

## 2025-06-07 PROCEDURE — 2500000004 HC RX 250 GENERAL PHARMACY W/ HCPCS (ALT 636 FOR OP/ED): Performed by: PHYSICIAN ASSISTANT

## 2025-06-07 PROCEDURE — 2500000005 HC RX 250 GENERAL PHARMACY W/O HCPCS: Performed by: PHYSICIAN ASSISTANT

## 2025-06-07 PROCEDURE — 80048 BASIC METABOLIC PNL TOTAL CA: CPT | Performed by: PHYSICIAN ASSISTANT

## 2025-06-07 PROCEDURE — 2500000004 HC RX 250 GENERAL PHARMACY W/ HCPCS (ALT 636 FOR OP/ED)

## 2025-06-07 PROCEDURE — 36415 COLL VENOUS BLD VENIPUNCTURE: CPT | Performed by: PHYSICIAN ASSISTANT

## 2025-06-07 PROCEDURE — 2500000001 HC RX 250 WO HCPCS SELF ADMINISTERED DRUGS (ALT 637 FOR MEDICARE OP): Performed by: PHYSICIAN ASSISTANT

## 2025-06-07 PROCEDURE — 85025 COMPLETE CBC W/AUTO DIFF WBC: CPT | Performed by: PHYSICIAN ASSISTANT

## 2025-06-07 PROCEDURE — 99231 SBSQ HOSP IP/OBS SF/LOW 25: CPT | Performed by: STUDENT IN AN ORGANIZED HEALTH CARE EDUCATION/TRAINING PROGRAM

## 2025-06-07 RX ORDER — ESOMEPRAZOLE MAGNESIUM 40 MG/1
40 GRANULE, DELAYED RELEASE ORAL
Status: DISCONTINUED | OUTPATIENT
Start: 2025-06-08 | End: 2025-06-07

## 2025-06-07 RX ORDER — SODIUM CHLORIDE 9 MG/ML
50 INJECTION, SOLUTION INTRAVENOUS CONTINUOUS
Status: DISCONTINUED | OUTPATIENT
Start: 2025-06-07 | End: 2025-06-07

## 2025-06-07 RX ORDER — PANTOPRAZOLE SODIUM 40 MG/10ML
40 INJECTION, POWDER, LYOPHILIZED, FOR SOLUTION INTRAVENOUS
Status: DISCONTINUED | OUTPATIENT
Start: 2025-06-07 | End: 2025-06-07

## 2025-06-07 RX ORDER — OXYCODONE HYDROCHLORIDE 5 MG/1
5 TABLET ORAL EVERY 6 HOURS PRN
Refills: 0 | Status: DISCONTINUED | OUTPATIENT
Start: 2025-06-07 | End: 2025-06-08

## 2025-06-07 RX ORDER — KETOROLAC TROMETHAMINE 15 MG/ML
15 INJECTION, SOLUTION INTRAMUSCULAR; INTRAVENOUS EVERY 6 HOURS SCHEDULED
Status: DISCONTINUED | OUTPATIENT
Start: 2025-06-07 | End: 2025-06-07

## 2025-06-07 RX ORDER — SODIUM CHLORIDE, SODIUM LACTATE, POTASSIUM CHLORIDE, CALCIUM CHLORIDE 600; 310; 30; 20 MG/100ML; MG/100ML; MG/100ML; MG/100ML
50 INJECTION, SOLUTION INTRAVENOUS CONTINUOUS
Status: DISCONTINUED | OUTPATIENT
Start: 2025-06-07 | End: 2025-06-07

## 2025-06-07 RX ORDER — METHOCARBAMOL 500 MG/1
500 TABLET, FILM COATED ORAL EVERY 6 HOURS SCHEDULED
Status: DISPENSED | OUTPATIENT
Start: 2025-06-07

## 2025-06-07 RX ORDER — SODIUM CHLORIDE, SODIUM LACTATE, POTASSIUM CHLORIDE, CALCIUM CHLORIDE 600; 310; 30; 20 MG/100ML; MG/100ML; MG/100ML; MG/100ML
100 INJECTION, SOLUTION INTRAVENOUS CONTINUOUS
Status: DISCONTINUED | OUTPATIENT
Start: 2025-06-07 | End: 2025-06-07

## 2025-06-07 RX ORDER — GABAPENTIN 300 MG/1
300 CAPSULE ORAL EVERY 8 HOURS SCHEDULED
Status: DISPENSED | OUTPATIENT
Start: 2025-06-07

## 2025-06-07 RX ORDER — PANTOPRAZOLE SODIUM 40 MG/1
40 TABLET, DELAYED RELEASE ORAL
Status: DISCONTINUED | OUTPATIENT
Start: 2025-06-08 | End: 2025-06-07

## 2025-06-07 RX ORDER — HYDRALAZINE HYDROCHLORIDE 25 MG/1
25 TABLET, FILM COATED ORAL 3 TIMES DAILY PRN
Status: ACTIVE | OUTPATIENT
Start: 2025-06-07

## 2025-06-07 RX ORDER — HYDRALAZINE HYDROCHLORIDE 20 MG/ML
25 INJECTION INTRAMUSCULAR; INTRAVENOUS EVERY 8 HOURS PRN
Status: DISCONTINUED | OUTPATIENT
Start: 2025-06-07 | End: 2025-06-07

## 2025-06-07 RX ORDER — TRAMADOL HYDROCHLORIDE 50 MG/1
50 TABLET, FILM COATED ORAL EVERY 6 HOURS PRN
Status: DISCONTINUED | OUTPATIENT
Start: 2025-06-07 | End: 2025-06-07

## 2025-06-07 RX ORDER — CELECOXIB 100 MG/1
100 CAPSULE ORAL 2 TIMES DAILY
Status: DISPENSED | OUTPATIENT
Start: 2025-06-07

## 2025-06-07 RX ORDER — ESOMEPRAZOLE MAGNESIUM 40 MG/1
40 GRANULE, DELAYED RELEASE ORAL
Status: DISCONTINUED | OUTPATIENT
Start: 2025-06-07 | End: 2025-06-07

## 2025-06-07 RX ORDER — DOCUSATE SODIUM 100 MG/1
100 CAPSULE, LIQUID FILLED ORAL 2 TIMES DAILY
Status: DISPENSED | OUTPATIENT
Start: 2025-06-07

## 2025-06-07 RX ORDER — PANTOPRAZOLE SODIUM 40 MG/10ML
40 INJECTION, POWDER, LYOPHILIZED, FOR SOLUTION INTRAVENOUS
Status: DISCONTINUED | OUTPATIENT
Start: 2025-06-08 | End: 2025-06-07

## 2025-06-07 RX ORDER — PANTOPRAZOLE SODIUM 40 MG/1
40 TABLET, DELAYED RELEASE ORAL
Status: DISPENSED | OUTPATIENT
Start: 2025-06-07

## 2025-06-07 RX ORDER — NAPROXEN SODIUM 220 MG/1
81 TABLET, FILM COATED ORAL DAILY
Status: DISPENSED | OUTPATIENT
Start: 2025-06-07

## 2025-06-07 RX ORDER — MAGNESIUM SULFATE HEPTAHYDRATE 40 MG/ML
2 INJECTION, SOLUTION INTRAVENOUS ONCE
Status: COMPLETED | OUTPATIENT
Start: 2025-06-07 | End: 2025-06-07

## 2025-06-07 RX ADMIN — KETOROLAC TROMETHAMINE 15 MG: 15 INJECTION, SOLUTION INTRAMUSCULAR; INTRAVENOUS at 12:11

## 2025-06-07 RX ADMIN — SODIUM CHLORIDE, SODIUM LACTATE, POTASSIUM CHLORIDE, AND CALCIUM CHLORIDE 100 ML/HR: .6; .31; .03; .02 INJECTION, SOLUTION INTRAVENOUS at 00:58

## 2025-06-07 RX ADMIN — ACETAMINOPHEN 975 MG: 325 TABLET ORAL at 08:48

## 2025-06-07 RX ADMIN — ACETAMINOPHEN 975 MG: 325 TABLET ORAL at 00:52

## 2025-06-07 RX ADMIN — ASPIRIN 81 MG: 81 TABLET, CHEWABLE ORAL at 20:01

## 2025-06-07 RX ADMIN — METHOCARBAMOL 500 MG: 500 TABLET ORAL at 17:19

## 2025-06-07 RX ADMIN — HEPARIN SODIUM 5000 UNITS: 5000 INJECTION, SOLUTION INTRAVENOUS; SUBCUTANEOUS at 17:18

## 2025-06-07 RX ADMIN — MAGNESIUM SULFATE HEPTAHYDRATE 2 G: 40 INJECTION, SOLUTION INTRAVENOUS at 00:51

## 2025-06-07 RX ADMIN — CELECOXIB 100 MG: 100 CAPSULE ORAL at 17:19

## 2025-06-07 RX ADMIN — KETOROLAC TROMETHAMINE 15 MG: 15 INJECTION, SOLUTION INTRAMUSCULAR; INTRAVENOUS at 06:19

## 2025-06-07 RX ADMIN — HEPARIN SODIUM 5000 UNITS: 5000 INJECTION, SOLUTION INTRAVENOUS; SUBCUTANEOUS at 08:39

## 2025-06-07 RX ADMIN — METHOCARBAMOL 500 MG: 1000 INJECTION, SOLUTION INTRAMUSCULAR; INTRAVENOUS at 00:51

## 2025-06-07 RX ADMIN — HEPARIN SODIUM 5000 UNITS: 5000 INJECTION, SOLUTION INTRAVENOUS; SUBCUTANEOUS at 23:52

## 2025-06-07 RX ADMIN — METHOCARBAMOL 500 MG: 1000 INJECTION, SOLUTION INTRAMUSCULAR; INTRAVENOUS at 08:48

## 2025-06-07 RX ADMIN — GABAPENTIN 300 MG: 300 CAPSULE ORAL at 12:10

## 2025-06-07 RX ADMIN — NITROGLYCERIN 0.5 INCH: 20 OINTMENT TOPICAL at 07:52

## 2025-06-07 RX ADMIN — GABAPENTIN 300 MG: 300 CAPSULE ORAL at 21:22

## 2025-06-07 RX ADMIN — ACETAMINOPHEN 975 MG: 325 TABLET ORAL at 17:19

## 2025-06-07 RX ADMIN — SODIUM CHLORIDE, SODIUM LACTATE, POTASSIUM CHLORIDE, AND CALCIUM CHLORIDE 50 ML/HR: .6; .31; .03; .02 INJECTION, SOLUTION INTRAVENOUS at 15:13

## 2025-06-07 RX ADMIN — METHOCARBAMOL 500 MG: 500 TABLET ORAL at 23:52

## 2025-06-07 RX ADMIN — GABAPENTIN 300 MG: 300 CAPSULE ORAL at 03:24

## 2025-06-07 RX ADMIN — HEPARIN SODIUM 5000 UNITS: 5000 INJECTION, SOLUTION INTRAVENOUS; SUBCUTANEOUS at 00:52

## 2025-06-07 RX ADMIN — DOCUSATE SODIUM 100 MG: 100 CAPSULE, LIQUID FILLED ORAL at 20:00

## 2025-06-07 RX ADMIN — PANTOPRAZOLE SODIUM 40 MG: 40 TABLET, DELAYED RELEASE ORAL at 14:09

## 2025-06-07 ASSESSMENT — COGNITIVE AND FUNCTIONAL STATUS - GENERAL
WALKING IN HOSPITAL ROOM: A LITTLE
WALKING IN HOSPITAL ROOM: A LOT
DAILY ACTIVITIY SCORE: 19
DRESSING REGULAR LOWER BODY CLOTHING: A LITTLE
DRESSING REGULAR LOWER BODY CLOTHING: A LITTLE
TOILETING: A LITTLE
STANDING UP FROM CHAIR USING ARMS: A LOT
MOVING FROM LYING ON BACK TO SITTING ON SIDE OF FLAT BED WITH BEDRAILS: A LITTLE
DRESSING REGULAR UPPER BODY CLOTHING: A LITTLE
TOILETING: A LITTLE
DRESSING REGULAR UPPER BODY CLOTHING: A LITTLE
DAILY ACTIVITIY SCORE: 20
STANDING UP FROM CHAIR USING ARMS: A LITTLE
CLIMB 3 TO 5 STEPS WITH RAILING: A LOT
MOBILITY SCORE: 16
CLIMB 3 TO 5 STEPS WITH RAILING: A LOT
WALKING IN HOSPITAL ROOM: A LOT
MOBILITY SCORE: 14
HELP NEEDED FOR BATHING: A LITTLE
DRESSING REGULAR LOWER BODY CLOTHING: A LOT
TURNING FROM BACK TO SIDE WHILE IN FLAT BAD: A LITTLE
MOVING TO AND FROM BED TO CHAIR: A LOT
TURNING FROM BACK TO SIDE WHILE IN FLAT BAD: A LITTLE
HELP NEEDED FOR BATHING: A LITTLE
DRESSING REGULAR UPPER BODY CLOTHING: A LITTLE
HELP NEEDED FOR BATHING: A LITTLE
STANDING UP FROM CHAIR USING ARMS: A LOT
MOVING TO AND FROM BED TO CHAIR: A LITTLE
TURNING FROM BACK TO SIDE WHILE IN FLAT BAD: A LITTLE
CLIMB 3 TO 5 STEPS WITH RAILING: A LOT
TOILETING: A LITTLE
MOVING TO AND FROM BED TO CHAIR: A LITTLE

## 2025-06-07 ASSESSMENT — PAIN SCALES - GENERAL
PAINLEVEL_OUTOF10: 0 - NO PAIN
PAINLEVEL_OUTOF10: 2

## 2025-06-07 ASSESSMENT — PAIN - FUNCTIONAL ASSESSMENT
PAIN_FUNCTIONAL_ASSESSMENT: 0-10
PAIN_FUNCTIONAL_ASSESSMENT: 0-10

## 2025-06-07 NOTE — DISCHARGE INSTRUCTIONS
Plastic and Reconstructive Surgery Post Operative Instructions  You had surgery at [facility] for R unilateral BULMARO with Dr. Rowell of plastic and reconstructive surgery. Included below are post-operative instructions and details regarding follow-up.     Thank you for allowing us to participate in your care and we wish you the best!    Best Regards,  [facility]  Department of Plastic and Reconstructive Surgery    Activity:  Activity as tolerated. Start walking short distances as soon as possible, as this helps to reduce swelling and lowers the chance of blood clots. When walking, remain with your hips flexed as to limit tension on your abdominal incision. No pushing, pulling or lifting objects greater than 5 pounds.     You may not shower; you may locally bathe. Avoid soaking or submerging surgical incisions/sites or wetting wound vac dressing or device.      Surgical Site/Wound Care:  wound care recs:   - Please apply Nitrobid daily to superomedial portion of R breast. Ensure you use an applicator when applying Nitrobid paste.  - You have skin glue in place overlying your R breast incision. Leave this in place, do not pick off. It will fall off on its own. Do not scrub incisions.  - Please leave umbilical dressing in place until your follow-up appointment.    Prevena/wound vac   You are being discharge with a Prevena incisional wound vac in place over a closed surgical incision to your abdominal incision. Please allow Prevena wound vac dressing to remain in place until follow-up with plastic surgery. Do not allow the wound vac dressing or device to become wet. When resting, please make sure to plug in the device with the supplied power cord to maintain the battery. The device has a power button at the center which is encircled by green lights. There will be one less light illuminated each day (every 24 hours) which is to be expected, and signifies the count  down of the duration of the vac device. If you experience any issues with your wound vac including alarms, malfunction or dressing issues please refer to the provided instruction manual or contact the plastic surgery office at [phone].      Avoid application of creams, lotions or ointments to surgical site, no soaking or scrubbing of surgical sites.     Please do not apply ice or heat directly to the skin as feeling to the area may be diminished.    Please notify our office immediately if developing signs of infection which include increased redness, swelling, fever/chills, green/yellow drainage, or foul odor from wound. Plastic Surgery office line: [phone].      3 weeks after surgery you may begin to massage your incisions with body lotion, BioOil, or scar cream. Do not use 100% vitamin E as it can cause skin irritation.    Avoid exposing scars to sun for at least 12 months. Always use a strong sunblock if sun exposure is unavoidable (SPF 30 or greater).    Drain care:  You are being discharged with 3 YESSICA drains. Please empty and record the output every 8 hours or as needed. To empty the drain, open the cap, tip into cup and squeeze to empty. Squeeze drain flat then replace the cap.  Please empty the drain and record its output 3 times a day and bring these numbers to your follow up appointment.  The drain output should decrease and the color of the drainage should become lighter (red to pink to yellow).  This drain is sutured into place.  Keep the area around the drain clean and dry.  You may use mild soap and water to cleanse around the drain. Call the office if you notice drastic changes in drain output, bloody drain output, or redness/drainage around insertion site.    Nutrition:  You may resume a regular diet following surgery. Ensure that you are drinking an adequate amount of fluids to maintain hydration as well as consuming a diet high in protein and low in sugar. You may consider increasing your  fiber intake to avoid constipation.    Do not smoke, as smoking delays healing and increases the risk of complications. Also be sure you are not around people that smoke for at least 6 weeks after surgery.  Second hand smoke is just as harmful as if you were to smoke.    Medication Instructions:  Continue to hold your home Arimidex as it may affect wound healing. Please continue taking Aspirin 81 mg daily for 1 month post-operatively. You may resume use of your other home prescribed mediations as previously directed following discharge from the hospital. If you were taking medications prior to your surgery and they are not listed on your discharge homegoing instructions medications list, consult your MD before you resume these medications.    ***You may resume your *** anticoagulant tomorrow***.    Some postoperative pain is not unusual. This is usually relieved by taking prescribed or over the counter Acetaminophen/Tylenol, Motrin/ibuprofen. In cases of severe pain, you may use prescribed Tramadol as directed. Severe pain despite administration of pain medication must be reported to your physician.    Remember when taking Acetaminophen, do NOT exceed more than 1000 milligrams (mg) per dose or more than 4000 mg total per day. Taking too much Acetaminophen at one time can damage your liver. The maximum amount of ibuprofen in adults is 800 mg per dose or 3200 mg per day. Call your MD if you have any questions about your medications. To prevent constipation while taking narcotic pain medications, please utilize your prescribed bowel regimen, ensure that you drink plenty of water, eat fiber rich foods (a good source is fruit) and increase activity progressively.    DO NOT drive a car while utilizing narcotic pain medications and until cleared by MD at follow-up appointment. Driving or operating heavy machinery, lawnmowers or power tools while taking opiod/narcotic pain medications may impair your judgement.    Call  Physician If:  Call your MD or seek immediate medical attention if you experience any of the following symptoms:  1. Fever of 101.5 (38.5 C) or greater  2. Pain not controlled with prescribed pain medications  3. Uncontrolled nausea and/or vomiting  4. Drainage or swelling around your incisions and/or surgical sites   5. Separation of incisions, or tearing of the incision line  6. Large fluid collection under or around the incision or flap sites   7. Flap discoloration (including darkened appearance)  8. Difficulty breathing  9. Swelling, pain, heat and/or redness in your legs and/or calves  10. Inability to tolerate diet/fluid intake    Contact the plastic surgery office for any questions and/or concerns regarding the surgical incision/site.  1. [phone] if Monday-Friday (8 a.m. - 4:30 p.m.)  2. [phone] and ask for the Plastic Surgeon bharath if after hours or on weekends  3. Please send a picture with any wound concerns to our plastic surgery email at [email or web address]    Follow-up/Post Discharge Appointments:  Follow-up care is a key part of your treatment and safety. It is very important that you maintain follow-up care as directed so that your surgical site heals properly and does not lead to problems. Always carry a current medication list with you and bring it to ALL healthcare Provider visits. Be sure to maintain follow up with plastic surgery at your scheduled appointment. If you are unable to keep your appointment, or need to reschedule please contact our office at [phone].    Follow up scheduled for 6/23    unable to keep your appointment, or need to reschedule please contact our office at [phone].    Follow up scheduled for 6/16 at 9:30am with Dr. Liz.

## 2025-06-07 NOTE — PROGRESS NOTES
Postop Pain HPI -   Palliative: relieved with IV analgesics and regional local anesthetics  Provocative: movement  Quality:  burning and aching  Radiation:  none  Severity:  0/10  Timing: constant    24-HOUR OPIOID CONSUMPTION:  No oxycodone or dilaudid    Scheduled medications  Scheduled Medications[1]  Continuous medications  Continuous Medications[2]  PRN medications  PRN Medications[3]     Physical Exam:  Constitutional:  no distress, alert and cooperative  Eyes: clear sclera  Head/Neck: No apparent injury, trachea midline  Respiratory/Thorax: Patent airways, thorax symmetric, breathing comfortably  Cardiovascular: no pitting edema  Gastrointestinal: Nondistended  Musculoskeletal: ROM intact  Extremities: no clubbing  Neurological: alert, bernstein x4  Psychological: Appropriate affect    Results for orders placed or performed during the hospital encounter of 06/06/25 (from the past 24 hours)   BLOOD GAS ARTERIAL FULL PANEL   Result Value Ref Range    POCT pH, Arterial 7.36 (L) 7.38 - 7.42 pH    POCT pCO2, Arterial 33 (L) 38 - 42 mm Hg    POCT pO2, Arterial 112 (H) 85 - 95 mm Hg    POCT SO2, Arterial 99 94 - 100 %    POCT Oxy Hemoglobin, Arterial 96.8 94.0 - 98.0 %    POCT Hematocrit Calculated, Arterial 34.0 (L) 36.0 - 46.0 %    POCT Sodium, Arterial 139 136 - 145 mmol/L    POCT Potassium, Arterial 2.6 (LL) 3.5 - 5.3 mmol/L    POCT Chloride, Arterial 112 (H) 98 - 107 mmol/L    POCT Ionized Calcium, Arterial 0.96 (L) 1.10 - 1.33 mmol/L    POCT Glucose, Arterial 122 (H) 74 - 99 mg/dL    POCT Lactate, Arterial 1.4 0.4 - 2.0 mmol/L    POCT Base Excess, Arterial -6.0 (L) -2.0 - 3.0 mmol/L    POCT HCO3 Calculated, Arterial 18.6 (L) 22.0 - 26.0 mmol/L    POCT Hemoglobin, Arterial 11.3 (L) 12.0 - 16.0 g/dL    POCT Anion Gap, Arterial 11 10 - 25 mmo/L    Patient Temperature 37.0 degrees Celsius    FiO2 40 %   Blood Gas Arterial Full Panel   Result Value Ref Range    POCT pH, Arterial 7.37 (L) 7.38 - 7.42 pH    POCT pCO2,  Arterial 36 (L) 38 - 42 mm Hg    POCT pO2, Arterial 108 (H) 85 - 95 mm Hg    POCT SO2, Arterial 99 94 - 100 %    POCT Oxy Hemoglobin, Arterial 96.7 94.0 - 98.0 %    POCT Hematocrit Calculated, Arterial 34.0 (L) 36.0 - 46.0 %    POCT Sodium, Arterial 138 136 - 145 mmol/L    POCT Potassium, Arterial 2.9 (LL) 3.5 - 5.3 mmol/L    POCT Chloride, Arterial 108 (H) 98 - 107 mmol/L    POCT Ionized Calcium, Arterial 1.08 (L) 1.10 - 1.33 mmol/L    POCT Glucose, Arterial 142 (H) 74 - 99 mg/dL    POCT Lactate, Arterial 1.8 0.4 - 2.0 mmol/L    POCT Base Excess, Arterial -4.0 (L) -2.0 - 3.0 mmol/L    POCT HCO3 Calculated, Arterial 20.8 (L) 22.0 - 26.0 mmol/L    POCT Hemoglobin, Arterial 11.3 (L) 12.0 - 16.0 g/dL    POCT Anion Gap, Arterial 12 10 - 25 mmo/L    Patient Temperature 37.0 degrees Celsius    FiO2 40 %   Blood Gas Arterial Full Panel   Result Value Ref Range    POCT pH, Arterial 7.35 (L) 7.38 - 7.42 pH    POCT pCO2, Arterial 37 (L) 38 - 42 mm Hg    POCT pO2, Arterial 151 (H) 85 - 95 mm Hg    POCT SO2, Arterial 100 94 - 100 %    POCT Oxy Hemoglobin, Arterial 97.3 94.0 - 98.0 %    POCT Hematocrit Calculated, Arterial 36.0 36.0 - 46.0 %    POCT Sodium, Arterial 137 136 - 145 mmol/L    POCT Potassium, Arterial 3.2 (L) 3.5 - 5.3 mmol/L    POCT Chloride, Arterial 108 (H) 98 - 107 mmol/L    POCT Ionized Calcium, Arterial 1.12 1.10 - 1.33 mmol/L    POCT Glucose, Arterial 155 (H) 74 - 99 mg/dL    POCT Lactate, Arterial 3.3 (H) 0.4 - 2.0 mmol/L    POCT Base Excess, Arterial -4.7 (L) -2.0 - 3.0 mmol/L    POCT HCO3 Calculated, Arterial 20.4 (L) 22.0 - 26.0 mmol/L    POCT Hemoglobin, Arterial 12.1 12.0 - 16.0 g/dL    POCT Anion Gap, Arterial 12 10 - 25 mmo/L    Patient Temperature 37.0 degrees Celsius    FiO2 60 %   Blood Gas Arterial Full Panel   Result Value Ref Range    POCT pH, Arterial 7.35 (L) 7.38 - 7.42 pH    POCT pCO2, Arterial 40 38 - 42 mm Hg    POCT pO2, Arterial 161 (H) 85 - 95 mm Hg    POCT SO2, Arterial 99 94 - 100 %     POCT Oxy Hemoglobin, Arterial 97.1 94.0 - 98.0 %    POCT Hematocrit Calculated, Arterial 31.0 (L) 36.0 - 46.0 %    POCT Sodium, Arterial 134 (L) 136 - 145 mmol/L    POCT Potassium, Arterial 4.2 3.5 - 5.3 mmol/L    POCT Chloride, Arterial 104 98 - 107 mmol/L    POCT Ionized Calcium, Arterial 1.16 1.10 - 1.33 mmol/L    POCT Glucose, Arterial 148 (H) 74 - 99 mg/dL    POCT Lactate, Arterial 3.9 (H) 0.4 - 2.0 mmol/L    POCT Base Excess, Arterial -3.3 (L) -2.0 - 3.0 mmol/L    POCT HCO3 Calculated, Arterial 22.1 22.0 - 26.0 mmol/L    POCT Hemoglobin, Arterial 10.4 (L) 12.0 - 16.0 g/dL    POCT Anion Gap, Arterial 12 10 - 25 mmo/L    Patient Temperature 37.0 degrees Celsius    FiO2 50 %   CBC   Result Value Ref Range    WBC 13.4 (H) 4.4 - 11.3 x10*3/uL    nRBC 0.0 0.0 - 0.0 /100 WBCs    RBC 3.18 (L) 4.00 - 5.20 x10*6/uL    Hemoglobin 10.4 (L) 12.0 - 16.0 g/dL    Hematocrit 31.1 (L) 36.0 - 46.0 %    MCV 98 80 - 100 fL    MCH 32.7 26.0 - 34.0 pg    MCHC 33.4 32.0 - 36.0 g/dL    RDW 12.6 11.5 - 14.5 %    Platelets 135 (L) 150 - 450 x10*3/uL   Renal function panel   Result Value Ref Range    Glucose 163 (H) 74 - 99 mg/dL    Sodium 139 136 - 145 mmol/L    Potassium 3.6 3.5 - 5.3 mmol/L    Chloride 104 98 - 107 mmol/L    Bicarbonate 23 21 - 32 mmol/L    Anion Gap 16 10 - 20 mmol/L    Urea Nitrogen 8 6 - 23 mg/dL    Creatinine 0.63 0.50 - 1.05 mg/dL    eGFR >90 >60 mL/min/1.73m*2    Calcium 8.7 8.6 - 10.6 mg/dL    Phosphorus 2.8 2.5 - 4.9 mg/dL    Albumin 4.7 3.4 - 5.0 g/dL   Magnesium   Result Value Ref Range    Magnesium 1.52 (L) 1.60 - 2.40 mg/dL   CBC and Auto Differential   Result Value Ref Range    WBC 10.1 4.4 - 11.3 x10*3/uL    nRBC 0.0 0.0 - 0.0 /100 WBCs    RBC 2.93 (L) 4.00 - 5.20 x10*6/uL    Hemoglobin 9.7 (L) 12.0 - 16.0 g/dL    Hematocrit 30.9 (L) 36.0 - 46.0 %     (H) 80 - 100 fL    MCH 33.1 26.0 - 34.0 pg    MCHC 31.4 (L) 32.0 - 36.0 g/dL    RDW 13.0 11.5 - 14.5 %    Platelets 119 (L) 150 - 450 x10*3/uL     Neutrophils % 83.6 40.0 - 80.0 %    Immature Granulocytes %, Automated 0.4 0.0 - 0.9 %    Lymphocytes % 7.1 13.0 - 44.0 %    Monocytes % 8.8 2.0 - 10.0 %    Eosinophils % 0.0 0.0 - 6.0 %    Basophils % 0.1 0.0 - 2.0 %    Neutrophils Absolute 8.42 (H) 1.20 - 7.70 x10*3/uL    Immature Granulocytes Absolute, Automated 0.04 0.00 - 0.70 x10*3/uL    Lymphocytes Absolute 0.72 (L) 1.20 - 4.80 x10*3/uL    Monocytes Absolute 0.89 0.10 - 1.00 x10*3/uL    Eosinophils Absolute 0.00 0.00 - 0.70 x10*3/uL    Basophils Absolute 0.01 0.00 - 0.10 x10*3/uL   Basic metabolic panel   Result Value Ref Range    Glucose 112 (H) 74 - 99 mg/dL    Sodium 143 136 - 145 mmol/L    Potassium 3.6 3.5 - 5.3 mmol/L    Chloride 108 (H) 98 - 107 mmol/L    Bicarbonate 23 21 - 32 mmol/L    Anion Gap 16 10 - 20 mmol/L    Urea Nitrogen 10 6 - 23 mg/dL    Creatinine 0.70 0.50 - 1.05 mg/dL    eGFR >90 >60 mL/min/1.73m*2    Calcium 9.0 8.6 - 10.6 mg/dL      Yvrose John is a 60 y.o. year old female patient with PMH of right breast cancer, T2 N1 M0 invasive ductal carcinoma, metastasized to right axillary lymph node s/p Right lumpectomy with removal of 18 lymph nodes 2022 and chemo/radiation with continue breast symptoms who presents for Right simple mastectomy with reconstruction with free flap with Bel Maldonado and Phil Crockett on 6/6/25. Acute Pain consulted for block for postoperative pain control.      Plan:  - Right paravertebral block with catheter performed preoperatively on 6/6/25  - Ambit ball with Ropivacaine 0.2%/NaCl 0.9% 500mL, Rate 10 cc/hr   - Ambit medication will not interfere with pain medication prescribed by the primary team.   - Please be aware of local anesthetic toxic dose and absorption variability before considering lidocaine patches  - Acute pain service will follow while catheter in place  - Rest of pain management per primary team     Acute Pain Team  pg [phone] ph [phone].        [1] acetaminophen, 975 mg, oral, q8h HAYLEY  [Held by  provider] anastrozole, 1 mg, oral, Daily  aspirin, 81 mg, oral, Daily  docusate sodium, 100 mg, oral, BID  gabapentin, 300 mg, oral, q8h HAYLEY  heparin (porcine), 5,000 Units, subcutaneous, q8h  ketorolac, 15 mg, intravenous, q6h HAYLEY  methocarbamol, 500 mg, oral, q6h HAYLEY  nitroglycerin, 0.5 inch, transdermal, Daily  [2] lactated Ringer's, 125 mL/hr, Last Rate: 125 mL/hr (06/07/25 1019)  ropivacaine (PF) in NS cmpd, 10 mL/hr, Last Rate: 7 mL/hr (06/06/25 1929)  [3] PRN medications: ketorolac

## 2025-06-07 NOTE — CARE PLAN
The patient's goals for the shift include      Problem: Pain - Adult  Goal: Verbalizes/displays adequate comfort level or baseline comfort level  Outcome: Progressing     Problem: Safety - Adult  Goal: Free from fall injury  Outcome: Progressing     Problem: Discharge Planning  Goal: Discharge to home or other facility with appropriate resources  Outcome: Progressing     Problem: Chronic Conditions and Co-morbidities  Goal: Patient's chronic conditions and co-morbidity symptoms are monitored and maintained or improved  Outcome: Progressing     Problem: Nutrition  Goal: Nutrient intake appropriate for maintaining nutritional needs  Outcome: Progressing     Problem: Skin  Goal: Decreased wound size/increased tissue granulation at next dressing change  Outcome: Progressing  Goal: Participates in plan/prevention/treatment measures  Outcome: Progressing  Goal: Prevent/manage excess moisture  Outcome: Progressing  Goal: Prevent/minimize sheer/friction injuries  Outcome: Progressing  Goal: Promote/optimize nutrition  Outcome: Progressing  Goal: Promote skin healing  Outcome: Progressing

## 2025-06-07 NOTE — SIGNIFICANT EVENT
Update:    Patient seen and examined this morning status post right mastectomy (breast surgery) and unilateral BULMARO flap reconstruction (plastic surgery). Now POD #1.     She overall feels well. She endorses swelling of LUE that started overnight. Denies pain associated with arm swelling. Pain is well controlled on current regimen.     On exam, YESSICA drains with minimal serosanguinous (more serous) output. Drains do not appear sinister. Wound vac in place holding good suction. Vioptics 71%.     Vitals and labs reviewed. No acute concerns.     Plan/Recommendations:  - defer remainder of patient care to plastic surgery colleagues  - follow up as needed  - reach out with questions or concerns    D/w attending Dr. Rock Leigh Roper MD  General Surgery Resident  Surgical Oncology  [facility] [phone]

## 2025-06-07 NOTE — PROGRESS NOTES
"  Department of Plastic and Reconstructive Surgery  Post Op Check    Patient Name: Yvrose John  MRN: [id number]  Date:  06/06/25     Subjective  Patient resting comfortably in PACU.  Pain has reduced from a 7/10 to 4/10 with IV Robaxin. Nausea is now under control. Of note, she has been experiencing left thumb and left pointer finger numbness since post procedure. This is unchanged. Doppler sounds are strong along the most lateral and central perforators. Vioptix is averaging 67% on 98 signal strength. Three YESSICA drains and wound vac is to suction without leak. Denies any fever, chills, night sweats, CP, SOB, palpitations, nausea or vomiting.       Overnight Events  None     Objective    Vital Signs  /84   Pulse 77   Temp 37.2 °C (99 °F) (Temporal)   Resp 16   Ht 1.6 m (5' 3\")   Wt 91 kg (200 lb 9.9 oz)   SpO2 98%   BMI 35.54 kg/m²      Constitutional: A&Ox3, calm and cooperative, NAD.  Eyes: EOMI, clear sclera.  Head/Neck: Neck supple, trachea midline.  Cardiovascular: RRR. 2+ equal pulses of the distal extremities.   Respiratory/Thorax: Good symmetric chest expansion, breathing comfortably on NC   Gastrointestinal: Abdomen soft, ND/NT.   Neurological: A&Ox3, cranial nerves II-XII grossly intact. No gross neuro deficits.   Psychological: Appropriate mood and behavior.  Skin: Warm and dry, no rashes or lesions. See dedicated flap/surgical site exam.   Flap/surgical sites: BULMARO flap recipient site to Right breast which is appropriate color, warm to touch, soft. Flap approximated with intact sutures without evidence of bleeding or dehiscence. Intact Doppler pulse at marked site. Flap edges which are intact. Nitrobid applied to superior portion of the right breast above the incision line/radiated skin. Flap donor site of lower abdomen which is dressed with  incisional wound vac connected to hospital vac device. Wound vac dressing intact and device without issue. Maintaining continuous suction at 125 " mmHg without alarm, leak, obstruction or malfunction. No output appreciated in collecting canister. 3 YESSICA drain to right and left lower abdomen and inferior to right breast with moderate bloody serosanguinous output. Umbilicus dressed with Nitrobid , Aquacel and Tegaderm.     Diagnostics   Results for orders placed or performed during the hospital encounter of 06/06/25 (from the past 24 hours)   BLOOD GAS ARTERIAL FULL PANEL   Result Value Ref Range    POCT pH, Arterial 7.36 (L) 7.38 - 7.42 pH    POCT pCO2, Arterial 33 (L) 38 - 42 mm Hg    POCT pO2, Arterial 112 (H) 85 - 95 mm Hg    POCT SO2, Arterial 99 94 - 100 %    POCT Oxy Hemoglobin, Arterial 96.8 94.0 - 98.0 %    POCT Hematocrit Calculated, Arterial 34.0 (L) 36.0 - 46.0 %    POCT Sodium, Arterial 139 136 - 145 mmol/L    POCT Potassium, Arterial 2.6 (LL) 3.5 - 5.3 mmol/L    POCT Chloride, Arterial 112 (H) 98 - 107 mmol/L    POCT Ionized Calcium, Arterial 0.96 (L) 1.10 - 1.33 mmol/L    POCT Glucose, Arterial 122 (H) 74 - 99 mg/dL    POCT Lactate, Arterial 1.4 0.4 - 2.0 mmol/L    POCT Base Excess, Arterial -6.0 (L) -2.0 - 3.0 mmol/L    POCT HCO3 Calculated, Arterial 18.6 (L) 22.0 - 26.0 mmol/L    POCT Hemoglobin, Arterial 11.3 (L) 12.0 - 16.0 g/dL    POCT Anion Gap, Arterial 11 10 - 25 mmo/L    Patient Temperature 37.0 degrees Celsius    FiO2 40 %   Blood Gas Arterial Full Panel   Result Value Ref Range    POCT pH, Arterial 7.37 (L) 7.38 - 7.42 pH    POCT pCO2, Arterial 36 (L) 38 - 42 mm Hg    POCT pO2, Arterial 108 (H) 85 - 95 mm Hg    POCT SO2, Arterial 99 94 - 100 %    POCT Oxy Hemoglobin, Arterial 96.7 94.0 - 98.0 %    POCT Hematocrit Calculated, Arterial 34.0 (L) 36.0 - 46.0 %    POCT Sodium, Arterial 138 136 - 145 mmol/L    POCT Potassium, Arterial 2.9 (LL) 3.5 - 5.3 mmol/L    POCT Chloride, Arterial 108 (H) 98 - 107 mmol/L    POCT Ionized Calcium, Arterial 1.08 (L) 1.10 - 1.33 mmol/L    POCT Glucose, Arterial 142 (H) 74 - 99 mg/dL    POCT Lactate, Arterial  1.8 0.4 - 2.0 mmol/L    POCT Base Excess, Arterial -4.0 (L) -2.0 - 3.0 mmol/L    POCT HCO3 Calculated, Arterial 20.8 (L) 22.0 - 26.0 mmol/L    POCT Hemoglobin, Arterial 11.3 (L) 12.0 - 16.0 g/dL    POCT Anion Gap, Arterial 12 10 - 25 mmo/L    Patient Temperature 37.0 degrees Celsius    FiO2 40 %   Blood Gas Arterial Full Panel   Result Value Ref Range    POCT pH, Arterial 7.35 (L) 7.38 - 7.42 pH    POCT pCO2, Arterial 37 (L) 38 - 42 mm Hg    POCT pO2, Arterial 151 (H) 85 - 95 mm Hg    POCT SO2, Arterial 100 94 - 100 %    POCT Oxy Hemoglobin, Arterial 97.3 94.0 - 98.0 %    POCT Hematocrit Calculated, Arterial 36.0 36.0 - 46.0 %    POCT Sodium, Arterial 137 136 - 145 mmol/L    POCT Potassium, Arterial 3.2 (L) 3.5 - 5.3 mmol/L    POCT Chloride, Arterial 108 (H) 98 - 107 mmol/L    POCT Ionized Calcium, Arterial 1.12 1.10 - 1.33 mmol/L    POCT Glucose, Arterial 155 (H) 74 - 99 mg/dL    POCT Lactate, Arterial 3.3 (H) 0.4 - 2.0 mmol/L    POCT Base Excess, Arterial -4.7 (L) -2.0 - 3.0 mmol/L    POCT HCO3 Calculated, Arterial 20.4 (L) 22.0 - 26.0 mmol/L    POCT Hemoglobin, Arterial 12.1 12.0 - 16.0 g/dL    POCT Anion Gap, Arterial 12 10 - 25 mmo/L    Patient Temperature 37.0 degrees Celsius    FiO2 60 %   Blood Gas Arterial Full Panel   Result Value Ref Range    POCT pH, Arterial 7.35 (L) 7.38 - 7.42 pH    POCT pCO2, Arterial 40 38 - 42 mm Hg    POCT pO2, Arterial 161 (H) 85 - 95 mm Hg    POCT SO2, Arterial 99 94 - 100 %    POCT Oxy Hemoglobin, Arterial 97.1 94.0 - 98.0 %    POCT Hematocrit Calculated, Arterial 31.0 (L) 36.0 - 46.0 %    POCT Sodium, Arterial 134 (L) 136 - 145 mmol/L    POCT Potassium, Arterial 4.2 3.5 - 5.3 mmol/L    POCT Chloride, Arterial 104 98 - 107 mmol/L    POCT Ionized Calcium, Arterial 1.16 1.10 - 1.33 mmol/L    POCT Glucose, Arterial 148 (H) 74 - 99 mg/dL    POCT Lactate, Arterial 3.9 (H) 0.4 - 2.0 mmol/L    POCT Base Excess, Arterial -3.3 (L) -2.0 - 3.0 mmol/L    POCT HCO3 Calculated, Arterial 22.1  22.0 - 26.0 mmol/L    POCT Hemoglobin, Arterial 10.4 (L) 12.0 - 16.0 g/dL    POCT Anion Gap, Arterial 12 10 - 25 mmo/L    Patient Temperature 37.0 degrees Celsius    FiO2 50 %   CBC   Result Value Ref Range    WBC 13.4 (H) 4.4 - 11.3 x10*3/uL    nRBC 0.0 0.0 - 0.0 /100 WBCs    RBC 3.18 (L) 4.00 - 5.20 x10*6/uL    Hemoglobin 10.4 (L) 12.0 - 16.0 g/dL    Hematocrit 31.1 (L) 36.0 - 46.0 %    MCV 98 80 - 100 fL    MCH 32.7 26.0 - 34.0 pg    MCHC 33.4 32.0 - 36.0 g/dL    RDW 12.6 11.5 - 14.5 %    Platelets 135 (L) 150 - 450 x10*3/uL     Imaging  No results found.    Cardiology, Vascular, and Other Imaging  No other imaging results found for the past 7 days      Current Medications  Scheduled medications  Scheduled Medications[1]  Continuous medications  Continuous Medications[2]  PRN medications  PRN Medications[3]     Assessment  Yvrose John is a 60 y.o. female with a past medical history of heart murmur, MVP and of right breast cancer that metastasized to right axillary lymph node. Right lumpectomy and excision of 18 lymph nodes 11/30/22 with Dr. Maldonado. Following surgery, she completed 29 radiation treatments by March 2023 and 20 chemo treatments by October 2023. S/p Right mastectomy with unilateral BULMARO reconstruction with Dr Piedra and Dr Liz on 6/6/2025.       Plan/Recommendations  # S/p Right Mastectomy with BULMARO Reconstruction    - Continue serial flap assessments Q1hour per nursing and Q2hour per plastic surgery team       ·  Assess Doppler pulse at marked site plus flap appearance (color, warmth, turgor)       ·  Nursing to notify plastic surgery team immediately if there are any acute changes          (Including decreased Doppler signal, pallor, temperature change, bleeding, etc.)  - Continue use of Raz hugger to right breasts       ·  Settings: low heat, medium continuous fan        ·  5 hours on, one hour off x 3 days post op   - Daily local wound care/dressing changes per plastic surgery APPs  (Xeroform over incision lines)       ·  Monitor surgical sites for S/S of bleeding, infection or dehiscence   - Umbilicus dressed with Nitrobid, Aquacel and Tegaderm, keep C/D/I. Plastics PHYLLIS to monitor daily   - Continue YESSICA drain care per nursing (three YESSICA drains)        ·  Strip drain tubing TID and PRN       ·  Monitor and record output [id number]        ·  Keep drain sites C/D/I with daily drain dressing changes        ·  Call plastics if drain output is >30cc in an hour or greater than 200cc over 8 hours  - Continue incisional wound vac therapy to abdomen x10-14 days post op        ·  Settings: 125 mmHg low continuous suction        ·  Please keep dressing C/D/I, reinforce PRN        ·  Record vac output per nursing q8h       ·  Please alert plastics team with any concerns regarding vac        ·  Plan to transition to Prevena homegoing vac on day of discharge    - Keep right breast flap carefully positioned without pressure         ·  Avoid positioning that would apply pressure to flap region or incisions   - patient to maintain beach chair position when sitting, laying, standing and ambulating.    - patient can begin ambulating POD#1 after removal of verma  - Maintain Verma while on bedrest with anticipated removal on POD#1-2   - NPO until POD#1       ·  NS @ 100 mL while NPO and until adequate PO intake        ·  Strict monitoring and recording of I&Os per nursing   - Follow up post op CBC/RFP    - Maintain BP of 110/60 minimum to ensure adequate flap perfusion   - Low transfusion threshold to ensure adequate flap perfusion  - SQ Heparin [id number] resumed postoperatively, transition to Lovenox on POD#3    - Daily ASA to start on POD #1 and continue x30 days post op (end date July 6th 2025 )   - Monitor VS/pulse ox [id number]   - Monitor AM CBC/RFP/Mg       # Acute postoperative pain  - Well controlled per patient, continue current regimen        ·  Scheduled Tylenol 975 mg PO [id number] for mild pain       ·  Scheduled  Gabapentin 300 mg [id number] for mild/moderate pain       -  Scheduled Robaxin 500 mg [id number] for mild/moderate pain       - Scheduled Toradol 15 mg [id number] for moderate pain       ·  Oxycodone 5mg PO [id number] PRN mod/severe pain   - Bowel regimen with Colace 100 mg PO BID while using narcotics  - IV Zofran PRN nausea due to narcotics   - Pain assessments [id number]     # Anemia   - Historical baseline HGB during prior admission/Incoming HGB on admission 12.0  - HGB on AM/postoperative labs at 10.4   - Presently no S/S of bleeding, considered likely due to intraoperative blood loss   - Keep T&S UTD, last obtained 5/23  - Monitor for S/S of bleeding or symptomatic anemia   - Low transfusion threshold to ensure adequate flap perfusion  - Transfuse for HGB <7 per protocol   - Monitor AM CBC     # No Hx HTN   - Maintain BP of 110/60 minimum to ensure adequate flap perfusion        ·  Please avoid use of pressors as able               -> Recommended use of dobutamine and norepinephrine as indicated only        ·  For HTN, please avoid hydralazine if possible               -> Prefer use of calcium channel blockers as indicated for HTN (amlodipine)    # Hx of Breast Cancer  - Currently stable, continue to hold home Arimidex due to wound healing risks     Prophylaxis:   - DVT: SQ heparin [id number] (plan to transition to SQ Lovenox on POD#3), ASA 81 to start/resume POD#1 then continue once daily, SCDs  - Encourage IS x10 every hour while awake   - Bowel regimen: Colace 100mg BID        Disposition: Continue care on RNF. Will remain inpatient for continued flap monitoring and vac/drain surveillance postoperatively. Follow up appointment with plastic surgery department to be scheduled closer to anticipated date of discharge.     Patient evaluated and plan discussed with Dr Tania VALNETIN    Plastic and Reconstructive Surgery   Zenaida  Pager #[phone]  Team phone: [phone]           [1] acetaminophen, 975 mg, oral, q8h  HAYLEY  lidocaine, 0.1 mL, subcutaneous, Once  [2] lactated Ringer's, 100 mL/hr, Last Rate: 100 mL/hr (06/06/25 2130)  ropivacaine (PF) in NS cmpd, 10 mL/hr, Last Rate: 7 mL/hr (06/06/25 1929)  [3] PRN medications: albuterol, droperidol, hydrALAZINE, HYDROmorphone, HYDROmorphone, labetaloL, oxyCODONE, oxygen

## 2025-06-07 NOTE — ANESTHESIA POSTPROCEDURE EVALUATION
Patient: Yvrose John    Procedure Summary       Date: 06/06/25 Room / Location: [facility] OR 06 / [facility] OR    Anesthesia Start: 0726 Anesthesia Stop: 2057    Procedures:       RECONSTRUCTION, BREAST, USING FREE FLAP (Right: Breast)      PREPARATION, RECIPIENT SITE, BY OPEN WOUND EXCISION, INITIAL 100 SQ CM OR LESS (Right: Breast)      BIOPSY, LYMPH NODE, MAMMARY (Right)      MASTECTOMY, UNILATERAL, RIGHT BREAST (Right: Breast) Diagnosis:       Lymphedema of breast      Acquired breast deformity      Breast pain      (Lymphedema of breast [I89.0])      (Acquired breast deformity [N64.89])      (Breast pain [N64.4])    Surgeons: Cornelius Liz MD; Regi Maldonado MD Responsible Provider: Meena Larson MD    Anesthesia Type: general ASA Status: 3            Anesthesia Type: general    Vitals Value Taken Time   /56 06/06/25 20:57   Temp 37 06/06/25 20:57   Pulse 106 06/06/25 20:53   Resp 18 06/06/25 20:53   SpO2 94 % 06/06/25 20:53   Vitals shown include unfiled device data.    Anesthesia Post Evaluation    Patient location during evaluation: PACU  Patient participation: complete - patient participated  Level of consciousness: awake and alert  Pain score: 0  Pain management: adequate  Multimodal analgesia pain management approach  Airway patency: patent  Cardiovascular status: acceptable and blood pressure returned to baseline  Respiratory status: face mask  Hydration status: acceptable  Postoperative Nausea and Vomiting: none        No notable events documented.

## 2025-06-07 NOTE — PROGRESS NOTES
"  Department of Plastic and Reconstructive Surgery  Progress Note    Patient Name: Yvrose John  MRN: [id number]  Date:  06/07/25     Subjective  Patient resting comfortably in bed on exam. She reports 2/10 achy pain to her abdominal incision, otherwise pain overall well-controlled. She still endorses constant, stable left thumb and left pointer finger numbness following the procedure, denies pain. Doppler sounds are strong. Vioptix is averaging 75% on 98 signal strength. Three YESSICA drains in place, and wound vac is to suction without leak. Denies any fever, chills, night sweats, CP, SOB, palpitations, nausea or vomiting.     Overnight Events  None     Objective    Vital Signs  /50 (BP Location: Left arm, Patient Position: 5 min laying)   Pulse 91   Temp 37.4 °C (99.4 °F) (Temporal)   Resp 16   Ht 1.6 m (5' 2.99\")   Wt 97.2 kg (214 lb 4.6 oz)   SpO2 95%   BMI 37.97 kg/m²      Constitutional: A&Ox3, calm and cooperative, NAD.  Eyes: EOMI, clear sclera.  Head/Neck: Neck supple, trachea midline.  Cardiovascular: Regular rate.  Respiratory/Thorax: Good symmetric chest expansion, breathing comfortably on NC   Gastrointestinal: Abdomen soft, ND/NT.   Neurological: A&Ox3, cranial nerves II-XII grossly intact  Psychological: Appropriate mood and behavior.  MSK: ROM intact, ATKINS actively. LUE with nonpitting edema noted to L hand and forearm, radial and ulnar pulses palpable.  Skin: Warm and dry, no rashes or lesions. See dedicated flap/surgical site exam.   Flap/surgical sites: BULMARO flap recipient site to Right breast which is appropriate color, warm to touch, soft. Flap approximated with intact sutures without evidence of bleeding or dehiscence. Intact Doppler pulse at marked site. Flap edges which are intact. Nitrobid applied to superior portion of the right breast above the incision line/radiated skin. Flap donor site of lower abdomen which is dressed with  incisional wound vac connected to hospital vac " device. Wound vac dressing intact and device maintaining continuous suction at 125 mmHg without alarm, leak, obstruction or malfunction. No output appreciated in collecting canister. 3 YESSICA drains (1 to RLQ, 1 to LLQ, and 1 to right breast) with moderate serosanguinous output in bulbs. Umbilicus dressed with Nitrobid, Aquacel and Tegaderm.     Diagnostics   Results for orders placed or performed during the hospital encounter of 06/06/25 (from the past 24 hours)   BLOOD GAS ARTERIAL FULL PANEL   Result Value Ref Range    POCT pH, Arterial 7.36 (L) 7.38 - 7.42 pH    POCT pCO2, Arterial 33 (L) 38 - 42 mm Hg    POCT pO2, Arterial 112 (H) 85 - 95 mm Hg    POCT SO2, Arterial 99 94 - 100 %    POCT Oxy Hemoglobin, Arterial 96.8 94.0 - 98.0 %    POCT Hematocrit Calculated, Arterial 34.0 (L) 36.0 - 46.0 %    POCT Sodium, Arterial 139 136 - 145 mmol/L    POCT Potassium, Arterial 2.6 (LL) 3.5 - 5.3 mmol/L    POCT Chloride, Arterial 112 (H) 98 - 107 mmol/L    POCT Ionized Calcium, Arterial 0.96 (L) 1.10 - 1.33 mmol/L    POCT Glucose, Arterial 122 (H) 74 - 99 mg/dL    POCT Lactate, Arterial 1.4 0.4 - 2.0 mmol/L    POCT Base Excess, Arterial -6.0 (L) -2.0 - 3.0 mmol/L    POCT HCO3 Calculated, Arterial 18.6 (L) 22.0 - 26.0 mmol/L    POCT Hemoglobin, Arterial 11.3 (L) 12.0 - 16.0 g/dL    POCT Anion Gap, Arterial 11 10 - 25 mmo/L    Patient Temperature 37.0 degrees Celsius    FiO2 40 %   Blood Gas Arterial Full Panel   Result Value Ref Range    POCT pH, Arterial 7.37 (L) 7.38 - 7.42 pH    POCT pCO2, Arterial 36 (L) 38 - 42 mm Hg    POCT pO2, Arterial 108 (H) 85 - 95 mm Hg    POCT SO2, Arterial 99 94 - 100 %    POCT Oxy Hemoglobin, Arterial 96.7 94.0 - 98.0 %    POCT Hematocrit Calculated, Arterial 34.0 (L) 36.0 - 46.0 %    POCT Sodium, Arterial 138 136 - 145 mmol/L    POCT Potassium, Arterial 2.9 (LL) 3.5 - 5.3 mmol/L    POCT Chloride, Arterial 108 (H) 98 - 107 mmol/L    POCT Ionized Calcium, Arterial 1.08 (L) 1.10 - 1.33 mmol/L     POCT Glucose, Arterial 142 (H) 74 - 99 mg/dL    POCT Lactate, Arterial 1.8 0.4 - 2.0 mmol/L    POCT Base Excess, Arterial -4.0 (L) -2.0 - 3.0 mmol/L    POCT HCO3 Calculated, Arterial 20.8 (L) 22.0 - 26.0 mmol/L    POCT Hemoglobin, Arterial 11.3 (L) 12.0 - 16.0 g/dL    POCT Anion Gap, Arterial 12 10 - 25 mmo/L    Patient Temperature 37.0 degrees Celsius    FiO2 40 %   Blood Gas Arterial Full Panel   Result Value Ref Range    POCT pH, Arterial 7.35 (L) 7.38 - 7.42 pH    POCT pCO2, Arterial 37 (L) 38 - 42 mm Hg    POCT pO2, Arterial 151 (H) 85 - 95 mm Hg    POCT SO2, Arterial 100 94 - 100 %    POCT Oxy Hemoglobin, Arterial 97.3 94.0 - 98.0 %    POCT Hematocrit Calculated, Arterial 36.0 36.0 - 46.0 %    POCT Sodium, Arterial 137 136 - 145 mmol/L    POCT Potassium, Arterial 3.2 (L) 3.5 - 5.3 mmol/L    POCT Chloride, Arterial 108 (H) 98 - 107 mmol/L    POCT Ionized Calcium, Arterial 1.12 1.10 - 1.33 mmol/L    POCT Glucose, Arterial 155 (H) 74 - 99 mg/dL    POCT Lactate, Arterial 3.3 (H) 0.4 - 2.0 mmol/L    POCT Base Excess, Arterial -4.7 (L) -2.0 - 3.0 mmol/L    POCT HCO3 Calculated, Arterial 20.4 (L) 22.0 - 26.0 mmol/L    POCT Hemoglobin, Arterial 12.1 12.0 - 16.0 g/dL    POCT Anion Gap, Arterial 12 10 - 25 mmo/L    Patient Temperature 37.0 degrees Celsius    FiO2 60 %   Blood Gas Arterial Full Panel   Result Value Ref Range    POCT pH, Arterial 7.35 (L) 7.38 - 7.42 pH    POCT pCO2, Arterial 40 38 - 42 mm Hg    POCT pO2, Arterial 161 (H) 85 - 95 mm Hg    POCT SO2, Arterial 99 94 - 100 %    POCT Oxy Hemoglobin, Arterial 97.1 94.0 - 98.0 %    POCT Hematocrit Calculated, Arterial 31.0 (L) 36.0 - 46.0 %    POCT Sodium, Arterial 134 (L) 136 - 145 mmol/L    POCT Potassium, Arterial 4.2 3.5 - 5.3 mmol/L    POCT Chloride, Arterial 104 98 - 107 mmol/L    POCT Ionized Calcium, Arterial 1.16 1.10 - 1.33 mmol/L    POCT Glucose, Arterial 148 (H) 74 - 99 mg/dL    POCT Lactate, Arterial 3.9 (H) 0.4 - 2.0 mmol/L    POCT Base Excess,  Arterial -3.3 (L) -2.0 - 3.0 mmol/L    POCT HCO3 Calculated, Arterial 22.1 22.0 - 26.0 mmol/L    POCT Hemoglobin, Arterial 10.4 (L) 12.0 - 16.0 g/dL    POCT Anion Gap, Arterial 12 10 - 25 mmo/L    Patient Temperature 37.0 degrees Celsius    FiO2 50 %   CBC   Result Value Ref Range    WBC 13.4 (H) 4.4 - 11.3 x10*3/uL    nRBC 0.0 0.0 - 0.0 /100 WBCs    RBC 3.18 (L) 4.00 - 5.20 x10*6/uL    Hemoglobin 10.4 (L) 12.0 - 16.0 g/dL    Hematocrit 31.1 (L) 36.0 - 46.0 %    MCV 98 80 - 100 fL    MCH 32.7 26.0 - 34.0 pg    MCHC 33.4 32.0 - 36.0 g/dL    RDW 12.6 11.5 - 14.5 %    Platelets 135 (L) 150 - 450 x10*3/uL   Renal function panel   Result Value Ref Range    Glucose 163 (H) 74 - 99 mg/dL    Sodium 139 136 - 145 mmol/L    Potassium 3.6 3.5 - 5.3 mmol/L    Chloride 104 98 - 107 mmol/L    Bicarbonate 23 21 - 32 mmol/L    Anion Gap 16 10 - 20 mmol/L    Urea Nitrogen 8 6 - 23 mg/dL    Creatinine 0.63 0.50 - 1.05 mg/dL    eGFR >90 >60 mL/min/1.73m*2    Calcium 8.7 8.6 - 10.6 mg/dL    Phosphorus 2.8 2.5 - 4.9 mg/dL    Albumin 4.7 3.4 - 5.0 g/dL   Magnesium   Result Value Ref Range    Magnesium 1.52 (L) 1.60 - 2.40 mg/dL   CBC and Auto Differential   Result Value Ref Range    WBC 10.1 4.4 - 11.3 x10*3/uL    nRBC 0.0 0.0 - 0.0 /100 WBCs    RBC 2.93 (L) 4.00 - 5.20 x10*6/uL    Hemoglobin 9.7 (L) 12.0 - 16.0 g/dL    Hematocrit 30.9 (L) 36.0 - 46.0 %     (H) 80 - 100 fL    MCH 33.1 26.0 - 34.0 pg    MCHC 31.4 (L) 32.0 - 36.0 g/dL    RDW 13.0 11.5 - 14.5 %    Platelets 119 (L) 150 - 450 x10*3/uL    Neutrophils % 83.6 40.0 - 80.0 %    Immature Granulocytes %, Automated 0.4 0.0 - 0.9 %    Lymphocytes % 7.1 13.0 - 44.0 %    Monocytes % 8.8 2.0 - 10.0 %    Eosinophils % 0.0 0.0 - 6.0 %    Basophils % 0.1 0.0 - 2.0 %    Neutrophils Absolute 8.42 (H) 1.20 - 7.70 x10*3/uL    Immature Granulocytes Absolute, Automated 0.04 0.00 - 0.70 x10*3/uL    Lymphocytes Absolute 0.72 (L) 1.20 - 4.80 x10*3/uL    Monocytes Absolute 0.89 0.10 - 1.00  x10*3/uL    Eosinophils Absolute 0.00 0.00 - 0.70 x10*3/uL    Basophils Absolute 0.01 0.00 - 0.10 x10*3/uL   Basic metabolic panel   Result Value Ref Range    Glucose 112 (H) 74 - 99 mg/dL    Sodium 143 136 - 145 mmol/L    Potassium 3.6 3.5 - 5.3 mmol/L    Chloride 108 (H) 98 - 107 mmol/L    Bicarbonate 23 21 - 32 mmol/L    Anion Gap 16 10 - 20 mmol/L    Urea Nitrogen 10 6 - 23 mg/dL    Creatinine 0.70 0.50 - 1.05 mg/dL    eGFR >90 >60 mL/min/1.73m*2    Calcium 9.0 8.6 - 10.6 mg/dL     Imaging  No results found.    Cardiology, Vascular, and Other Imaging  No other imaging results found for the past 7 days      Current Medications  Scheduled medications  Scheduled Medications[1]  Continuous medications  Continuous Medications[2]  PRN medications  PRN Medications[3]     Assessment  Yvrose John is a 60 y.o. female with a past medical history of heart murmur, MVP and of right breast cancer that metastasized to right axillary lymph node. Right lumpectomy and excision of 18 lymph nodes 11/30/22 with Dr. Maldonado. Following surgery, she completed 29 radiation treatments by March 2023 and 20 chemo treatments by October 2023. S/p Right mastectomy with unilateral BULMARO reconstruction with Dr Piedra and Dr Liz on 6/6/2025.     Plan/Recommendations  # S/p Right Mastectomy with BULMARO Reconstruction    - Continue serial flap assessments Q1hour per nursing and Q2hour per plastic surgery team       ·  Assess Doppler pulse at marked site plus flap appearance (color, warmth, turgor)       ·  Nursing to notify plastic surgery team immediately if there are any acute changes          (Including decreased Doppler signal, pallor, temperature change, bleeding, etc.)  - Continue use of Raz hugger to right breasts       ·  Settings: low heat, medium continuous fan        ·  5 hours on, one hour off x 3 days post op   - Monitor surgical sites for S/S of bleeding, infection or dehiscence   - Daily application of Nitrobid paste to R  superomedial breast per plastic surgery team  - Umbilicus dressed with Nitrobid, Aquacel and Tegaderm, keep C/D/I.   - Continue YESSICA drain care per nursing (three YESSICA drains)        ·  Strip drain tubing TID and PRN       ·  Monitor and record output [id number]        ·  Keep drain sites C/D/I with daily drain dressing changes        ·  Call plastics if drain output is >30cc in an hour or greater than 200cc over 8 hours  - Continue incisional wound vac therapy to abdomen x10-14 days post op (~6/20)        ·  Settings: 125 mmHg low continuous suction        ·  Please keep dressing C/D/I, reinforce PRN        ·  Record vac output per nursing q8h       ·  Please alert plastics team with any concerns regarding vac        ·  Plan to transition to Prevena homegoing vac on day of discharge    - Keep right breast flap carefully positioned without pressure         ·  Avoid positioning that would apply pressure to flap region or incisions   - Patient to maintain beach chair position with hips flexed when sitting, laying, standing and ambulating.    - Patient can begin ambulating POD#1 after removal of verma  - Advanced from CLD to FLD on POD#1       ·  IV fluids while NPO and until adequate PO intake        ·  Strict monitoring and recording of I&Os per nursing   - Maintain BP of 110/60 minimum to ensure adequate flap perfusion   - Low transfusion threshold to ensure adequate flap perfusion  - SQ Heparin [id number] resumed postoperatively, transition to Lovenox on POD#3    - Daily ASA to start on POD #1 and continue x30 days post op (end date July 6th 2025)   - Monitor VS/pulse ox [id number]   - Monitor AM CBC/RFP/Mg     # Acute postoperative pain  - Well controlled per patient, continue current regimen        ·  Scheduled Tylenol 975 mg PO [id number]        ·  Scheduled Gabapentin 300 mg [id number]       ·  Scheduled Robaxin 500 mg [id number]       ·  Scheduled Celebrex 100 mg BID       ·  PRN Oxycodone 5mg  [id number] for severe pain  - Bowel  regimen with Colace 100 mg PO BID  - IV Zofran PRN nausea due to narcotics   - Pain assessments [id number]     # Post-operative BP fluctuation  - Overnight on 6/6, patient with three reported episodes of diastolic hypotension to the 40s. IV fluids increased to 125 ml per hour  - On 6/7 AM, BP dom to 129/50 then 175/86. IV fluids were decreased down to 50 ml per hour, and most recent reading at 159/94.   - No HTN at baseline, will continue to monitor and wean fluids upon adequate PO intake    # Anemia   - Historical baseline HGB during prior admission/Incoming HGB on admission 13.0  - HGB on AM at 9.7  - Presently no S/S of bleeding, considered likely due to intraoperative blood loss   - Keep T&S UTD, last obtained 5/23  - Monitor for S/S of bleeding or symptomatic anemia   - Low transfusion threshold to ensure adequate flap perfusion  - Transfuse for HGB <7 per protocol   - Monitor AM CBC     # Thrombocytopenia  - Historical baseline platelets in 180s  - Platelets at 135 on 6/6 PM, down to 119 on 6/7 AM, will continue to monitor daily    # Hx of Breast Cancer  - Currently stable, continue to hold home Arimidex due to wound healing risks     Prophylaxis:   - DVT: SQ heparin [id number] (plan to transition to SQ Lovenox on POD#3), ASA 81 to start/resume POD#1 then continue once daily, SCDs  - Encourage IS x10 every hour while awake   - PPI: Protonix 40 mg daily  - Bowel regimen: Colace 100mg BID        Disposition: Continue care on RNF. Will remain inpatient for continued flap monitoring and vac/drain surveillance postoperatively. Follow up appointment with plastic surgery department to be scheduled closer to anticipated date of discharge.     Patient evaluated and plan discussed with KUSH De La CruzC    Plastic and Reconstructive Surgery   [place]  Pager #[phone]  Team phone: [phone]           [1] acetaminophen, 975 mg, oral, q8h HAYLEY  [Held by provider] anastrozole, 1 mg, oral, Daily  gabapentin, 300 mg,  oral, q8h HAYLEY  heparin (porcine), 5,000 Units, subcutaneous, q8h  ketorolac, 15 mg, intravenous, q6h HAYLEY  methocarbamol, 500 mg, oral, q6h HAYLEY  nitroglycerin, 0.5 inch, transdermal, Daily     [2] lactated Ringer's, 125 mL/hr, Last Rate: 125 mL/hr (06/07/25 0324)  ropivacaine (PF) in NS cmpd, 10 mL/hr, Last Rate: 7 mL/hr (06/06/25 1929)     [3] PRN medications: ketorolac

## 2025-06-07 NOTE — SIGNIFICANT EVENT
Department of Plastic and Reconstructive Surgery  Significant Event     Patient Name: Yvrose John  MRN: [id number]  Date:  06/07/25         Subjective   Patient is resting comfortably in chair. She reports 0/10 pain at this moment and notes only increased pain with ambulating/movement. She endorses improvement in numbness in her left thumb and pointer finger compared to yesterday evening. She is tolerating a regular diet without nausea (Christiano at bedside), voiding spontaneously, +gas. Vioptix averaging 71% with strong doppler sounds YESSICA drains are to suction with LLQ with more sanguinous output compared to 2 other YESSICA drains. Umbilicus dressing intact with dark blood drainage within dressing borders consistent with older blood. Denies any fever, chills, night sweats, CP, SOB, palpitations, nausea or vomiting.     Constitutional: A&Ox3, calm and cooperative, NAD.  Eyes: EOMI, clear sclera.  Head/Neck: Neck supple, trachea midline.  Cardiovascular: Regular rate.  Respiratory/Thorax: Good symmetric chest expansion, breathing comfortably on NC   Gastrointestinal: Abdomen soft, ND/NT.   Neurological: A&Ox3, cranial nerves II-XII grossly intact  Psychological: Appropriate mood and behavior.  MSK: ROM intact, ATKINS actively. LUE with nonpitting edema noted to L hand and forearm, radial and ulnar pulses palpable.  Skin: Warm and dry, no rashes or lesions. See dedicated flap/surgical site exam.   Flap/surgical sites: BULMARO flap recipient site to Right breast which is appropriate color, warm to touch, soft. Flap approximated with intact sutures without evidence of bleeding or dehiscence. Intact Doppler pulse at marked site. Flap edges which are intact. Nitrobid applied to superior portion of the right breast above the incision line/radiated skin. Flap donor site of lower abdomen which is dressed with  incisional wound vac connected to hospital vac device. Wound vac dressing intact and device maintaining continuous suction at  125 mmHg without alarm, leak, obstruction or malfunction. No output appreciated in collecting canister. 3 YESSICA drains (1 to RLQ, 1 to LLQ, and 1 to right breast) with moderate serosanguinous output in bulbs. Umbilicus dressed with Nitrobid, Aquacel and Tegaderm.  There is dark brown/old blood drainage within the contents of the Tegaderm that is new compared to yesterday's exam. Of note, Left posterior upper arm has minimal bruising from heparin subcutaneous injections that is not consistent with HIT related necrosis.     Plan/Recommendations  # S/p Right Mastectomy with BULMARO Reconstruction    -Continued to have increased BP during day shift  - Most recent BP reading of 165/75 mm HG at 8 pm tonight.   -Limited BP access to lower extremities due to concerns of exacerbation of lymphedema in the right arm and increased swelling from previous infiltrated IV of left arm.   - Continue with VS Q4 per nursing   - recommend patient laying in bed for all BP measurements for more accurate reading  - Consult General Medicine placed  for recommendations of BP control with Calcium Channel Blockers   - Medicine is recommending hydralazine 25 [id number] PRN for systolic BP over 180.   - Continue with doppler checks per plastics and nursing   - For all other care and details please refer to daily progress note.     Patient evaluated and Dr Liz made aware.      Bronwyn Dao PA-C   Plastic and Reconstructive Surgery   [place]  Pager #[phone]  Team phone: [phone]

## 2025-06-07 NOTE — HOSPITAL COURSE
BRIEF HISTORY:    Yvrose John is a 60 y.o. female with a past medical history of heart murmur, MVP and right breast cancer that metastasized to right axillary lymph node. Right lumpectomy and excision of 18 lymph nodes 11/30/22 with Dr. Maldonado. Following surgery, she completed 29 radiation treatments by March 2023 and 20 chemo treatments by October 2023. Now s/p Right mastectomy with unilateral BULMARO reconstruction with Dr Piedra and Dr Liz on 6/6/2025.    HOSPITAL COURSE:    Patient underwent R mastectomy and unilateral BULMARO reconstruction with Dr. Rowell and Dr. Piedra on 6/6. She remained NPO and on bedrest overnight. Overnight, patient with three reported episodes of diastolic hypotension to the 40s. IV fluids increased from 100 to 125 ml per hour. On POD1, BP dom to 175/86, IV fluids were decreased to 50 ml per hour and then discontinued. BP continued to range from 150s-160s/70s overnight on 6/7. Otherwise Doppler sounds were strong, Vioptix was averaging 75% on 98 signal strength, patient's pain was well-controlled, she began advancing her diet, verma was removed, and patient was ambulating halls. She had yet to have BM on POD1. She endorsed some L hand and arm swelling/numbness that began immediately post-operatively. On POD2, patient tolerated a regular diet, continued ambulating well, had BM, reported improvement to her L hand/arm numbness and swelling, and BP stabilized in 150s-160s/70s range.       CONSULTATIONS:  - Anesthesia team was consulted for acute pain blocks which were in place for two days and removed 6/8.   - General medicine team consulted on 6/7 PM for HTN episodes, given patient asymptomatic and no previous history of HTN, advised prn PO hydralazine 25 TID for SBP>180, otherwise no initiation of other chronic HTN meds required.     DAY OF DISCHARGE:    On the day of discharge, the patient was seen and evaluated by the Plastic Surgery team and deemed suitable for discharge to home.  There were no significant events overnight. Vitals were reviewed and within normal limits. Labs were stable at discharge. On day of discharge the patient was tolerating a diet, pain was controlled on PO pain medication, was ambulating well and voiding spontaneously. The patient was given detailed discharge instructions and were scheduled to follow up as an outpatient.

## 2025-06-07 NOTE — CARE PLAN
The clinical goals for the shift include remain HDS and have managed pain      Problem: Pain - Adult  Goal: Verbalizes/displays adequate comfort level or baseline comfort level  Outcome: Progressing     Problem: Safety - Adult  Goal: Free from fall injury  Outcome: Progressing     Problem: Discharge Planning  Goal: Discharge to home or other facility with appropriate resources  Outcome: Progressing     Problem: Chronic Conditions and Co-morbidities  Goal: Patient's chronic conditions and co-morbidity symptoms are monitored and maintained or improved  Outcome: Progressing     Problem: Nutrition  Goal: Nutrient intake appropriate for maintaining nutritional needs  Outcome: Progressing     Problem: Skin  Goal: Decreased wound size/increased tissue granulation at next dressing change  Outcome: Progressing  Goal: Participates in plan/prevention/treatment measures  Outcome: Progressing  Goal: Prevent/manage excess moisture  Outcome: Progressing  Goal: Prevent/minimize sheer/friction injuries  Outcome: Progressing  Goal: Promote/optimize nutrition  Outcome: Progressing  Goal: Promote skin healing  Outcome: Progressing

## 2025-06-08 VITALS
BODY MASS INDEX: 37.97 KG/M2 | HEIGHT: 63 IN | OXYGEN SATURATION: 94 % | WEIGHT: 214.29 LBS | TEMPERATURE: 98.1 F | RESPIRATION RATE: 18 BRPM | DIASTOLIC BLOOD PRESSURE: 66 MMHG | SYSTOLIC BLOOD PRESSURE: 163 MMHG | HEART RATE: 90 BPM

## 2025-06-08 LAB
ABO GROUP (TYPE) IN BLOOD: NORMAL
ANION GAP SERPL CALC-SCNC: 11 MMOL/L (ref 10–20)
ANTIBODY SCREEN: NORMAL
BASOPHILS # BLD AUTO: 0.02 X10*3/UL (ref 0–0.1)
BASOPHILS NFR BLD AUTO: 0.3 %
BUN SERPL-MCNC: 12 MG/DL (ref 6–23)
CALCIUM SERPL-MCNC: 8.8 MG/DL (ref 8.6–10.6)
CHLORIDE SERPL-SCNC: 110 MMOL/L (ref 98–107)
CO2 SERPL-SCNC: 28 MMOL/L (ref 21–32)
CREAT SERPL-MCNC: 0.71 MG/DL (ref 0.5–1.05)
EGFRCR SERPLBLD CKD-EPI 2021: >90 ML/MIN/1.73M*2
EOSINOPHIL # BLD AUTO: 0.03 X10*3/UL (ref 0–0.7)
EOSINOPHIL NFR BLD AUTO: 0.4 %
ERYTHROCYTE [DISTWIDTH] IN BLOOD BY AUTOMATED COUNT: 13.2 % (ref 11.5–14.5)
GLUCOSE SERPL-MCNC: 97 MG/DL (ref 74–99)
HCT VFR BLD AUTO: 27.8 % (ref 36–46)
HGB BLD-MCNC: 9.1 G/DL (ref 12–16)
IMM GRANULOCYTES # BLD AUTO: 0.03 X10*3/UL (ref 0–0.7)
IMM GRANULOCYTES NFR BLD AUTO: 0.4 % (ref 0–0.9)
LYMPHOCYTES # BLD AUTO: 1 X10*3/UL (ref 1.2–4.8)
LYMPHOCYTES NFR BLD AUTO: 14.6 %
MAGNESIUM SERPL-MCNC: 2.18 MG/DL (ref 1.6–2.4)
MCH RBC QN AUTO: 32.5 PG (ref 26–34)
MCHC RBC AUTO-ENTMCNC: 32.7 G/DL (ref 32–36)
MCV RBC AUTO: 99 FL (ref 80–100)
MONOCYTES # BLD AUTO: 0.69 X10*3/UL (ref 0.1–1)
MONOCYTES NFR BLD AUTO: 10.1 %
NEUTROPHILS # BLD AUTO: 5.09 X10*3/UL (ref 1.2–7.7)
NEUTROPHILS NFR BLD AUTO: 74.2 %
NRBC BLD-RTO: 0 /100 WBCS (ref 0–0)
PLATELET # BLD AUTO: 121 X10*3/UL (ref 150–450)
POTASSIUM SERPL-SCNC: 3.5 MMOL/L (ref 3.5–5.3)
RBC # BLD AUTO: 2.8 X10*6/UL (ref 4–5.2)
RH FACTOR (ANTIGEN D): NORMAL
SODIUM SERPL-SCNC: 145 MMOL/L (ref 136–145)
WBC # BLD AUTO: 6.9 X10*3/UL (ref 4.4–11.3)

## 2025-06-08 PROCEDURE — 36415 COLL VENOUS BLD VENIPUNCTURE: CPT

## 2025-06-08 PROCEDURE — 86923 COMPATIBILITY TEST ELECTRIC: CPT

## 2025-06-08 PROCEDURE — 2500000001 HC RX 250 WO HCPCS SELF ADMINISTERED DRUGS (ALT 637 FOR MEDICARE OP): Performed by: PHYSICIAN ASSISTANT

## 2025-06-08 PROCEDURE — 83735 ASSAY OF MAGNESIUM: CPT | Performed by: PHYSICIAN ASSISTANT

## 2025-06-08 PROCEDURE — 86901 BLOOD TYPING SEROLOGIC RH(D): CPT

## 2025-06-08 PROCEDURE — 1170000001 HC PRIVATE ONCOLOGY ROOM DAILY

## 2025-06-08 PROCEDURE — 99231 SBSQ HOSP IP/OBS SF/LOW 25: CPT | Performed by: STUDENT IN AN ORGANIZED HEALTH CARE EDUCATION/TRAINING PROGRAM

## 2025-06-08 PROCEDURE — 2500000004 HC RX 250 GENERAL PHARMACY W/ HCPCS (ALT 636 FOR OP/ED): Performed by: PHYSICIAN ASSISTANT

## 2025-06-08 PROCEDURE — 80048 BASIC METABOLIC PNL TOTAL CA: CPT

## 2025-06-08 PROCEDURE — 2500000005 HC RX 250 GENERAL PHARMACY W/O HCPCS

## 2025-06-08 PROCEDURE — 99232 SBSQ HOSP IP/OBS MODERATE 35: CPT

## 2025-06-08 PROCEDURE — 85025 COMPLETE CBC W/AUTO DIFF WBC: CPT | Performed by: PHYSICIAN ASSISTANT

## 2025-06-08 PROCEDURE — 2500000001 HC RX 250 WO HCPCS SELF ADMINISTERED DRUGS (ALT 637 FOR MEDICARE OP)

## 2025-06-08 PROCEDURE — 86850 RBC ANTIBODY SCREEN: CPT

## 2025-06-08 RX ORDER — ADHESIVE BANDAGE
30 BANDAGE TOPICAL DAILY PRN
Status: DISPENSED | OUTPATIENT
Start: 2025-06-08

## 2025-06-08 RX ORDER — METHOCARBAMOL 500 MG/1
500 TABLET, FILM COATED ORAL EVERY 6 HOURS SCHEDULED
Qty: 120 TABLET | Refills: 0 | OUTPATIENT
Start: 2025-06-08 | End: 2025-07-08

## 2025-06-08 RX ORDER — DOCUSATE SODIUM 100 MG/1
100 CAPSULE, LIQUID FILLED ORAL 2 TIMES DAILY
Qty: 60 CAPSULE | Refills: 0 | OUTPATIENT
Start: 2025-06-08 | End: 2025-07-08

## 2025-06-08 RX ORDER — PANTOPRAZOLE SODIUM 40 MG/1
40 TABLET, DELAYED RELEASE ORAL
Qty: 30 TABLET | Refills: 0 | OUTPATIENT
Start: 2025-06-09 | End: 2025-07-09

## 2025-06-08 RX ORDER — ONDANSETRON HYDROCHLORIDE 2 MG/ML
4 INJECTION, SOLUTION INTRAVENOUS EVERY 8 HOURS PRN
Status: ACTIVE | OUTPATIENT
Start: 2025-06-08

## 2025-06-08 RX ORDER — TRAMADOL HYDROCHLORIDE 50 MG/1
50 TABLET, FILM COATED ORAL EVERY 6 HOURS PRN
Status: DISPENSED | OUTPATIENT
Start: 2025-06-08

## 2025-06-08 RX ORDER — ACETAMINOPHEN 325 MG/1
975 TABLET ORAL EVERY 8 HOURS SCHEDULED
Qty: 270 TABLET | Refills: 0 | OUTPATIENT
Start: 2025-06-09 | End: 2025-07-09

## 2025-06-08 RX ORDER — NAPROXEN SODIUM 220 MG/1
81 TABLET, FILM COATED ORAL DAILY
Qty: 30 TABLET | Refills: 0 | OUTPATIENT
Start: 2025-06-09 | End: 2025-07-09

## 2025-06-08 RX ORDER — ONDANSETRON 4 MG/1
4 TABLET, FILM COATED ORAL EVERY 8 HOURS PRN
Qty: 20 TABLET | Refills: 0 | OUTPATIENT
Start: 2025-06-08

## 2025-06-08 RX ORDER — CELECOXIB 100 MG/1
100 CAPSULE ORAL 2 TIMES DAILY
Qty: 60 CAPSULE | Refills: 0 | OUTPATIENT
Start: 2025-06-09 | End: 2025-07-09

## 2025-06-08 RX ORDER — NITROGLYCERIN 20 MG/G
0.5 OINTMENT TOPICAL DAILY
Qty: 1 G | Refills: 0 | OUTPATIENT
Start: 2025-06-09 | End: 2025-07-09

## 2025-06-08 RX ORDER — ONDANSETRON 4 MG/1
4 TABLET, FILM COATED ORAL EVERY 8 HOURS PRN
Status: ACTIVE | OUTPATIENT
Start: 2025-06-08

## 2025-06-08 RX ORDER — GABAPENTIN 300 MG/1
300 CAPSULE ORAL EVERY 8 HOURS SCHEDULED
Qty: 90 CAPSULE | Refills: 11 | OUTPATIENT
Start: 2025-06-08

## 2025-06-08 RX ADMIN — METHOCARBAMOL 500 MG: 500 TABLET ORAL at 18:44

## 2025-06-08 RX ADMIN — GABAPENTIN 300 MG: 300 CAPSULE ORAL at 15:08

## 2025-06-08 RX ADMIN — METHOCARBAMOL 500 MG: 500 TABLET ORAL at 06:17

## 2025-06-08 RX ADMIN — DOCUSATE SODIUM 100 MG: 100 CAPSULE, LIQUID FILLED ORAL at 22:43

## 2025-06-08 RX ADMIN — DOCUSATE SODIUM 100 MG: 100 CAPSULE, LIQUID FILLED ORAL at 08:43

## 2025-06-08 RX ADMIN — ACETAMINOPHEN 975 MG: 325 TABLET ORAL at 00:45

## 2025-06-08 RX ADMIN — PANTOPRAZOLE SODIUM 40 MG: 40 TABLET, DELAYED RELEASE ORAL at 06:17

## 2025-06-08 RX ADMIN — GABAPENTIN 300 MG: 300 CAPSULE ORAL at 06:17

## 2025-06-08 RX ADMIN — GABAPENTIN 300 MG: 300 CAPSULE ORAL at 22:41

## 2025-06-08 RX ADMIN — OXYCODONE HYDROCHLORIDE 5 MG: 5 TABLET ORAL at 03:03

## 2025-06-08 RX ADMIN — HEPARIN SODIUM 5000 UNITS: 5000 INJECTION, SOLUTION INTRAVENOUS; SUBCUTANEOUS at 08:43

## 2025-06-08 RX ADMIN — METHOCARBAMOL 500 MG: 500 TABLET ORAL at 11:50

## 2025-06-08 RX ADMIN — TRAMADOL HYDROCHLORIDE 50 MG: 50 TABLET, COATED ORAL at 15:14

## 2025-06-08 RX ADMIN — NITROGLYCERIN 0.5 INCH: 20 OINTMENT TOPICAL at 11:50

## 2025-06-08 RX ADMIN — ASPIRIN 81 MG: 81 TABLET, CHEWABLE ORAL at 08:43

## 2025-06-08 RX ADMIN — CELECOXIB 100 MG: 100 CAPSULE ORAL at 17:01

## 2025-06-08 RX ADMIN — HEPARIN SODIUM 5000 UNITS: 5000 INJECTION, SOLUTION INTRAVENOUS; SUBCUTANEOUS at 17:01

## 2025-06-08 RX ADMIN — MAGNESIUM HYDROXIDE 30 ML: 400 SUSPENSION ORAL at 15:08

## 2025-06-08 RX ADMIN — TRAMADOL HYDROCHLORIDE 50 MG: 50 TABLET, COATED ORAL at 08:43

## 2025-06-08 RX ADMIN — ACETAMINOPHEN 975 MG: 325 TABLET ORAL at 17:01

## 2025-06-08 RX ADMIN — CELECOXIB 100 MG: 100 CAPSULE ORAL at 06:17

## 2025-06-08 RX ADMIN — ACETAMINOPHEN 975 MG: 325 TABLET ORAL at 08:43

## 2025-06-08 SDOH — ECONOMIC STABILITY: FOOD INSECURITY: WITHIN THE PAST 12 MONTHS, YOU WORRIED THAT YOUR FOOD WOULD RUN OUT BEFORE YOU GOT THE MONEY TO BUY MORE.: NEVER TRUE

## 2025-06-08 SDOH — ECONOMIC STABILITY: FOOD INSECURITY: WITHIN THE PAST 12 MONTHS, THE FOOD YOU BOUGHT JUST DIDN'T LAST AND YOU DIDN'T HAVE MONEY TO GET MORE.: NEVER TRUE

## 2025-06-08 SDOH — ECONOMIC STABILITY: HOUSING INSECURITY: IN THE LAST 12 MONTHS, WAS THERE A TIME WHEN YOU WERE NOT ABLE TO PAY THE MORTGAGE OR RENT ON TIME?: NO

## 2025-06-08 SDOH — ECONOMIC STABILITY: FOOD INSECURITY: HOW HARD IS IT FOR YOU TO PAY FOR THE VERY BASICS LIKE FOOD, HOUSING, MEDICAL CARE, AND HEATING?: NOT VERY HARD

## 2025-06-08 SDOH — ECONOMIC STABILITY: INCOME INSECURITY: IN THE PAST 12 MONTHS HAS THE ELECTRIC, GAS, OIL, OR WATER COMPANY THREATENED TO SHUT OFF SERVICES IN YOUR HOME?: NO

## 2025-06-08 SDOH — ECONOMIC STABILITY: HOUSING INSECURITY: IN THE PAST 12 MONTHS, HOW MANY TIMES HAVE YOU MOVED WHERE YOU WERE LIVING?: 0

## 2025-06-08 SDOH — SOCIAL STABILITY: SOCIAL INSECURITY: WITHIN THE LAST YEAR, HAVE YOU BEEN AFRAID OF YOUR PARTNER OR EX-PARTNER?: NO

## 2025-06-08 SDOH — ECONOMIC STABILITY: HOUSING INSECURITY: AT ANY TIME IN THE PAST 12 MONTHS, WERE YOU HOMELESS OR LIVING IN A SHELTER (INCLUDING NOW)?: NO

## 2025-06-08 SDOH — ECONOMIC STABILITY: TRANSPORTATION INSECURITY: IN THE PAST 12 MONTHS, HAS LACK OF TRANSPORTATION KEPT YOU FROM MEDICAL APPOINTMENTS OR FROM GETTING MEDICATIONS?: NO

## 2025-06-08 SDOH — SOCIAL STABILITY: SOCIAL INSECURITY: WITHIN THE LAST YEAR, HAVE YOU BEEN HUMILIATED OR EMOTIONALLY ABUSED IN OTHER WAYS BY YOUR PARTNER OR EX-PARTNER?: NO

## 2025-06-08 ASSESSMENT — PAIN SCALES - GENERAL
PAINLEVEL_OUTOF10: 7
PAINLEVEL_OUTOF10: 7
PAINLEVEL_OUTOF10: 0 - NO PAIN

## 2025-06-08 ASSESSMENT — PAIN - FUNCTIONAL ASSESSMENT
PAIN_FUNCTIONAL_ASSESSMENT: 0-10

## 2025-06-08 ASSESSMENT — COGNITIVE AND FUNCTIONAL STATUS - GENERAL
STANDING UP FROM CHAIR USING ARMS: A LITTLE
MOVING TO AND FROM BED TO CHAIR: A LITTLE
TURNING FROM BACK TO SIDE WHILE IN FLAT BAD: A LITTLE
DRESSING REGULAR LOWER BODY CLOTHING: A LITTLE
MOBILITY SCORE: 18
MOBILITY SCORE: 18
CLIMB 3 TO 5 STEPS WITH RAILING: A LOT
CLIMB 3 TO 5 STEPS WITH RAILING: A LOT
DAILY ACTIVITIY SCORE: 20
DRESSING REGULAR LOWER BODY CLOTHING: A LITTLE
WALKING IN HOSPITAL ROOM: A LITTLE
DRESSING REGULAR UPPER BODY CLOTHING: A LITTLE
HELP NEEDED FOR BATHING: A LITTLE
TURNING FROM BACK TO SIDE WHILE IN FLAT BAD: A LITTLE
WALKING IN HOSPITAL ROOM: A LITTLE
DAILY ACTIVITIY SCORE: 20
TOILETING: A LITTLE
MOVING TO AND FROM BED TO CHAIR: A LITTLE
TOILETING: A LITTLE
STANDING UP FROM CHAIR USING ARMS: A LITTLE
DRESSING REGULAR UPPER BODY CLOTHING: A LITTLE
HELP NEEDED FOR BATHING: A LITTLE

## 2025-06-08 ASSESSMENT — ACTIVITIES OF DAILY LIVING (ADL)
LACK_OF_TRANSPORTATION: NO
LACK_OF_TRANSPORTATION: NO

## 2025-06-08 NOTE — PROGRESS NOTES
"  Department of Plastic and Reconstructive Surgery  Progress Note    Patient Name: Yvrose John  MRN: [id number]  Date:  06/08/25     Subjective  Patient resting comfortably in bed on exam. She reports oxycodone alleviated her pain last night but made her feel \"foggy,\" plan to switch to Tramadol PRN for severe pain today. Currently her pain is well-controlled. She reports improvement in L thumb and pointer finger numbness, as well as decreased swelling to LUE. Reports passing gas, still no BM yet. Doppler sounds are strong. Vioptix is averaging 69% with 96 signal strength. Three YESSICA drains in place, and wound vac is to suction without leak. Denies any fever, chills, night sweats, CP, SOB, palpitations, nausea or vomiting.     Overnight Events  BP averaging in 150-160s/70s, general medicine team consulted, advised prn PO hydralazine 25 TID for SBP>180, otherwise no initiation of other chronic HTN meds required.     Objective    Vital Signs  /62 (BP Location: Left leg, Patient Position: Lying)   Pulse 91   Temp 36.6 °C (97.8 °F) (Temporal)   Resp 18   Ht 1.6 m (5' 2.99\")   Wt 97.2 kg (214 lb 4.6 oz)   SpO2 93%   BMI 37.97 kg/m²      Constitutional: A&Ox3, calm and cooperative, NAD.  Eyes: EOMI, clear sclera.  Head/Neck: Neck supple, trachea midline.  Cardiovascular: Regular rate.  Respiratory/Thorax: Good symmetric chest expansion, breathing comfortably on RA  Gastrointestinal: Abdomen soft, ND/NT.   Neurological: A&Ox3, cranial nerves II-XII grossly intact  Psychological: Appropriate mood and behavior.  MSK: ROM intact, ATKINS actively. LUE with nonpitting edema noted to L hand and forearm, radial and ulnar pulses palpable.  Skin: Warm and dry, no rashes or lesions. Left posterior upper arm with mild ecchymosis from subcutaneous heparin injections, no skin necrosis. Also L wrist with mild ecchymosis from previous arterial line, no skin necrosis. See additional dedicated flap/surgical site exam. "   Flap/surgical sites: BULMARO flap recipient site to Right breast which is appropriate color, warm to touch, soft. Flap approximated with intact sutures without evidence of bleeding or dehiscence. Intact Doppler pulse at marked site. Flap edges which are intact. Nitrobid applied to superior portion of the right breast above the incision line/radiated skin. Flap donor site of lower abdomen which is dressed with incisional wound vac connected to hospital vac device. Wound vac dressing intact and device maintaining continuous suction at 125 mmHg without alarm, leak, obstruction or malfunction. No output appreciated in collecting canister. 3 YESSICA drains (1 to RLQ, 1 to LLQ, and 1 to right breast) with moderate serosanguinous output in bulbs. Umbilicus dressed with Nitrobid, Aquacel and Tegaderm. Dark brown/old sanguineous drainage within the contents of the Tegaderm that is new compared to yesterday's exam.     Diagnostics   Results for orders placed or performed during the hospital encounter of 06/06/25 (from the past 24 hours)   Basic Metabolic Panel   Result Value Ref Range    Glucose 97 74 - 99 mg/dL    Sodium 145 136 - 145 mmol/L    Potassium 3.5 3.5 - 5.3 mmol/L    Chloride 110 (H) 98 - 107 mmol/L    Bicarbonate 28 21 - 32 mmol/L    Anion Gap 11 10 - 20 mmol/L    Urea Nitrogen 12 6 - 23 mg/dL    Creatinine 0.71 0.50 - 1.05 mg/dL    eGFR >90 >60 mL/min/1.73m*2    Calcium 8.8 8.6 - 10.6 mg/dL   CBC and Auto Differential   Result Value Ref Range    WBC 6.9 4.4 - 11.3 x10*3/uL    nRBC 0.0 0.0 - 0.0 /100 WBCs    RBC 2.80 (L) 4.00 - 5.20 x10*6/uL    Hemoglobin 9.1 (L) 12.0 - 16.0 g/dL    Hematocrit 27.8 (L) 36.0 - 46.0 %    MCV 99 80 - 100 fL    MCH 32.5 26.0 - 34.0 pg    MCHC 32.7 32.0 - 36.0 g/dL    RDW 13.2 11.5 - 14.5 %    Platelets 121 (L) 150 - 450 x10*3/uL    Neutrophils % 74.2 40.0 - 80.0 %    Immature Granulocytes %, Automated 0.4 0.0 - 0.9 %    Lymphocytes % 14.6 13.0 - 44.0 %    Monocytes % 10.1 2.0 - 10.0 %     Eosinophils % 0.4 0.0 - 6.0 %    Basophils % 0.3 0.0 - 2.0 %    Neutrophils Absolute 5.09 1.20 - 7.70 x10*3/uL    Immature Granulocytes Absolute, Automated 0.03 0.00 - 0.70 x10*3/uL    Lymphocytes Absolute 1.00 (L) 1.20 - 4.80 x10*3/uL    Monocytes Absolute 0.69 0.10 - 1.00 x10*3/uL    Eosinophils Absolute 0.03 0.00 - 0.70 x10*3/uL    Basophils Absolute 0.02 0.00 - 0.10 x10*3/uL   Magnesium   Result Value Ref Range    Magnesium 2.18 1.60 - 2.40 mg/dL     Imaging  No results found.    Cardiology, Vascular, and Other Imaging  No other imaging results found for the past 7 days      Current Medications  Scheduled medications  Scheduled Medications[1]  Continuous medications  Continuous Medications[2]  PRN medications  PRN Medications[3]     Assessment  Yvrose John is a 60 y.o. female with a past medical history of heart murmur, MVP and of right breast cancer that metastasized to right axillary lymph node. Right lumpectomy and excision of 18 lymph nodes 11/30/22 with Dr. Maldonado. Following surgery, she completed 29 radiation treatments by March 2023 and 20 chemo treatments by October 2023. S/p Right mastectomy with unilateral BULMARO reconstruction with Dr Pidera and Dr Liz on 6/6/2025.     Plan/Recommendations  # S/p Right Mastectomy with BULMARO Reconstruction    - Continue serial flap assessments Q1hour per nursing and interval checks per plastic surgery team       ·  Assess Doppler pulse at marked site plus flap appearance (color, warmth, turgor)       ·  Nursing to notify plastic surgery team immediately if there are any acute changes          (Including decreased Doppler signal, pallor, temperature change, bleeding, etc.)  - Continue use of Raz hugger to right breasts       ·  Settings: low heat, medium continuous fan        ·  5 hours on, one hour off x 3 days post op   - Monitor surgical sites for S/S of bleeding, infection or dehiscence   - Daily application of Nitrobid paste to R superomedial breast per  plastic surgery team  - Umbilicus dressed with Nitrobid, Aquacel and Tegaderm, will replace on day of discharge.   - Continue YESSICA drain care per nursing (three YESSICA drains)        ·  Strip drain tubing TID and PRN       ·  Monitor and record output [id number]        ·  Keep drain sites C/D/I with daily drain dressing changes        ·  Call plastics if drain output is >30cc in an hour or greater than 200cc over 8 hours  - Continue incisional wound vac therapy to abdomen x10-14 days post op (~6/20)        ·  Settings: 125 mmHg low continuous suction        ·  Please keep dressing C/D/I, reinforce PRN        ·  Record vac output per nursing q8h       ·  Please alert plastics team with any concerns regarding vac        ·  Plan to transition to Prevena homegoing vac on day of discharge    - Keep right breast flap carefully positioned without pressure         ·  Avoid positioning that would apply pressure to flap region or incisions   - Patient to maintain beach chair position with hips flexed when sitting, laying, standing and ambulating.    - OK for patient to be OOB with hips flexed at all times  - OK for regular diet   - Maintain BP of 110/60 minimum to ensure adequate flap perfusion   - Low transfusion threshold to ensure adequate flap perfusion  - SQ Heparin [id number] resumed postoperatively, transition to Lovenox on POD#3    - Daily ASA to start on POD #1 and continue x30 days post op (end date July 6th 2025)   - Monitor VS/pulse ox [id number]   - Monitor AM CBC/RFP/Mg     # Acute postoperative pain  - Well controlled per patient, will transition to Oxycodone PRN to Tramadol PRN. R paravertebral block removed per acute pain service on 6/8.       ·  Scheduled Tylenol 975 mg PO [id number]        ·  Scheduled Gabapentin 300 mg W4lhqgm       ·  Scheduled Robaxin 500 mg [id number]       ·  Scheduled Celebrex 100 mg BID       ·  PRN Tramadol 50 mg T4xfopm for severe pain  - Bowel regimen with Colace 100 mg PO BID  - IV Zofran PRN nausea  due to narcotics   - Pain assessments [id number]     # Post-operative BP fluctuation  - Overnight on 6/6, patient with three reported episodes of diastolic hypotension to the 40s. IV fluids increased from 100 to 125 ml per hour.  - On 6/7 AM, BP dom to 129/50 then 175/86. IV fluids were decreased down to 50 ml per hour, and most recent reading at 159/94. Fluids were discontinued. BP continued to range from 150s-160s/70s overnight on 6/7 and into 6/8.   - Gen Medicine team consulted on 6/7 PM, given patient asymptomatic and no previous history of HTN, advised prn PO hydralazine 25 TID for SBP>180, otherwise no initiation of other chronic HTN meds required.     # Anemia   - Historical baseline HGB during prior admission/Incoming HGB on admission 13.0  - HGB on AM at 9.1  - Presently no S/S of bleeding, considered likely due to intraoperative blood loss   - Keep T&S UTD, last obtained 6/8  - Monitor for S/S of bleeding or symptomatic anemia   - Low transfusion threshold to ensure adequate flap perfusion  - Transfuse for HGB <9 per protocol   - Monitor AM CBC     # Thrombocytopenia  - Historical baseline platelets in 180s  - Platelets at 135 on 6/6 PM, down to 119 on 6/7, increased to 121 6/8, will continue to monitor daily    # Hx of Breast Cancer  - Currently stable, continue to hold home Arimidex due to wound healing risks     Prophylaxis:   - DVT: SQ heparin [id number] (plan to transition to SQ Lovenox on POD#3), ASA 81 to start/resume POD#1 then continue once daily, SCDs  - Encourage IS x10 every hour while awake   - PPI: Protonix 40 mg daily  - Bowel regimen: Colace 100mg BID        Disposition: Continue care on RNF. Will remain inpatient for continued flap monitoring and vac/drain surveillance postoperatively. Follow up appointment with plastic surgery department scheduled for 6/12.    Patient evaluated and plan discussed with KUSH De La CruzC    Plastic and Reconstructive Surgery   [facility]  Pager  #[phone]  Team phone: [phone]           [1] acetaminophen, 975 mg, oral, q8h HAYLEY  [Held by provider] anastrozole, 1 mg, oral, Daily  aspirin, 81 mg, oral, Daily  celecoxib, 100 mg, oral, BID  docusate sodium, 100 mg, oral, BID  gabapentin, 300 mg, oral, q8h HAYLEY  heparin (porcine), 5,000 Units, subcutaneous, q8h  methocarbamol, 500 mg, oral, q6h HAYLEY  nitroglycerin, 0.5 inch, transdermal, Daily  pantoprazole, 40 mg, oral, Daily before breakfast     [2] ropivacaine (PF) in NS cmpd, 10 mL/hr, Last Rate: 7 mL/hr (06/07/25 1334)     [3] PRN medications: hydrALAZINE, traMADol

## 2025-06-08 NOTE — PROGRESS NOTES
06/08/25 1624   Discharge Planning   Living Arrangements Spouse/significant other   Support Systems Spouse/significant other   Assistance Needed none   Type of Residence Private residence   Number of Stairs to Enter Residence 3   Number of Stairs Within Residence 0   Do you have animals or pets at home? Yes   Type of Animals or Pets dog/cat   Who is requesting discharge planning? Other (Comment)  (TCC assessment)   Expected Discharge Disposition Home   Does the patient need discharge transport arranged? No   Financial Resource Strain   How hard is it for you to pay for the very basics like food, housing, medical care, and heating? Not very   Housing Stability   In the last 12 months, was there a time when you were not able to pay the mortgage or rent on time? N   In the past 12 months, how many times have you moved where you were living? 0   At any time in the past 12 months, were you homeless or living in a shelter (including now)? N   Transportation Needs   In the past 12 months, has lack of transportation kept you from medical appointments or from getting medications? no   In the past 12 months, has lack of transportation kept you from meetings, work, or from getting things needed for daily living? No   Intensity of Service   Intensity of Service 0-30 min     Transitional Care Coordinator Note: Met with patient to discuss discharge planning s/p admission.  Patient lives home with spouse( Tucker) as listed.  Independent in  ADL's. Requires no assist devices for ambulation.  Patient denies active home care or home care needs.  Demographics and contact information confirmed.  Will continue to monitor patient for all home going needs.  Lyric Paz RN TCC via Epic.    Falls- none   Equipment -none  HC -none  Picc/Port- none  SW- none  O2/Cpap- none  Diabetes- none   Dialysis- none  PCP- Dr. Garfield Pal-  last seen 1 1/2 yr ago  Pharm- [facility], [place]    Transport at discharge- spouse   Therapy  Recommendations- none at this time

## 2025-06-08 NOTE — CARE PLAN
The patient's goals for the shift include      The clinical goals for the shift include Pt will remain HDS      Problem: Pain - Adult  Goal: Verbalizes/displays adequate comfort level or baseline comfort level  Outcome: Progressing     Problem: Safety - Adult  Goal: Free from fall injury  Outcome: Progressing     Problem: Discharge Planning  Goal: Discharge to home or other facility with appropriate resources  Outcome: Progressing     Problem: Chronic Conditions and Co-morbidities  Goal: Patient's chronic conditions and co-morbidity symptoms are monitored and maintained or improved  Outcome: Progressing     Problem: Nutrition  Goal: Nutrient intake appropriate for maintaining nutritional needs  Outcome: Progressing     Problem: Skin  Goal: Decreased wound size/increased tissue granulation at next dressing change  Outcome: Progressing  Goal: Participates in plan/prevention/treatment measures  Outcome: Progressing  Goal: Prevent/manage excess moisture  Outcome: Progressing  Goal: Prevent/minimize sheer/friction injuries  Outcome: Progressing  Goal: Promote/optimize nutrition  Outcome: Progressing  Goal: Promote skin healing  Outcome: Progressing

## 2025-06-08 NOTE — SIGNIFICANT EVENT
Consult placed by primary team for recommendations regarding post op BP control. In brief, patient is a 59 yo F w/PMHx HTN (off meds per PCP note from 2023), rheumatic fever, MVP, R breast cancer s/p R mastectomy w/BULMARO reconstruction with plastics 6/6.     Per chart review, patient pressures have ranged between SBP 120s-170s/50s-70s since admission. Asymptomatic, no CP, SOB, HA, vision changes. Doesn't take any BP meds as outpatient. Labs without FAMILIA or other electrolyte abnormalities. Primary team's BP goal is at least above >110/>60. Advised that patient has been off BP meds for @ least 2 years, and her elevated BP may be iso pain post-op. To ensure control, recommended prn PO hydral 25 TID for SBP > 180 without initiation of any other chronic meds such as amlodipine. Continue excellent pain control per primary.

## 2025-06-08 NOTE — CARE PLAN
Problem: Pain - Adult  Goal: Verbalizes/displays adequate comfort level or baseline comfort level  Outcome: Progressing     Problem: Safety - Adult  Goal: Free from fall injury  Outcome: Progressing     Problem: Discharge Planning  Goal: Discharge to home or other facility with appropriate resources  Outcome: Progressing     Problem: Chronic Conditions and Co-morbidities  Goal: Patient's chronic conditions and co-morbidity symptoms are monitored and maintained or improved  Outcome: Progressing     Problem: Nutrition  Goal: Nutrient intake appropriate for maintaining nutritional needs  Outcome: Progressing     Problem: Skin  Goal: Decreased wound size/increased tissue granulation at next dressing change  Outcome: Progressing  Flowsheets (Taken 6/7/2025 2029)  Decreased wound size/increased tissue granulation at next dressing change: Promote sleep for wound healing  Goal: Participates in plan/prevention/treatment measures  Outcome: Progressing  Flowsheets (Taken 6/7/2025 2029)  Participates in plan/prevention/treatment measures: Increase activity/out of bed for meals  Goal: Prevent/manage excess moisture  Outcome: Progressing  Flowsheets (Taken 6/7/2025 2029)  Prevent/manage excess moisture: Monitor for/manage infection if present  Goal: Prevent/minimize sheer/friction injuries  Outcome: Progressing  Flowsheets (Taken 6/7/2025 2029)  Prevent/minimize sheer/friction injuries: Increase activity/out of bed for meals  Goal: Promote/optimize nutrition  Outcome: Progressing  Flowsheets (Taken 6/7/2025 2029)  Promote/optimize nutrition: Monitor/record intake including meals  Goal: Promote skin healing  Outcome: Progressing  Flowsheets (Taken 6/7/2025 2029)  Promote skin healing: Assess skin/pad under line(s)/device(s)

## 2025-06-08 NOTE — SIGNIFICANT EVENT
Department of Plastic and Reconstructive Surgery  Significant Event     Patient Name: Yvrose John  MRN: [id number]  Date:  06/07/25         Subjective   Patient is resting comfortably in bed. Repeat BP is 145/69. Vioptix averaging 70% with strong doppler sounds. No pain at this time. Due for pain meds a 12AM. YESSICA drains and abdominal wound vac to suction without alarm or leak.     Constitutional: A&Ox3, calm and cooperative, NAD.  Eyes: EOMI, clear sclera.  Head/Neck: Neck supple, trachea midline.  Cardiovascular: Regular rate.  Respiratory/Thorax: Good symmetric chest expansion, breathing comfortably on NC   Gastrointestinal: Abdomen soft, ND/NT.   Neurological: A&Ox3, cranial nerves II-XII grossly intact  Psychological: Appropriate mood and behavior.  MSK: ROM intact, ATKINS actively. LUE with nonpitting edema noted to L hand and forearm, radial and ulnar pulses palpable.  Skin: Warm and dry, no rashes or lesions. See dedicated flap/surgical site exam.   Flap/surgical sites: BULMARO flap recipient site to Right breast which is appropriate color, warm to touch, soft. Flap approximated with intact sutures without evidence of bleeding or dehiscence. Intact Doppler pulse at marked site. Flap edges which are intact. Nitrobid applied to superior portion of the right breast above the incision line/radiated skin. Flap donor site of lower abdomen which is dressed with  incisional wound vac connected to hospital vac device. Wound vac dressing intact and device maintaining continuous suction at 125 mmHg without alarm, leak, obstruction or malfunction. No output appreciated in collecting canister. 3 YESSICA drains (1 to RLQ, 1 to LLQ, and 1 to right breast) with moderate serosanguinous output in bulbs. Umbilicus dressed with Nitrobid, Aquacel and Tegaderm.  There is dark brown/old blood drainage within the contents of the Tegaderm that is new compared to yesterday's exam. Of note, Left posterior upper arm has minimal bruising from  heparin subcutaneous injections that is not consistent with HIT related necrosis.     Plan/Recommendations  # S/p Right Mastectomy with BULMARO Reconstruction    -BP continues to downtrend without medical intervention  -Patient doing well and following post operative course  - Continue with doppler checks per plastics and nursing   - For all other care and details please refer to daily progress note.     Bronwyn Dao PA-C   Plastic and Reconstructive Surgery   [facility]  Pager #[phone]  Team phone: [phone]

## 2025-06-08 NOTE — SIGNIFICANT EVENT
Department of Plastic and Reconstructive Surgery  Significant Event     Patient Name: Yvrose John  MRN: [id number]  Date:  06/07/25         Subjective   Patient is resting comfortably in bed. Repeat BP is 156/69 this AM. Vioptix averaging 70% with strong doppler sounds. She is now experiencing some aching pain in her right breast however, she is due for pain meds shortly. Of note, she reports a sharp, shooting sensation at the center/left of her abdominal incision when she is swinging her left leg into pain. She reports that it is self resolving an only associated with this movement. YESSICA drains and wound vac continue to suction appropriately without leak or malfunction.     Constitutional: A&Ox3, calm and cooperative, NAD.  Eyes: EOMI, clear sclera.  Head/Neck: Neck supple, trachea midline.  Cardiovascular: Regular rate.  Respiratory/Thorax: Good symmetric chest expansion, breathing comfortably on NC   Gastrointestinal: Abdomen soft, ND/NT.   Neurological: A&Ox3, cranial nerves II-XII grossly intact  Psychological: Appropriate mood and behavior.  MSK: ROM intact, ATKINS actively. LUE with nonpitting edema noted to L hand and forearm, radial and ulnar pulses palpable.  Skin: Warm and dry, no rashes or lesions. See dedicated flap/surgical site exam.   Flap/surgical sites: BULMARO flap recipient site to Right breast which is appropriate color, warm to touch, soft. Flap approximated with intact sutures without evidence of bleeding or dehiscence. Intact Doppler pulse at marked site. Flap edges which are intact. Nitrobid applied to superior portion of the right breast above the incision line/radiated skin. Flap donor site of lower abdomen which is dressed with  incisional wound vac connected to hospital vac device. Wound vac dressing intact and device maintaining continuous suction at 125 mmHg without alarm, leak, obstruction or malfunction. No output appreciated in collecting canister. 3 YESSICA drains (1 to RLQ, 1 to LLQ,  "and 1 to right breast) with moderate serosanguinous output in bulbs. Umbilicus dressed with Nitrobid, Aquacel and Tegaderm.  There is dark brown/old blood drainage within the contents of the Tegaderm that is new compared to yesterday's exam. Of note, Left posterior upper arm has minimal bruising from heparin subcutaneous injections that is not consistent with HIT related necrosis.     Plan/Recommendations  # S/p Right Mastectomy with BULMARO Reconstruction    - BP remains within normal limits this morning   - No episodes of SBP > 180  - Patient doing well and following post operative course  - Reviewed that sharp shooting sensation can be related to neuropathic pain vs \"pulling\" suture since it is limited to a specific movement. Will continue to monitor  - Due for Celebrex and Gabapentin this AM   - Continue with doppler and flap checks per plastics and nursing   - For all other care and details please refer to daily progress note.     Bronwyn Dao PA-C   Plastic and Reconstructive Surgery   [place]  Pager #[phone]  Team phone: [phone]     "

## 2025-06-08 NOTE — SIGNIFICANT EVENT
Department of Plastic and Reconstructive Surgery  Significant Event     Patient Name: Yvrose John  MRN: [id number]  Date:  06/07/25         Subjective   Patient is resting comfortably in bed. Repeat BP is 162/60 mm Hg.  Vioptix averaging 68% with strong doppler sounds. She reports no pain, tolerating diet, voiding spontaneously and + BM. Of note, she reports new onset of right chest wall swelling above her right flap. She is not sure when this started and notes that it is not painful to touch. YESSICA drains show serosanguinous output and wound vac is holding appropriate suction. Denies any fever, chills, night sweats, CP, SOB, palpitations, nausea, vomiting, diarrhea or constipation,    Constitutional: A&Ox3, calm and cooperative, NAD.  Eyes: EOMI, clear sclera.  Head/Neck: Neck supple, trachea midline.  Cardiovascular: Regular rate.  Respiratory/Thorax: Good symmetric chest expansion, breathing comfortably on NC   Gastrointestinal: Abdomen soft, ND/NT.   Neurological: A&Ox3, cranial nerves II-XII grossly intact  Psychological: Appropriate mood and behavior.  MSK: ROM intact, ATKINS actively. LUE with nonpitting edema noted to L hand and forearm, radial and ulnar pulses palpable.  Skin: Warm and dry, no rashes or lesions. See dedicated flap/surgical site exam.   Flap/surgical sites: BULMARO flap recipient site to Right breast which is appropriate color, warm to touch, soft. Flap approximated with intact sutures without evidence of bleeding or dehiscence. Intact Doppler pulse at marked site. Flap edges which are intact. Nitrobid applied to superior portion of the right breast above the incision line/radiated skin. Of note, there is palpable swelling noted above the most superior incision of flap that is mobile and not painful to touch. There are no signs of infection or skin changes on exam. Flap donor site of lower abdomen which is dressed with  incisional wound vac connected to hospital vac device. Wound vac dressing  intact and device maintaining continuous suction at 125 mmHg without alarm, leak, obstruction or malfunction. No output appreciated in collecting canister. 3 YESSICA drains (1 to RLQ, 1 to LLQ, and 1 to right breast) with moderate serosanguinous output in bulbs. Umbilicus dressed with Nitrobid, Aquacel and Tegaderm.  There is dark brown/old blood drainage within the contents of the Tegaderm that is new compared to yesterday's exam. Of note, Left posterior upper arm has minimal bruising from heparin subcutaneous injections that is not consistent with HIT related necrosis.     Plan/Recommendations  # S/p Right Mastectomy with BULMARO Reconstruction    - Patient doing well and following post operative course  - New onset of swelling superior to right breast flap. Plans to continue to monitor  - Tentative plans for discharge tomorrow   - Continue with doppler and flap checks per plastics and nursing   - For all other care and details please refer to daily progress note.     Plan and care discussed with Dr Tania VALENTIN   Plastic and Reconstructive Surgery   [facility]  Pager #[phone]  Team phone: [phone]

## 2025-06-08 NOTE — PROGRESS NOTES
Postop Pain HPI -   Palliative: relieved with IV analgesics and regional local anesthetics  Provocative: movement  Quality:  burning and aching  Radiation:  none  Severity:  1/10  Timing: constant    24-HOUR OPIOID CONSUMPTION:  5 mg oxycodone, no dilaudid    Scheduled medications  Scheduled Medications[1]  Continuous medications  Continuous Medications[2]  PRN medications  PRN Medications[3]     Physical Exam:  Constitutional:  no distress, alert and cooperative  Eyes: clear sclera  Head/Neck: No apparent injury, trachea midline  Respiratory/Thorax: Patent airways, thorax symmetric, breathing comfortably  Cardiovascular: no pitting edema  Gastrointestinal: Nondistended  Musculoskeletal: ROM intact  Extremities: no clubbing  Neurological: alert, bernstein x4  Psychological: Appropriate affect    Results for orders placed or performed during the hospital encounter of 06/06/25 (from the past 24 hours)   Type and screen   Result Value Ref Range    ABO TYPE A     Rh TYPE NEG     ANTIBODY SCREEN NEG    Basic Metabolic Panel   Result Value Ref Range    Glucose 97 74 - 99 mg/dL    Sodium 145 136 - 145 mmol/L    Potassium 3.5 3.5 - 5.3 mmol/L    Chloride 110 (H) 98 - 107 mmol/L    Bicarbonate 28 21 - 32 mmol/L    Anion Gap 11 10 - 20 mmol/L    Urea Nitrogen 12 6 - 23 mg/dL    Creatinine 0.71 0.50 - 1.05 mg/dL    eGFR >90 >60 mL/min/1.73m*2    Calcium 8.8 8.6 - 10.6 mg/dL   CBC and Auto Differential   Result Value Ref Range    WBC 6.9 4.4 - 11.3 x10*3/uL    nRBC 0.0 0.0 - 0.0 /100 WBCs    RBC 2.80 (L) 4.00 - 5.20 x10*6/uL    Hemoglobin 9.1 (L) 12.0 - 16.0 g/dL    Hematocrit 27.8 (L) 36.0 - 46.0 %    MCV 99 80 - 100 fL    MCH 32.5 26.0 - 34.0 pg    MCHC 32.7 32.0 - 36.0 g/dL    RDW 13.2 11.5 - 14.5 %    Platelets 121 (L) 150 - 450 x10*3/uL    Neutrophils % 74.2 40.0 - 80.0 %    Immature Granulocytes %, Automated 0.4 0.0 - 0.9 %    Lymphocytes % 14.6 13.0 - 44.0 %    Monocytes % 10.1 2.0 - 10.0 %    Eosinophils % 0.4 0.0 - 6.0 %     Basophils % 0.3 0.0 - 2.0 %    Neutrophils Absolute 5.09 1.20 - 7.70 x10*3/uL    Immature Granulocytes Absolute, Automated 0.03 0.00 - 0.70 x10*3/uL    Lymphocytes Absolute 1.00 (L) 1.20 - 4.80 x10*3/uL    Monocytes Absolute 0.69 0.10 - 1.00 x10*3/uL    Eosinophils Absolute 0.03 0.00 - 0.70 x10*3/uL    Basophils Absolute 0.02 0.00 - 0.10 x10*3/uL   Magnesium   Result Value Ref Range    Magnesium 2.18 1.60 - 2.40 mg/dL      Yvrose John is a 60 y.o. year old female patient with PMH of right breast cancer, T2 N1 M0 invasive ductal carcinoma, metastasized to right axillary lymph node s/p Right lumpectomy with removal of 18 lymph nodes 2022 and chemo/radiation with continue breast symptoms who presents for Right simple mastectomy with reconstruction with free flap with Bel Maldonado and Phil Crockett on 6/6/25. Acute Pain consulted for block for postoperative pain control.      Plan:  - Right paravertebral block with catheter performed preoperatively on 6/6/25  - R paravertebral catheter will be removed at approximately 12:30 pm (4hrs after last subcutaneous heparin dose)  - Acute pain service will sign off once catheter is removed  - Rest of pain management per primary team     Acute Pain Team  pg [phone] ph [phone].        [1] acetaminophen, 975 mg, oral, q8h HAYLEY  [Held by provider] anastrozole, 1 mg, oral, Daily  aspirin, 81 mg, oral, Daily  celecoxib, 100 mg, oral, BID  docusate sodium, 100 mg, oral, BID  gabapentin, 300 mg, oral, q8h AHYLEY  heparin (porcine), 5,000 Units, subcutaneous, q8h  methocarbamol, 500 mg, oral, q6h HAYLEY  nitroglycerin, 0.5 inch, transdermal, Daily  pantoprazole, 40 mg, oral, Daily before breakfast  [2] ropivacaine (PF) in NS cmpd, 10 mL/hr, Last Rate: 7 mL/hr (06/07/25 1334)  [3] PRN medications: hydrALAZINE, magnesium hydroxide, ondansetron **OR** ondansetron, traMADol

## 2025-06-09 LAB
ANION GAP SERPL CALC-SCNC: 11 MMOL/L (ref 10–20)
BASOPHILS # BLD AUTO: 0.03 X10*3/UL (ref 0–0.1)
BASOPHILS NFR BLD AUTO: 0.4 %
BLOOD EXPIRATION DATE: NORMAL
BUN SERPL-MCNC: 11 MG/DL (ref 6–23)
CALCIUM SERPL-MCNC: 8.7 MG/DL (ref 8.6–10.6)
CHLORIDE SERPL-SCNC: 107 MMOL/L (ref 98–107)
CO2 SERPL-SCNC: 27 MMOL/L (ref 21–32)
CREAT SERPL-MCNC: 0.61 MG/DL (ref 0.5–1.05)
DISPENSE STATUS: NORMAL
EGFRCR SERPLBLD CKD-EPI 2021: >90 ML/MIN/1.73M*2
EOSINOPHIL # BLD AUTO: 0.12 X10*3/UL (ref 0–0.7)
EOSINOPHIL NFR BLD AUTO: 1.7 %
ERYTHROCYTE [DISTWIDTH] IN BLOOD BY AUTOMATED COUNT: 13.3 % (ref 11.5–14.5)
GLUCOSE SERPL-MCNC: 107 MG/DL (ref 74–99)
HCT VFR BLD AUTO: 25.9 % (ref 36–46)
HGB BLD-MCNC: 8.3 G/DL (ref 12–16)
IMM GRANULOCYTES # BLD AUTO: 0.03 X10*3/UL (ref 0–0.7)
IMM GRANULOCYTES NFR BLD AUTO: 0.4 % (ref 0–0.9)
LYMPHOCYTES # BLD AUTO: 1.11 X10*3/UL (ref 1.2–4.8)
LYMPHOCYTES NFR BLD AUTO: 15.4 %
MCH RBC QN AUTO: 33.3 PG (ref 26–34)
MCHC RBC AUTO-ENTMCNC: 32 G/DL (ref 32–36)
MCV RBC AUTO: 104 FL (ref 80–100)
MONOCYTES # BLD AUTO: 0.64 X10*3/UL (ref 0.1–1)
MONOCYTES NFR BLD AUTO: 8.9 %
NEUTROPHILS # BLD AUTO: 5.3 X10*3/UL (ref 1.2–7.7)
NEUTROPHILS NFR BLD AUTO: 73.2 %
NRBC BLD-RTO: 0 /100 WBCS (ref 0–0)
PLATELET # BLD AUTO: 145 X10*3/UL (ref 150–450)
POTASSIUM SERPL-SCNC: 3.4 MMOL/L (ref 3.5–5.3)
PRODUCT BLOOD TYPE: 600
PRODUCT CODE: NORMAL
RBC # BLD AUTO: 2.49 X10*6/UL (ref 4–5.2)
SODIUM SERPL-SCNC: 142 MMOL/L (ref 136–145)
UNIT ABO: NORMAL
UNIT NUMBER: NORMAL
UNIT RH: NORMAL
UNIT VOLUME: 350
WBC # BLD AUTO: 7.2 X10*3/UL (ref 4.4–11.3)
XM INTEP: NORMAL

## 2025-06-09 PROCEDURE — 85025 COMPLETE CBC W/AUTO DIFF WBC: CPT | Performed by: PHYSICIAN ASSISTANT

## 2025-06-09 PROCEDURE — 97161 PT EVAL LOW COMPLEX 20 MIN: CPT | Mod: GP

## 2025-06-09 PROCEDURE — P9016 RBC LEUKOCYTES REDUCED: HCPCS

## 2025-06-09 PROCEDURE — 1170000001 HC PRIVATE ONCOLOGY ROOM DAILY

## 2025-06-09 PROCEDURE — 36415 COLL VENOUS BLD VENIPUNCTURE: CPT | Performed by: PHYSICIAN ASSISTANT

## 2025-06-09 PROCEDURE — 2500000001 HC RX 250 WO HCPCS SELF ADMINISTERED DRUGS (ALT 637 FOR MEDICARE OP)

## 2025-06-09 PROCEDURE — 2500000001 HC RX 250 WO HCPCS SELF ADMINISTERED DRUGS (ALT 637 FOR MEDICARE OP): Performed by: PHYSICIAN ASSISTANT

## 2025-06-09 PROCEDURE — 99232 SBSQ HOSP IP/OBS MODERATE 35: CPT | Performed by: PHYSICIAN ASSISTANT

## 2025-06-09 PROCEDURE — 97165 OT EVAL LOW COMPLEX 30 MIN: CPT | Mod: GO

## 2025-06-09 PROCEDURE — 36430 TRANSFUSION BLD/BLD COMPNT: CPT

## 2025-06-09 PROCEDURE — 80048 BASIC METABOLIC PNL TOTAL CA: CPT

## 2025-06-09 PROCEDURE — 2500000005 HC RX 250 GENERAL PHARMACY W/O HCPCS

## 2025-06-09 PROCEDURE — 2500000004 HC RX 250 GENERAL PHARMACY W/ HCPCS (ALT 636 FOR OP/ED): Performed by: PHYSICIAN ASSISTANT

## 2025-06-09 PROCEDURE — 2500000002 HC RX 250 W HCPCS SELF ADMINISTERED DRUGS (ALT 637 FOR MEDICARE OP, ALT 636 FOR OP/ED): Performed by: PHYSICIAN ASSISTANT

## 2025-06-09 RX ORDER — TRAMADOL HYDROCHLORIDE 50 MG/1
50 TABLET, FILM COATED ORAL EVERY 6 HOURS PRN
Qty: 15 TABLET | Refills: 0 | OUTPATIENT
Start: 2025-06-09

## 2025-06-09 RX ORDER — DOCUSATE SODIUM 100 MG/1
100 CAPSULE, LIQUID FILLED ORAL 2 TIMES DAILY
Qty: 60 CAPSULE | OUTPATIENT
Start: 2025-06-09

## 2025-06-09 RX ORDER — GABAPENTIN 300 MG/1
300 CAPSULE ORAL EVERY 8 HOURS SCHEDULED
Qty: 90 CAPSULE | Refills: 11 | OUTPATIENT
Start: 2025-06-09

## 2025-06-09 RX ORDER — ACETAMINOPHEN 325 MG/1
975 TABLET ORAL EVERY 8 HOURS SCHEDULED
Qty: 126 TABLET | Refills: 0 | OUTPATIENT
Start: 2025-06-09 | End: 2025-06-23

## 2025-06-09 RX ORDER — METHOCARBAMOL 500 MG/1
500 TABLET, FILM COATED ORAL EVERY 6 HOURS SCHEDULED
Qty: 40 TABLET | Refills: 0 | OUTPATIENT
Start: 2025-06-09 | End: 2025-06-19

## 2025-06-09 RX ORDER — POTASSIUM CHLORIDE 20 MEQ/1
40 TABLET, EXTENDED RELEASE ORAL ONCE
Status: COMPLETED | OUTPATIENT
Start: 2025-06-09 | End: 2025-06-09

## 2025-06-09 RX ORDER — NITROGLYCERIN 20 MG/G
0.5 OINTMENT TOPICAL DAILY
Qty: 1 G | Refills: 0 | OUTPATIENT
Start: 2025-06-10 | End: 2025-06-15

## 2025-06-09 RX ORDER — CELECOXIB 100 MG/1
100 CAPSULE ORAL 2 TIMES DAILY
Qty: 28 CAPSULE | Refills: 0 | OUTPATIENT
Start: 2025-06-09 | End: 2025-06-23

## 2025-06-09 RX ORDER — NAPROXEN SODIUM 220 MG/1
81 TABLET, FILM COATED ORAL DAILY
Qty: 30 TABLET | Refills: 0 | OUTPATIENT
Start: 2025-06-10 | End: 2025-07-10

## 2025-06-09 RX ADMIN — METHOCARBAMOL 500 MG: 500 TABLET ORAL at 06:26

## 2025-06-09 RX ADMIN — POTASSIUM CHLORIDE 40 MEQ: 1500 TABLET, EXTENDED RELEASE ORAL at 10:27

## 2025-06-09 RX ADMIN — GABAPENTIN 300 MG: 300 CAPSULE ORAL at 06:26

## 2025-06-09 RX ADMIN — METHOCARBAMOL 500 MG: 500 TABLET ORAL at 11:53

## 2025-06-09 RX ADMIN — METHOCARBAMOL 500 MG: 500 TABLET ORAL at 00:27

## 2025-06-09 RX ADMIN — ACETAMINOPHEN 975 MG: 325 TABLET ORAL at 16:52

## 2025-06-09 RX ADMIN — HEPARIN SODIUM 5000 UNITS: 5000 INJECTION, SOLUTION INTRAVENOUS; SUBCUTANEOUS at 09:22

## 2025-06-09 RX ADMIN — NITROGLYCERIN 0.5 INCH: 20 OINTMENT TOPICAL at 10:20

## 2025-06-09 RX ADMIN — CELECOXIB 100 MG: 100 CAPSULE ORAL at 16:52

## 2025-06-09 RX ADMIN — METHOCARBAMOL 500 MG: 500 TABLET ORAL at 17:56

## 2025-06-09 RX ADMIN — GABAPENTIN 300 MG: 300 CAPSULE ORAL at 22:01

## 2025-06-09 RX ADMIN — CELECOXIB 100 MG: 100 CAPSULE ORAL at 06:26

## 2025-06-09 RX ADMIN — DOCUSATE SODIUM 100 MG: 100 CAPSULE, LIQUID FILLED ORAL at 22:01

## 2025-06-09 RX ADMIN — HEPARIN SODIUM 5000 UNITS: 5000 INJECTION, SOLUTION INTRAVENOUS; SUBCUTANEOUS at 00:27

## 2025-06-09 RX ADMIN — ACETAMINOPHEN 975 MG: 325 TABLET ORAL at 09:21

## 2025-06-09 RX ADMIN — PANTOPRAZOLE SODIUM 40 MG: 40 TABLET, DELAYED RELEASE ORAL at 06:26

## 2025-06-09 RX ADMIN — ASPIRIN 81 MG: 81 TABLET, CHEWABLE ORAL at 09:21

## 2025-06-09 RX ADMIN — ACETAMINOPHEN 975 MG: 325 TABLET ORAL at 00:27

## 2025-06-09 ASSESSMENT — PAIN - FUNCTIONAL ASSESSMENT
PAIN_FUNCTIONAL_ASSESSMENT: 0-10

## 2025-06-09 ASSESSMENT — COGNITIVE AND FUNCTIONAL STATUS - GENERAL
HELP NEEDED FOR BATHING: A LITTLE
MOVING FROM LYING ON BACK TO SITTING ON SIDE OF FLAT BED WITH BEDRAILS: A LITTLE
CLIMB 3 TO 5 STEPS WITH RAILING: A LITTLE
MOBILITY SCORE: 18
STANDING UP FROM CHAIR USING ARMS: A LITTLE
DAILY ACTIVITIY SCORE: 21
DAILY ACTIVITIY SCORE: 24
WALKING IN HOSPITAL ROOM: A LITTLE
DRESSING REGULAR LOWER BODY CLOTHING: A LITTLE
CLIMB 3 TO 5 STEPS WITH RAILING: A LITTLE
MOBILITY SCORE: 23
MOVING TO AND FROM BED TO CHAIR: A LITTLE
TURNING FROM BACK TO SIDE WHILE IN FLAT BAD: A LITTLE
DRESSING REGULAR UPPER BODY CLOTHING: A LITTLE

## 2025-06-09 ASSESSMENT — PAIN SCALES - GENERAL
PAINLEVEL_OUTOF10: 0 - NO PAIN

## 2025-06-09 ASSESSMENT — ACTIVITIES OF DAILY LIVING (ADL)
ADL_ASSISTANCE: INDEPENDENT
ADL_ASSISTANCE: INDEPENDENT

## 2025-06-09 NOTE — PROGRESS NOTES
Occupational Therapy    Evaluation    Patient Name: Yvrose John  MRN: [id number]  Today's Date: 6/9/2025  Time Calculation  Start Time: 1026  Stop Time: 1046  Time Calculation (min): 20 min    Assessment  IP OT Assessment  OT Assessment: Pt was able to complete functional bed mobility with SBA, able to recall precautions. able to complete functional transfers with CGA and functional ambulation with SBA using a wheeled walker. Anticipate independent with toileting.  Prognosis: Good  Barriers to Discharge Home: No anticipated barriers  Evaluation/Treatment Tolerance: Patient tolerated treatment well  Medical Staff Made Aware: Yes  End of Session Communication: Bedside nurse  End of Session Patient Position: Bed, 3 rail up, Alarm off, not on at start of session  Plan:  No Skilled OT: At baseline function  OT Frequency: OT eval only  OT Discharge Recommendations: Low intensity level of continued care  OT Recommended Transfer Status: Stand by assist  OT - OK to Discharge: Yes    Subjective   Current Problem:  1. Lymphedema of breast  Surgical Pathology Exam    Surgical Pathology Exam    Surgical Pathology Exam    Surgical Pathology Exam    Surgical Pathology Exam    Surgical Pathology Exam      2. Acquired breast deformity  Surgical Pathology Exam    Surgical Pathology Exam    Surgical Pathology Exam    Surgical Pathology Exam    Surgical Pathology Exam    Surgical Pathology Exam      3. Breast pain  Surgical Pathology Exam    Surgical Pathology Exam    Surgical Pathology Exam    Surgical Pathology Exam    Surgical Pathology Exam    Surgical Pathology Exam        General:  Reason for Referral: S/p Right mastectomy with unilateral BULMARO reconstruction with Dr Piedra and Dr Liz on 6/6/2025  Past Medical History Relevant to Rehab: heart murmur, MVP and of right breast cancer that metastasized to right axillary lymph node. Right lumpectomy and excision of 18 lymph nodes 11/30/22 with Dr. Maldonado. Following surgery, she  completed 29 radiation treatments by March 2023 and 20 chemo treatments by October 2023.  Co-Treatment: PT  Co-Treatment Reason: Pt seen with OT to assist with maintaining strict post op precautions & pt safety.  Prior to Session Communication: Bedside nurse  Patient Position Received: Bed, 2 rail up, Alarm off, not on at start of session  Family/Caregiver Present: No   Precautions:  Medical Precautions: Fall precautions  Precautions Comment: Patient to maintain beach chair position with hips flexed when sitting, laying, standing and ambulating.  OK for patient to be OOB with hips flexed at all times; YESSICA x3, wound vac    Pain:  Pain Assessment  Pain Assessment: 0-10  0-10 (Numeric) Pain Score: 0 - No pain        Objective   Cognition:  Overall Cognitive Status: Within Functional Limits  Orientation Level: Oriented X4           Home Living:  Type of Home: House  Lives With: Spouse  Home Adaptive Equipment: None  Home Layout: Two level, Able to live on main level with bedroom/bathroom, Laundry main level  Home Access: Stairs to enter with rails  Bathroom Shower/Tub: Walk-in shower  Bathroom Equipment: Shower chair with back   Prior Function:  Level of [place]: Independent with ADLs and functional transfers, Independent with homemaking with ambulation  Receives Help From: Family  ADL Assistance: Independent  Homemaking Assistance: Independent  Ambulatory Assistance: Independent  Vocational: Full time employment    ADL:  Eating Assistance: Independent  Toileting Assistance with Device: Independent  Toileting Deficit:  (anticipate)  Activity Tolerance:  Endurance: Endurance does not limit participation in activity  Balance:  Dynamic Standing Balance  Dynamic Standing-Balance Support: Bilateral upper extremity supported  Dynamic Standing-Level of Assistance: Distant supervision  Dynamic Standing-Comments: Presented with good dynamic standing balance.  Bed Mobility/Transfers: Bed Mobility  Bed Mobility: Yes  Bed  Mobility 1  Bed Mobility 1: Supine to sitting, Sitting to supine  Level of Assistance 1: Close supervision, Moderate verbal cues  Bed Mobility Comments 1: HOB elevated   and Transfers  Transfer: Yes  Transfer 1  Transfer From 1: Bed to  Transfer to 1: Stand  Technique 1: Sit to stand, Stand to sit  Transfer Device 1: Walker  Transfer Level of Assistance 1: Contact guard     Vision: Vision - Basic Assessment  Current Vision: No visual deficits   and Vision - Complex Assessment  Ocular Range of Motion: Within Functional Limits  Sensation:  Light Touch: No apparent deficits    Perception:  Inattention/Neglect: Appears intact  Coordination:  Movements are Fluid and Coordinated: Yes   Hand Function:  Hand Function  Gross Grasp: Functional  Coordination: Functional  Extremities: RUE   RUE : Within Functional Limits, LUE   LUE: Within Functional Limits,      Outcome Measures: [facility] Daily Activity  Putting on and taking off regular lower body clothing: None  Bathing (including washing, rinsing, drying): None  Putting on and taking off regular upper body clothing: None  Toileting, which includes using toilet, bedpan or urinal: None  Taking care of personal grooming such as brushing teeth: None  Eating Meals: None  Daily Activity - Total Score: 24   OT Adult Other Outcome Measures  4AT: negative    Education Documentation  Body Mechanics, taught by Julienne Camarena OT at 6/9/2025  4:26 PM.  Learner: Patient  Readiness: Acceptance  Method: Explanation  Response: Verbalizes Understanding  Comment: Adaptive ways to complete ADLs.    Precautions, taught by Julienne Camarena OT at 6/9/2025  4:26 PM.  Learner: Patient  Readiness: Acceptance  Method: Explanation  Response: Verbalizes Understanding  Comment: Adaptive ways to complete ADLs.    ADL Training, taught by Julienne Camarena OT at 6/9/2025  4:26 PM.  Learner: Patient  Readiness: Acceptance  Method: Explanation  Response: Verbalizes Understanding  Comment: Adaptive ways to  complete ADLs.    Education Comments  No comments found.            06/09/25 at 4:27 PM   Julienne Camarena OTR/OTD  Rehab Office: [phone]

## 2025-06-09 NOTE — PROGRESS NOTES
"  Department of Plastic and Reconstructive Surgery  Progress Note    Patient Name: Yvrose John  MRN: [id number]  Date:  06/09/25     Subjective  Patient resting comfortably in bed on exam. She pain controlled but not sleeping well at night. Continues to have swelling to L hand. Had BM yesterday. Doppler sounds are strong. Vioptix is averaging 70% with 96 signal strength. YESSICA drains x3 in place, and wound vac is to suction without leak. Denies any fever, chills, night sweats, CP, SOB, palpitations, nausea or vomiting.     Overnight Events  Concern for some swelling superior R breast overnight which was monitored closely. Patients states its more of a fullness or pressure. Area remained soft and compressible with little to no tenderness. Some bruising to lateral superior portion of the skin above flap towards the armpit with some tenderness. Hgb on AM labs 8.3 so 1 unit PRBC ordered.    Objective    Vital Signs  /78   Pulse 88   Temp 37 °C (98.6 °F) (Temporal)   Resp 16   Ht 1.6 m (5' 2.99\")   Wt 93.1 kg (205 lb 4 oz)   SpO2 94%   BMI 36.37 kg/m²      Constitutional: A&Ox3, calm and cooperative, NAD.  Eyes: EOMI, clear sclera.  Head/Neck: Neck supple, trachea midline.  Cardiovascular: Regular rate.  Respiratory/Thorax: Good symmetric chest expansion, breathing comfortably on RA  Gastrointestinal: Abdomen soft, ND/NT.   Neurological: A&Ox3, cranial nerves II-XII grossly intact  Psychological: Appropriate mood and behavior.  MSK: ROM intact, ATKINS actively. LUE with nonpitting edema noted to L hand and forearm, radial and ulnar pulses palpable.  Skin: Warm and dry, no rashes or lesions. Left posterior upper arm with mild ecchymosis from subcutaneous heparin injections, no skin necrosis. Also L wrist with mild ecchymosis from previous arterial line, no skin necrosis. See additional dedicated flap/surgical site exam.   Flap/surgical sites: BULMARO flap recipient site to Right breast which is appropriate color, " warm to touch, soft. Flap approximated with intact sutures without evidence of bleeding or dehiscence. Intact Doppler pulse at marked site. Flap edges which are intact. Nitrobid applied to superior portion of the right breast above the incision line/radiated skin. Flap donor site of lower abdomen which is dressed with incisional wound vac connected to hospital vac device. Wound vac dressing intact and device maintaining continuous suction at 125 mmHg without alarm, leak, obstruction or malfunction. No output appreciated in collecting canister. 3 YESSICA drains (1 to RLQ, 1 to LLQ, and 1 to right breast) with moderate serosanguinous output in bulbs. Umbilicus dressing changed this morning with Nitrobid, Aquacel and Tegaderm.     Diagnostics   Results for orders placed or performed during the hospital encounter of 06/06/25 (from the past 24 hours)   Basic Metabolic Panel   Result Value Ref Range    Glucose 107 (H) 74 - 99 mg/dL    Sodium 142 136 - 145 mmol/L    Potassium 3.4 (L) 3.5 - 5.3 mmol/L    Chloride 107 98 - 107 mmol/L    Bicarbonate 27 21 - 32 mmol/L    Anion Gap 11 10 - 20 mmol/L    Urea Nitrogen 11 6 - 23 mg/dL    Creatinine 0.61 0.50 - 1.05 mg/dL    eGFR >90 >60 mL/min/1.73m*2    Calcium 8.7 8.6 - 10.6 mg/dL   CBC and Auto Differential   Result Value Ref Range    WBC 7.2 4.4 - 11.3 x10*3/uL    nRBC 0.0 0.0 - 0.0 /100 WBCs    RBC 2.49 (L) 4.00 - 5.20 x10*6/uL    Hemoglobin 8.3 (L) 12.0 - 16.0 g/dL    Hematocrit 25.9 (L) 36.0 - 46.0 %     (H) 80 - 100 fL    MCH 33.3 26.0 - 34.0 pg    MCHC 32.0 32.0 - 36.0 g/dL    RDW 13.3 11.5 - 14.5 %    Platelets 145 (L) 150 - 450 x10*3/uL    Neutrophils % 73.2 40.0 - 80.0 %    Immature Granulocytes %, Automated 0.4 0.0 - 0.9 %    Lymphocytes % 15.4 13.0 - 44.0 %    Monocytes % 8.9 2.0 - 10.0 %    Eosinophils % 1.7 0.0 - 6.0 %    Basophils % 0.4 0.0 - 2.0 %    Neutrophils Absolute 5.30 1.20 - 7.70 x10*3/uL    Immature Granulocytes Absolute, Automated 0.03 0.00 - 0.70  x10*3/uL    Lymphocytes Absolute 1.11 (L) 1.20 - 4.80 x10*3/uL    Monocytes Absolute 0.64 0.10 - 1.00 x10*3/uL    Eosinophils Absolute 0.12 0.00 - 0.70 x10*3/uL    Basophils Absolute 0.03 0.00 - 0.10 x10*3/uL   Prepare RBC: 1 Units   Result Value Ref Range    PRODUCT CODE T2037F77     Unit Number [id number]     Unit ABO A     Unit RH NEG     XM INTEP COMP     Dispense Status IS     Blood Expiration Date 6/27/2025 11:59:00 PM EDT     PRODUCT BLOOD TYPE 0600     UNIT VOLUME 350      *Note: Due to a large number of results and/or encounters for the requested time period, some results have not been displayed. A complete set of results can be found in Results Review.     Imaging  No results found.    Cardiology, Vascular, and Other Imaging  No other imaging results found for the past 7 days      Current Medications  Scheduled medications  Scheduled Medications[1]  Continuous medications  Continuous Medications[2]  PRN medications  PRN Medications[3]     Assessment  Yvrose John is a 60 y.o. female with a past medical history of heart murmur, MVP and of right breast cancer that metastasized to right axillary lymph node. Right lumpectomy and excision of 18 lymph nodes 11/30/22 with Dr. Maldonado. Following surgery, she completed 29 radiation treatments by March 2023 and 20 chemo treatments by October 2023. S/p Right mastectomy with unilateral BULMARO reconstruction with Dr Piedra and Dr Liz on 6/6/2025.     Plan/Recommendations  # S/p Right Mastectomy with BULMARO Reconstruction    - Continue serial flap assessments Q2hour per nursing and interval checks per plastic surgery team       ·  Assess Doppler pulse at marked site plus flap appearance (color, warmth, turgor)       ·  Nursing to notify plastic surgery team immediately if there are any acute changes          (Including decreased Doppler signal, pallor, temperature change, bleeding, etc.)  - Okay to discontinue raul hugger POD#3 (6/9)  - Monitor surgical sites for  S/S of bleeding, infection or dehiscence   - Daily application of Nitrobid paste to R superomedial breast per plastic surgery team  - Umbilicus dressed with Nitrobid, Aquacel and Tegaderm, no need to replace until follow-up  - Continue YESSICA drain care per nursing (x3 YESSICA drains)        ·  Strip drain tubing TID and PRN       ·  Monitor and record output [id number]        ·  Keep drain sites C/D/I with daily drain dressing changes        ·  Call plastics if drain output is >30cc in an hour or greater than 200cc over 8 hours  - Continue incisional wound vac therapy to abdomen x10-14 days post op (~6/20)        ·  Settings: 125 mmHg low continuous suction        ·  Please keep dressing C/D/I, reinforce PRN        ·  Record vac output per nursing q8h       ·  Please alert plastics team with any concerns regarding vac        ·  Plan to transition to Prevena homegoing vac on day of discharge    - Keep right breast flap carefully positioned without pressure         ·  Avoid positioning that would apply pressure to flap region or incisions   - Patient to maintain beach chair position with hips flexed when sitting, laying, standing and ambulating.    - OK for patient to be OOB with hips flexed at all times  - OK for regular diet   - Maintain BP of 110/60 minimum to ensure adequate flap perfusion   - Low transfusion threshold to ensure adequate flap perfusion  - Discontinued VTE anticoagulation AM 6/9, no need to start lovenox in prep for discharge tomorrow morning    - Daily ASA to start on POD #1 and continue x30 days post op (end date July 6th 2025)   - Monitor VS/pulse ox [id number]   - Monitor AM CBC/RFP/Mg     # Acute postoperative pain  - Well controlled per patient, will transition to Oxycodone PRN to Tramadol PRN. R paravertebral block removed per acute pain service on 6/8.       ·  Scheduled Tylenol 975 mg PO A5olnur        ·  Scheduled Gabapentin 300 mg [id number]       ·  Scheduled Robaxin 500 mg [id number]       ·  Scheduled  Celebrex 100 mg BID       ·  PRN Tramadol 50 mg U3oldib for severe pain  - Bowel regimen with Colace 100 mg PO BID  - IV Zofran PRN nausea due to narcotics   - Pain assessments [id number]     # Anemia   - Historical baseline HGB during prior admission/Incoming HGB on admission 13.0  - HGB on AM at 8.3, ordered 1 unit PRBC  - Presently no S/S of bleeding, considered likely due to intraoperative blood loss   - Keep T&S UTD, last obtained 6/8  - Monitor for S/S of bleeding or symptomatic anemia   - Low transfusion threshold to ensure adequate flap perfusion  - Transfuse for HGB <9 per protocol   - Monitor AM CBC     # Post-operative BP fluctuation  - Overnight on 6/6, patient with three reported episodes of diastolic hypotension to the 40s. IV fluids increased from 100 to 125 ml per hour.  - On 6/7 AM, BP dom to 129/50 then 175/86. IV fluids were decreased down to 50 ml per hour, and most recent reading at 159/94. Fluids were discontinued. BP continued to range from 150s-160s/70s overnight on 6/7 and into 6/8.   - Gen Medicine team consulted on 6/7 PM, given patient asymptomatic and no previous history of HTN, advised prn PO hydralazine 25 TID for SBP>180, otherwise no initiation of other chronic HTN meds required.     # Thrombocytopenia  - Historical baseline platelets in 180s  - Platelets at 135 on 6/6 PM, down to 119 on 6/7, increased to 121 6/8, up to 145 6/9    # Hx of Breast Cancer  - Currently stable, continue to hold home Arimidex due to wound healing risks     Prophylaxis:   - DVT: discontinued SQ heparin AM 6/9 with no need to transition to SQ Lovenox as patient discharging tomorrow morning 6/10.  ASA 81 to start/resume POD#1 then continue once daily, SCDs  - Encourage IS x10 every hour while awake   - PPI: Protonix 40 mg daily  - Bowel regimen: Colace 100mg BID        Disposition: Continue care on RNF. Will remain inpatient for continued flap monitoring and vac/drain surveillance postoperatively. Follow up  appointment with plastic surgery department scheduled for 6/23    Patient evaluated and plan discussed with KUSH MichelC    Plastic and Reconstructive Surgery   [place]  Pager #[phone]  Team phone: [phone]           [1] acetaminophen, 975 mg, oral, q8h HAYLEY  [Held by provider] anastrozole, 1 mg, oral, Daily  aspirin, 81 mg, oral, Daily  celecoxib, 100 mg, oral, BID  docusate sodium, 100 mg, oral, BID  gabapentin, 300 mg, oral, q8h HAYLEY  methocarbamol, 500 mg, oral, q6h HAYLEY  nitroglycerin, 0.5 inch, transdermal, Daily  pantoprazole, 40 mg, oral, Daily before breakfast     [2]    [3] PRN medications: hydrALAZINE, magnesium hydroxide, ondansetron **OR** ondansetron, traMADol

## 2025-06-09 NOTE — SIGNIFICANT EVENT
Department of Plastic and Reconstructive Surgery  Significant Event     Patient Name: Yvrose John  MRN: [id number]  Date:  06/07/25         Subjective   Patient is resting comfortably in bed. Repeat BP is 160/67 mm Hg.  Vioptix averaging 69% with strong doppler sounds. Right chest swelling above the flap remains unchanged. YESSICA drains show serosanguinous output and wound vac is holding appropriate suction.     Physical Exam:   Constitutional: A&Ox3, calm and cooperative, NAD.  Eyes: EOMI, clear sclera.  Head/Neck: Neck supple, trachea midline.  Cardiovascular: Regular rate.  Respiratory/Thorax: Good symmetric chest expansion, breathing comfortably on NC   Gastrointestinal: Abdomen soft, ND/NT.   Neurological: A&Ox3, cranial nerves II-XII grossly intact  Psychological: Appropriate mood and behavior.  MSK: ROM intact, ATKINS actively. LUE with nonpitting edema noted to L hand and forearm, radial and ulnar pulses palpable.  Skin: Warm and dry, no rashes or lesions. See dedicated flap/surgical site exam.   Flap/surgical sites: BULMARO flap recipient site to Right breast which is appropriate color, warm to touch, soft. Flap approximated with intact sutures without evidence of bleeding or dehiscence. Intact Doppler pulse at marked site. Flap edges which are intact. Nitrobid applied to superior portion of the right breast above the incision line/radiated skin. Of note, there is palpable swelling noted above the most superior incision of flap that is mobile and not painful to touch. There are no signs of infection or skin changes on exam. Flap donor site of lower abdomen which is dressed with  incisional wound vac connected to hospital vac device. Wound vac dressing intact and device maintaining continuous suction at 125 mmHg without alarm, leak, obstruction or malfunction. No output appreciated in collecting canister. 3 YESSICA drains (1 to RLQ, 1 to LLQ, and 1 to right breast) with moderate serosanguinous output in bulbs.  Umbilicus dressed with Nitrobid, Aquacel and Tegaderm.  There is dark brown/old blood drainage within the contents of the Tegaderm that is new compared to yesterday's exam. Of note, Left posterior upper arm has minimal bruising from heparin subcutaneous injections that is not consistent with HIT related necrosis.     Plan/Recommendations  # S/p Right Mastectomy with BULMARO Reconstruction    - Patient doing well and following post operative course  - No change from right chest swelling compared to earlier two exams  - Will continue to monitor  - Continue with doppler and flap checks per plastics and nursing   - For all other care and details please refer to daily progress note.       Bronwyn Dao PA-C   Plastic and Reconstructive Surgery   [facility]  Pager #[phone]  Team phone: [phone]

## 2025-06-09 NOTE — SIGNIFICANT EVENT
"  Department of Plastic and Reconstructive Surgery  Significant Event     Patient Name: Yvrose John  MRN: [id number]  Date:  06/07/25         Subjective   Patient is resting comfortably in bed. Repeat BP is 163/66 mm Hg.  Vioptix averaging 68% with strong doppler sounds. Right chest swelling above the flap remains unchanged. Per patient, she feels this area is \"puffy\" but denies pain. YESSICA drains show serosanguinous output and wound vac is holding appropriate suction.     Physical Exam:   Constitutional: A&Ox3, calm and cooperative, NAD.  Eyes: EOMI, clear sclera.  Head/Neck: Neck supple, trachea midline.  Cardiovascular: Regular rate.  Respiratory/Thorax: Good symmetric chest expansion, breathing comfortably on NC   Gastrointestinal: Abdomen soft, ND/NT.   Neurological: A&Ox3, cranial nerves II-XII grossly intact  Psychological: Appropriate mood and behavior.  MSK: ROM intact, ATKINS actively. LUE with nonpitting edema noted to L hand and forearm, radial and ulnar pulses palpable.  Skin: Warm and dry, no rashes or lesions. See dedicated flap/surgical site exam.   Flap/surgical sites: BULMARO flap recipient site to Right breast which is appropriate color, warm to touch, soft. Flap approximated with intact sutures without evidence of bleeding or dehiscence. Intact Doppler pulse at marked site. Flap edges which are intact. Nitrobid applied to superior portion of the right breast above the incision line/radiated skin. Of note, there is palpable swelling noted above the most superior incision of flap that is mobile and not painful to touch. There are no signs of infection or skin changes on exam. Flap donor site of lower abdomen which is dressed with  incisional wound vac connected to hospital vac device. Wound vac dressing intact and device maintaining continuous suction at 125 mmHg without alarm, leak, obstruction or malfunction. No output appreciated in collecting canister. 3 YESISCA drains (1 to RLQ, 1 to LLQ, and 1 to " right breast) with moderate serosanguinous output in bulbs. Umbilicus dressed with Nitrobid, Aquacel and Tegaderm.  There is dark brown/old blood drainage within the contents of the Tegaderm that is new compared to yesterday's exam. Of note, Left posterior upper arm has minimal bruising from heparin subcutaneous injections that is not consistent with HIT related necrosis.     Plan/Recommendations  # S/p Right Mastectomy with BULMARO Reconstruction    - Patient doing well and following post operative course  - No change from right chest swelling compared to earlier exam  - Will continue to monitor  - Continue with doppler and flap checks per plastics and nursing   - For all other care and details please refer to daily progress note.       Bronwyn Dao PA-C   Plastic and Reconstructive Surgery   [facility]  Pager #[phone]  Team phone: [phone]

## 2025-06-09 NOTE — PROGRESS NOTES
Physical Therapy    Physical Therapy Evaluation    Patient Name: Yvrose John  MRN: [id number]  Department: [facility]  Room: [facility]6030A  Today's Date: 6/9/2025   Time Calculation  Start Time: 1026  Stop Time: 1046  Time Calculation (min): 20 min    Assessment/Plan   PT Assessment  PT Assessment Results: Decreased endurance, Impaired balance, Decreased mobility (precautions)  Rehab Prognosis: Good  Barriers to Discharge Home: No anticipated barriers  Evaluation/Treatment Tolerance: Patient tolerated treatment well  Medical Staff Made Aware: Yes  End of Session Communication: Bedside nurse  Assessment Comment: 61yo female presents S/p Right mastectomy with unilateral BULMARO reconstruction with dec balance, endurance & post op precautions.  Pt would benefit from continued therapy to address these deficits and improve safety and indep  End of Session Patient Position: Bed, 3 rail up, Alarm off, not on at start of session  IP OR SWING BED PT PLAN  Inpatient or Swing Bed: Inpatient  PT Plan  Treatment/Interventions: Bed mobility, Transfer training, Gait training, Stair training, Balance training, Endurance training, Therapeutic activity, Positioning  PT Plan: Ongoing PT  PT Frequency: 3 times per week  PT Discharge Recommendations: No PT needed after discharge  Equipment Recommended upon Discharge: Wheeled walker (pt declining WW for dc)  PT Recommended Transfer Status: Assist x1  PT - OK to Discharge: Yes (PT eval completed & recs made)    Subjective     PT Visit Info:  PT Received On: 06/09/25  General Visit Information:  General  Reason for Referral: S/p Right mastectomy with unilateral BULMARO reconstruction with Dr Piedra and Dr Liz on 6/6/2025  Past Medical History Relevant to Rehab: heart murmur, MVP and of right breast cancer that metastasized to right axillary lymph node. Right lumpectomy and excision of 18 lymph nodes 11/30/22 with Dr. Maldonado. Following surgery, she completed 29 radiation treatments by March 2023  and 20 chemo treatments by October 2023.  Family/Caregiver Present: No  Co-Treatment: OT  Co-Treatment Reason: Pt seen with OT to assist with maintaining strict post op precautions & pt safety  Prior to Session Communication: Bedside nurse  Patient Position Received: Bed, 2 rail up, Alarm off, not on at start of session  Preferred Learning Style: auditory, verbal, visual  General Comment: Pt sitting up in bed upon arrival; pleasant & cooperative, willing to participate in session  Home Living:  Home Living  Type of Home: House  Lives With: Spouse  Home Adaptive Equipment: None  Home Layout: Two level, Able to live on main level with bedroom/bathroom, Laundry main level  Home Access: Stairs to enter with rails  Entrance Stairs-Number of Steps: 3  Bathroom Shower/Tub: Walk-in shower  Bathroom Equipment: Shower chair with back  Home Living Comments: dtr coming to stay with pt post op  Prior Level of Function:  Prior Function Per Pt/Caregiver Report  Level of [place]: Independent with ADLs and functional transfers, Independent with homemaking with ambulation  Receives Help From: Family  ADL Assistance: Independent  Homemaking Assistance: Independent  Ambulatory Assistance: Independent  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  OK for patient to be OOB with hips flexed at all times; YESSICA x3, wound vac     Objective   Pain:  Pain Assessment  Pain Assessment: 0-10  0-10 (Numeric) Pain Score: 0 - No pain  Cognition:  Cognition  Overall Cognitive Status: Within Functional Limits  Insight: Within function limits  Impulsive: Within functional limits  Processing Speed: Within funtional limits    General Assessments:     Activity Tolerance  Endurance: Tolerates 10  - 20 min exercise with multiple rests    Sensation  Light Touch: No apparent deficits    Strength  Strength Comments: appears grossly WFL for functional mobility  Strength  Strength Comments: appears grossly WFL for functional mobility    Perception  Inattention/Neglect: Appears intact  Initiation: Appears intact  Motor Planning: Appears intact  Perseveration: Not present    Coordination  Movements are Fluid and Coordinated: Yes    Postural Control  Postural Control: Within Functional Limits    Static Sitting Balance  Static Sitting-Balance Support: Feet supported  Static Sitting-Level of Assistance: Independent    Static Standing Balance  Static Standing-Balance Support: Bilateral upper extremity supported  Static Standing-Level of Assistance: Close supervision  Dynamic Standing Balance  Dynamic Standing-Balance Support: Bilateral upper extremity supported  Dynamic Standing-Level of Assistance: Contact guard  Functional Assessments:  Bed Mobility  Bed Mobility: Yes  Bed Mobility 1  Bed Mobility 1: Supine to sitting, Sitting to supine  Level of Assistance 1: Close supervision, Moderate verbal cues  Bed Mobility Comments 1: HOB elevated, use of rails (pt states will sleep in recliner at home)    Transfers  Transfer: Yes  Transfer 1  Technique 1: Sit to stand, Stand to sit  Transfer Level of Assistance 1: Contact guard  Trials/Comments 1: cueing to maintain flexion    Ambulation/Gait Training  Ambulation/Gait Training Performed: Yes  Ambulation/Gait Training 1  Surface 1: Level tile  Device 1: Rolling walker  Assistance 1: Contact guard, Minimal verbal cues  Quality of Gait 1: Forward flexed posture, Decreased step length (dec sara)  Comments/Distance (ft) 1: 100' (cueing to maintain hip flex with good carryover)    Stairs  Stairs: No (declining trial on stairs - verbal & visual instructions provided with pt simulating/demonstrating while therapist cued pt while maintaining precautions)  Extremity/Trunk  Assessments:  RLE   RLE :  (grossly WFL while maintaining precautions)  LLE   LLE :  (grossly WFL while maintaining precautions)  Outcome Measures:  [facility] Basic Mobility  Turning from your back to your side while in a flat bed without using bedrails: A little  Moving from lying on your back to sitting on the side of a flat bed without using bedrails: A little  Moving to and from bed to chair (including a wheelchair): A little  Standing up from a chair using your arms (e.g. wheelchair or bedside chair): A little  To walk in hospital room: A little  Climbing 3-5 steps with railing: A little  Basic Mobility - Total Score: 18    Encounter Problems       Encounter Problems (Active)       Mobility       STG - Patient will ambulate >200' with LRD as needed, indep while maintaining precautions  (Progressing)       Start:  06/09/25    Expected End:  06/23/25            STG - Patient will ascend and descend 3 stairs with HR and SBA to simulate home entry (Progressing)       Start:  06/09/25    Expected End:  06/23/25               Mobility       Pt will indep maintain all post op precautions during functional mobility without therapist cueing  (Progressing)       Start:  06/09/25    Expected End:  06/23/25               PT Transfers       STG - Patient to transfer to and from sit to supine indep with HOB elevated  (Progressing)       Start:  06/09/25    Expected End:  06/23/25            STG - Patient will transfer sit to and from stand indep  (Progressing)       Start:  06/09/25    Expected End:  06/23/25               Pain - Adult              Education Documentation  Precautions, taught by Giselle Mcgowan, PT at 6/9/2025  2:48 PM.  Learner: Patient  Readiness: Acceptance  Method: Explanation  Response: Verbalizes Understanding  Comment: falls prec, post op prec, dc planning    Mobility Training, taught by Giselle Mcgowan, PT at 6/9/2025  2:48 PM.  Learner: Patient  Readiness: Acceptance  Method: Explanation  Response:  Verbalizes Understanding  Comment: falls prec, post op prec, dc planning    Education Comments  No comments found.        06/09/25 at 2:49 PM - Giselle Mcgowan, PT

## 2025-06-10 VITALS
HEIGHT: 63 IN | DIASTOLIC BLOOD PRESSURE: 87 MMHG | OXYGEN SATURATION: 97 % | WEIGHT: 204.37 LBS | RESPIRATION RATE: 16 BRPM | HEART RATE: 79 BPM | TEMPERATURE: 98.7 F | BODY MASS INDEX: 36.21 KG/M2 | SYSTOLIC BLOOD PRESSURE: 133 MMHG

## 2025-06-10 LAB
ANION GAP SERPL CALC-SCNC: 14 MMOL/L (ref 10–20)
BUN SERPL-MCNC: 8 MG/DL (ref 6–23)
CALCIUM SERPL-MCNC: 8.6 MG/DL (ref 8.6–10.6)
CHLORIDE SERPL-SCNC: 111 MMOL/L (ref 98–107)
CO2 SERPL-SCNC: 22 MMOL/L (ref 21–32)
CREAT SERPL-MCNC: 0.47 MG/DL (ref 0.5–1.05)
EGFRCR SERPLBLD CKD-EPI 2021: >90 ML/MIN/1.73M*2
ERYTHROCYTE [DISTWIDTH] IN BLOOD BY AUTOMATED COUNT: 16.4 % (ref 11.5–14.5)
GLUCOSE SERPL-MCNC: 105 MG/DL (ref 74–99)
HCT VFR BLD AUTO: 31 % (ref 36–46)
HGB BLD-MCNC: 9.5 G/DL (ref 12–16)
MCH RBC QN AUTO: 32 PG (ref 26–34)
MCHC RBC AUTO-ENTMCNC: 30.6 G/DL (ref 32–36)
MCV RBC AUTO: 104 FL (ref 80–100)
NRBC BLD-RTO: 0.3 /100 WBCS (ref 0–0)
PLATELET # BLD AUTO: 87 X10*3/UL (ref 150–450)
POTASSIUM SERPL-SCNC: 4.3 MMOL/L (ref 3.5–5.3)
RBC # BLD AUTO: 2.97 X10*6/UL (ref 4–5.2)
SODIUM SERPL-SCNC: 143 MMOL/L (ref 136–145)
WBC # BLD AUTO: 6.1 X10*3/UL (ref 4.4–11.3)

## 2025-06-10 PROCEDURE — 2500000005 HC RX 250 GENERAL PHARMACY W/O HCPCS

## 2025-06-10 PROCEDURE — 99232 SBSQ HOSP IP/OBS MODERATE 35: CPT

## 2025-06-10 PROCEDURE — 36415 COLL VENOUS BLD VENIPUNCTURE: CPT | Performed by: PHYSICIAN ASSISTANT

## 2025-06-10 PROCEDURE — 85027 COMPLETE CBC AUTOMATED: CPT | Performed by: PHYSICIAN ASSISTANT

## 2025-06-10 PROCEDURE — 2500000001 HC RX 250 WO HCPCS SELF ADMINISTERED DRUGS (ALT 637 FOR MEDICARE OP): Performed by: PHYSICIAN ASSISTANT

## 2025-06-10 PROCEDURE — 80048 BASIC METABOLIC PNL TOTAL CA: CPT

## 2025-06-10 PROCEDURE — 36415 COLL VENOUS BLD VENIPUNCTURE: CPT

## 2025-06-10 PROCEDURE — 2500000001 HC RX 250 WO HCPCS SELF ADMINISTERED DRUGS (ALT 637 FOR MEDICARE OP)

## 2025-06-10 RX ORDER — GABAPENTIN 300 MG/1
300 CAPSULE ORAL EVERY 8 HOURS SCHEDULED
Qty: 42 CAPSULE | Refills: 0 | Status: SHIPPED | OUTPATIENT
Start: 2025-06-10 | End: 2025-06-24

## 2025-06-10 RX ORDER — DOCUSATE SODIUM 100 MG/1
100 CAPSULE, LIQUID FILLED ORAL 2 TIMES DAILY PRN
Qty: 28 CAPSULE | Refills: 0 | Status: SHIPPED | OUTPATIENT
Start: 2025-06-10 | End: 2025-06-24

## 2025-06-10 RX ORDER — ACETAMINOPHEN 325 MG/1
975 TABLET ORAL EVERY 8 HOURS SCHEDULED
Qty: 126 TABLET | Refills: 0 | Status: SHIPPED | OUTPATIENT
Start: 2025-06-10 | End: 2025-06-24

## 2025-06-10 RX ORDER — PANTOPRAZOLE SODIUM 40 MG/1
40 TABLET, DELAYED RELEASE ORAL
Qty: 14 TABLET | Refills: 0 | Status: SHIPPED | OUTPATIENT
Start: 2025-06-11 | End: 2025-06-25

## 2025-06-10 RX ORDER — CELECOXIB 100 MG/1
100 CAPSULE ORAL 2 TIMES DAILY
Qty: 28 CAPSULE | Refills: 0 | Status: SHIPPED | OUTPATIENT
Start: 2025-06-10 | End: 2025-06-24

## 2025-06-10 RX ORDER — METHOCARBAMOL 500 MG/1
500 TABLET, FILM COATED ORAL EVERY 6 HOURS SCHEDULED
Qty: 56 TABLET | Refills: 0 | Status: SHIPPED | OUTPATIENT
Start: 2025-06-10 | End: 2025-06-24

## 2025-06-10 RX ORDER — TRAMADOL HYDROCHLORIDE 50 MG/1
50 TABLET, FILM COATED ORAL EVERY 6 HOURS PRN
Qty: 9 TABLET | Refills: 0 | Status: SHIPPED | OUTPATIENT
Start: 2025-06-10

## 2025-06-10 RX ORDER — NAPROXEN SODIUM 220 MG/1
81 TABLET, FILM COATED ORAL DAILY
Qty: 26 TABLET | Refills: 0 | Status: SHIPPED | OUTPATIENT
Start: 2025-06-11 | End: 2025-07-07

## 2025-06-10 RX ORDER — NITROGLYCERIN 20 MG/G
0.5 OINTMENT TOPICAL DAILY
Qty: 30 G | Refills: 0 | Status: SHIPPED | OUTPATIENT
Start: 2025-06-11 | End: 2025-06-18

## 2025-06-10 RX ADMIN — METHOCARBAMOL 500 MG: 500 TABLET ORAL at 00:26

## 2025-06-10 RX ADMIN — ACETAMINOPHEN 975 MG: 325 TABLET ORAL at 08:45

## 2025-06-10 RX ADMIN — GABAPENTIN 300 MG: 300 CAPSULE ORAL at 06:23

## 2025-06-10 RX ADMIN — NITROGLYCERIN 0.5 INCH: 20 OINTMENT TOPICAL at 08:49

## 2025-06-10 RX ADMIN — METHOCARBAMOL 500 MG: 500 TABLET ORAL at 06:23

## 2025-06-10 RX ADMIN — PANTOPRAZOLE SODIUM 40 MG: 40 TABLET, DELAYED RELEASE ORAL at 06:23

## 2025-06-10 RX ADMIN — ASPIRIN 81 MG: 81 TABLET, CHEWABLE ORAL at 08:45

## 2025-06-10 RX ADMIN — METHOCARBAMOL 500 MG: 500 TABLET ORAL at 12:22

## 2025-06-10 RX ADMIN — CELECOXIB 100 MG: 100 CAPSULE ORAL at 06:23

## 2025-06-10 RX ADMIN — ACETAMINOPHEN 975 MG: 325 TABLET ORAL at 00:26

## 2025-06-10 ASSESSMENT — PAIN SCALES - GENERAL: PAINLEVEL_OUTOF10: 0 - NO PAIN

## 2025-06-10 ASSESSMENT — PAIN - FUNCTIONAL ASSESSMENT: PAIN_FUNCTIONAL_ASSESSMENT: 0-10

## 2025-06-10 NOTE — DISCHARGE SUMMARY
Discharge Diagnosis  Malignant neoplasm of right female breast    Issues Requiring Follow-Up  Follow up with plastic surgery for surgical follow up assessment, possible wound VAC removal, and possible YESSICA drain removal.   Follow up with Surgery oncology on 7/1 to review breast pathology post R breast mastectomy    Test Results Pending At Discharge  Pending Labs       Order Current Status    Blood Gas Lactic Acid, Venous In process    Surgical Pathology Exam In process    Surgical Pathology Exam In process    Surgical Pathology Exam In process            Hospital Course  BRIEF HISTORY:    Yvrose John is a 60 y.o. female with a past medical history of heart murmur, MVP and right breast cancer that metastasized to right axillary lymph node. Right lumpectomy and excision of 18 lymph nodes 11/30/22 with Dr. Maldonado. Following surgery, she completed 29 radiation treatments by March 2023 and 20 chemo treatments by October 2023. Now s/p Right mastectomy with unilateral BULMARO reconstruction with Dr Piedra and Dr Liz on 6/6/2025.    HOSPITAL COURSE:    Patient underwent R mastectomy and unilateral BULMARO reconstruction with Dr. Rowell and Dr. Piedra on 6/6. She remained NPO and on bedrest overnight. Overnight, patient with three reported episodes of diastolic hypotension to the 40s. IV fluids increased from 100 to 125 ml per hour. On POD1, BP dom to 175/86, IV fluids were decreased to 50 ml per hour and then discontinued. BP continued to range from 150s-160s/70s overnight on 6/7. Otherwise Doppler sounds were strong, Vioptix was averaging 75% on 98 signal strength, patient's pain was well-controlled, she began advancing her diet, verma was removed, and patient was ambulating halls. She had yet to have BM on POD1. She endorsed some L hand and arm swelling/numbness that began immediately post-operatively. On POD2, patient tolerated a regular diet, continued ambulating well, had BM, reported improvement to her L hand/arm  numbness and swelling, and BP stabilized in 150s-160s/70s range. POD3 patient Hgb dropped to 8.3 and 1 unit PRBC ordered. Overnight had some R chest wall pressure/fullness and monitored throughout the day. YESSICA drain output trending down slightly. BP stable and pain under control. Up and ambulating hallways    CONSULTATIONS:  - Anesthesia team was consulted for acute pain blocks which were in place for two days and removed 6/8.   - General medicine team consulted on 6/7 PM for HTN episodes, given patient asymptomatic and no previous history of HTN, advised prn PO hydralazine 25 TID for SBP>180, otherwise no initiation of other chronic HTN meds required.     DAY OF DISCHARGE:    On the day of discharge, the patient was seen and evaluated by the Plastic Surgery team and deemed suitable for discharge to home. There were no significant events overnight. Vitals were reviewed and within normal limits. Labs were stable at discharge. On day of discharge the patient was tolerating a diet, pain was controlled on PO pain medication, was ambulating well and voiding spontaneously. The patient was given detailed discharge instructions and were scheduled to follow up as an outpatient. 6/16/25 at 9:30 am with Dr. Liz.     Pertinent Physical Exam At Time of Discharge  Physical Exam:   Constitutional: A&Ox3, calm and cooperative, NAD.  Eyes: EOMI, clear sclera.  Head/Neck: Neck supple, trachea midline.  Cardiovascular: Regular rate.  Respiratory/Thorax: Good symmetric chest expansion, breathing comfortably on NC   Gastrointestinal: Abdomen soft, mildly distended, appropriately tender, no peritoneal signs, +BS x 4, lower transverse abdomen incision with prevena incisional wound vac in place, holding suction at -125 mmHg without alarm, leak, obstruction or malfunction, YESSICA drains x 3: 1 to RLQ, 1 to LLQ, 1 to right breast- all with SS output. Ecchymosis noted to mons pubis area, Umbilicus with Aquacel AG in place, no drainage, covered  with tegaderm.  Extremities: LUE with ecchymosis 2/2 heparin shot, stable  Neurological: A&Ox3, cranial nerves II-XII grossly intact  Psychological: Appropriate mood and behavior.  Skin: Warm and dry, no rashes or lesions. See dedicated flap/surgical site exam.   Flap/surgical sites: BULMARO flap recipient site to Right breast which is appropriate color, warm to touch, soft. Flap well approximated with intact sutures without evidence of bleeding or dehiscence. Vioptix at 63% saturation with 95% signal strength. Flap edges which are intact. Nitrobid applied to /native tissue superior portion of the right breast above the incision line/radiated skin. Mild palpable swelling noted above the most superior incision of flap that is mobile, blanchable, and not painful to touch. There are no signs of infection or skin changes on exam. Ecchymosis and edema stable at this time.    Home Medications     Medication List      CONTINUE taking these medications     Elbow Compression Sleeve misc; Generic drug: arm brace; Lymphedema   Sleeve  and Gauntlet eval and fit 20-30 mmHG for right arm     ASK your doctor about these medications     anastrozole 1 mg tablet; Commonly known as: Arimidex; Take 1 tablet (1   mg total) by mouth once daily.       Outpatient Follow-Up  Future Appointments   Date Time Provider Department Center   6/19/2025 11:00 AM Merlyn Rodriguez PA-C [id number] [facility]   6/23/2025  8:15 AM Cornelius Liz MD [id number] [facility]   7/1/2025 11:00 AM Regi Maldonado MD [id number] [facility]   5/15/2026  1:00 PM Estela Osman, APRN-CNP [id number] [facility]     Reyna Savage PA-C  Plastic and Reconstructive Surgery   [place]  Pager #[phone]  Team phones: [phone]

## 2025-06-10 NOTE — SIGNIFICANT EVENT
"  Department of Plastic and Reconstructive Surgery  PM Flap Check     Patient Name: Yvrose John  MRN: [id number]  Date:  06/07/25     Subjective:  Resting comfortably in bed, feels much better since receiving blood transfusion during the day, not as weak. Ambulated several times in hallways. Tolerating diet, +BM. Denies any fever, chills, night sweats, CP, SOB, palpitations, nausea, vomiting, diarrhea, constipation, dysuria, hematuria, hematochezia, hematemesis, flank pain.     Objective:   /69   Pulse 88   Temp 37.3 °C (99.1 °F)   Resp 18   Ht 1.6 m (5' 2.99\")   Wt 93.1 kg (205 lb 4 oz)   SpO2 97%   BMI 36.37 kg/m²      Physical Exam:   Constitutional: A&Ox3, calm and cooperative, NAD.  Eyes: EOMI, clear sclera.  Head/Neck: Neck supple, trachea midline.  Cardiovascular: Regular rate.  Respiratory/Thorax: Good symmetric chest expansion, breathing comfortably on NC   Gastrointestinal: Abdomen soft, mildly distended, appropriately tender, no peritoneal signs, +BS x 4, lower transverse abdomen incision with prevena incisional wound vac in place, holding suction at -125 mmHg without alarm, leak, obstruction or malfunction, YESSICA drains x 2, 1 to RLQ, 1 to LLQ, RLQ with ss output, LLQ with sanguinous output, ecchymosis noted to mons pubis area, Umbilicus with Aquacel AG in place, no drainage, covered with tegaderm.  Extremities: LUE with ecchymosis 2/2 heparin shot, stable  Neurological: A&Ox3, cranial nerves II-XII grossly intact  Psychological: Appropriate mood and behavior.  Skin: Warm and dry, no rashes or lesions. See dedicated flap/surgical site exam.   Flap/surgical sites: BULMARO flap recipient site to Right breast which is appropriate color, warm to touch, soft. Flap well approximated with intact sutures without evidence of bleeding or dehiscence. Biphasic doppler pulse at marked site. Vioptix at 63% saturation with 94% signal strength. Flap edges which are intact. Nitrobid applied to superior " portion of the right breast above the incision line/radiated skin. Mild palpable swelling noted above the most superior incision of flap that is mobile and not painful to touch. There are no signs of infection or skin changes on exam. Ecchymosis and edema stable at this time.    Plan/Recommendations  # S/p Right Mastectomy with BULMARO Reconstruction    - Flap check stable, continue Q4 flap checks per nursing with interim checks per plastics  - all other care per daily progress note  - Updated YASH Presley-CNP  Plastic and Reconstructive Surgery   Available via [employer]  Pager #[phone]  Team phone: [phone]

## 2025-06-10 NOTE — PROGRESS NOTES
Subjective :  Patient ID: Yvrose John is a 60 y.o. female presenting for a post operative visit from surgery on 6/6/25    History of Present Illness: History of right breast cancer, T2 N1 M0 invasive ductal carcinoma, metastasized to right axillary lymph node. Right lumpectomy with removal of 18 lymph nodes 11/30/22 with Dr. Maldonado. She had 29 radiation treatments completed March 2023 and 20 chemo treatments completed October 2023.  She presents today post operatively form surgery on 6/6/25 for a right mastectomy by Dr. Maldonado and Right BULMARO breast reconstruction with Dr. Liz.     Review of Systems  ROS: All 10 systems were reviewed and are unremarkable except for those mentioned in HPI.     Objective :  Physical Exam  Vitals and nursing note reviewed. Exam conducted with a chaperone present.   Constitutional:       General: She is not in acute distress.     Appearance: She is not ill-appearing.   Eyes:      Extraocular Movements: Extraocular movements intact.      Conjunctiva/sclera: Conjunctivae normal.      Pupils: Pupils are equal, round, and reactive to light.   Cardiovascular:      Rate and Rhythm: Normal rate and regular rhythm.      Pulses: Normal pulses.   Pulmonary:      Effort: Pulmonary effort is normal.      Breath sounds: Normal breath sounds.   Abdominal:      Palpations: Abdomen is soft. There is no mass.      Tenderness: There is no abdominal tenderness.      Hernia: No hernia is present.   Musculoskeletal:         General: No swelling or tenderness.      Cervical back: Normal range of motion and neck supple.   Skin:     Capillary Refill: Capillary refill takes less than 2 seconds.      Coloration: Skin is not jaundiced.      Findings: No bruising or rash.   Neurological:      General: No focal deficit present.      Mental Status: She is oriented to person, place, and time.   Psychiatric:         Mood and Affect: Mood normal.         Behavior: Behavior normal.         Thought Content: Thought  content normal.         Judgment: Judgment normal.         Assessment/Plan :    Activity:   -Light Activity as tolerated.  -No pushing, pulling, or lifting objects greater than 5 pounds for at least 8 weeks.  -Okay to shower. Avoid scrubbing of incision sites.     Surgical Site/Wound Care:   -Wound care: Breast incision lines can be left open to air. Abdominal wound vac removed in-office today and can now be left open to air.   -Wear abdominal binder for majority of day as tolerated. Ensure binder sits below the left breast to avoid compression.  -Avoid application of creams, lotions or ointments to surgical site. No excessive scrubbing of surgical site. Okay to shower.  - YESSICA Drains x2 removed in office today. Drain sites were cleaned with betadine and dressed with antibiotic ointment and bandage. Monitor drain sites for any green/yellow/purulent drainage, foul odor, erythema, or edema. Follow up sooner if any signs of infection develop.    Flap care:   -Continue to monitor flap for changes in general appearance, color, temperature and turgor.   -Please additionally monitor for any developing signs of infection which may include increased redness, swelling, fever/chills, green/yellow drainage, or foul odor from surgical sites or wounds.   -If any signs of infection or changes in flap appearance are to occur, please immediately contact the plastic surgery office.    air. Abdominal wound vac removed in-office today and can now be left open to air.   - Apply bacitracin to umbilicus; can cover with ABD   - Apply nitrobid to areas of redness (outer side of right breast (area to the left of incisions/recipient skin) and area above incision on right breast).  - Continue wearing surgical compression bra.   -Wear abdominal binder for majority of day as tolerated. Ensure binder sits below the left breast to avoid compression.  -Avoid application of creams, lotions or ointments to surgical site. No excessive scrubbing of surgical site. Okay to shower.  - YESSICA Drains x2 removed in office today. Drain sites were cleaned with betadine and dressed with antibiotic ointment and bandage. Monitor drain sites for any green/yellow/purulent drainage, foul odor, erythema, or edema. Follow up sooner if any signs of infection develop.  - Prescribed bactrim BID for 2 weeks due to redness and local warm at the inferior aspect of the breast.    Flap care:   -Continue to monitor flap for changes in general appearance, color, temperature and turgor.   -Please additionally monitor for any developing signs of infection which may include increased redness, swelling, fever/chills, green/yellow drainage, or foul odor from surgical sites or wounds.   -If any signs of infection or changes in flap appearance are to occur, please immediately contact the plastic surgery office.     Pain:   Well controlled with current pain regime-  tylenol scheduled  Daily 81 mg ASA  500 mg Robaxin schedule  100 mg Celebrex bID  300 mg Gabapentin  start to wean off 300 BID over th weekend)   Since not using tramadol, please dispose at local pharmacy.    Continue to hold anastrozole.       Follow up in 1 week.

## 2025-06-12 ENCOUNTER — APPOINTMENT (OUTPATIENT)
Dept: PLASTIC SURGERY | Facility: HOSPITAL | Age: 60
End: 2025-06-12
Payer: COMMERCIAL

## 2025-06-13 ENCOUNTER — APPOINTMENT (OUTPATIENT)
Dept: RADIOLOGY | Facility: CLINIC | Age: 60
End: 2025-06-13
Payer: COMMERCIAL

## 2025-06-13 ENCOUNTER — APPOINTMENT (OUTPATIENT)
Dept: SURGICAL ONCOLOGY | Facility: CLINIC | Age: 60
End: 2025-06-13
Payer: COMMERCIAL

## 2025-06-14 NOTE — OP NOTE
RECONSTRUCTION, BREAST, USING FREE FLAP (R), PREPARATION, RECIPIENT SITE, BY OPEN WOUND EXCISION, INITIAL 100 SQ CM OR LESS (R), BIOPSY, LYMPH NODE, MAMMARY (R) Operative Note     Date: 2025  OR Location: [facility] OR    Name: Yvrose John, : 1965, Age: 60 y.o., MRN: [id number], Sex: female    Diagnosis  Pre-op Diagnosis      * Lymphedema of breast [I89.0]     * Acquired breast deformity [N64.89]     * Breast pain [N64.4] Post-op Diagnosis     * Lymphedema of breast [I89.0]     * Acquired breast deformity [N64.89]     * Breast pain [N64.4]     Procedures  RECONSTRUCTION, BREAST, USING FREE FLAP  31135 - NV BREAST RECONSTRUCTION W/FREE FLAP    MASTECTOMY, UNILATERAL, RIGHT BREAST  09845 - NV MASTECTOMY SIMPLE COMPLETE    PREPARATION, RECIPIENT SITE, BY OPEN WOUND EXCISION, INITIAL 100 SQ CM OR LESS  87988 - NV PREP SITE TRUNK/ARM/LEG 1ST 100 SQ CM/1PCT    BIOPSY, LYMPH NODE, MAMMARY  65883 - [place] BX/EXC LYMPH NODE OPEN INT MAMMARY NODE    Intraoperative assessment of flap perfusion with ICG 06726    Modifer 22 is indicated with BULMARO flap today due to increased procedural service required for the preparation and dissection of multiple  vessels that are under 1 mm in diameter with preservation of motor innervation of the rectus abdominis muscle.  The inclusion of those perforators was necessary for flap perfusion, the dissection is tedious especially when motor nerves are preserved which is very important to minimize donor site morbidity and reduces the risk of abdominal wall weakness and bulge.  Surgeons   Panel 1:     * Cornelius Liz - Primary  Panel 2:     * Regi Maldonado - Primary    Resident/Fellow/Other Assistant:  Surgeons and Role:  Panel 1:     * Nilsa Piedra MD - Assisting     * Bronwyn Dao PA-C - Assisting     * Carlyn Cody MD - Resident - Assisting     * Samina Becerra PA-C - PHYLLIS First Assist    Staff:   Circulator: Andres Serra Person: Zeferino Serra Person:  Marvin  Circulator: Cathy Gomez Scrub: Maranda  Circulator: Taylor  Relief Circulator: Tucker Gomez Scrub: [place]  Circulator: Coco    Anesthesia Staff: Anesthesiologist: Meena Larson MD; Susy Burris MD  CRNA: Vaishali Jeffers APRN-CRNA  C-AA: SERA Wilhelm  Anesthesia Resident: Pamela Solis MD; Carli Parikh MD  SRNA: Ramo Wu RN  Frontline Breaker: YASH Gonzalez-CRNA    Procedure Summary  Anesthesia: General  ASA: III  Estimated Blood Loss: 100mL  Intra-op Medications:   Administrations occurring from 0650 to 1825 on 06/06/25:   Medication Name Total Dose   papaverine injection 180 mg   lidocaine (Xylocaine) 20 mg/mL (2 %) injection 30 mL   heparin (porcine) 25,000 Units in sodium chloride 0.9 % 250 mL irrigation 25,000 Units   acetaminophen (Tylenol) tablet 975 mg 975 mg   aprepitant (Emend) capsule 40 mg 40 mg   heparin (porcine) injection 5,000 Units 5,000 Units   ropivacaine (Naropin) injection 0.5 % 15 mL   acetaminophen (Ofirmev) injection 1,000 mg   albumin human bottle 5% 1,500 mL   calcium chloride 100 mg/mL syringe 0.5 g   ceFAZolin (Ancef) vial 1 g 6 g   dexAMETHasone (Decadron) 4 mg/mL IV Syringe 2 mL 8 mg   ePHEDrine injection 50 mg   esmolol 10 mg/mL 50 mg   fentaNYL (Sublimaze) injection 50 mcg/mL 100 mcg   glycopyrrolate (Robinul) injection 0.2 mg   heparin 5,000 units/mL 5,000 Units   HYDROmorphone (Dilaudid) injection 1 mg/mL 0.25 mg   indocyanine green (IC-Green) injection 25 mg 5 mL   LR bolus Cannot be calculated   lidocaine (Xylocaine) injection 2 % 100 mg   midazolam PF (Versed) injection 1 mg/mL 2 mg   potassium chloride 20 mEq in 100 mL IV premix 20 mEq   propofol (Diprivan) injection 10 mg/mL 781.49 mg   rocuronium (ZeMuron) 50 mg/5 mL injection 340 mg              Anesthesia Record               Intraprocedure I/O Totals          Intake    LR bolus 4800.00 mL    albumin human bottle 5% 2000.00 mL    potassium chloride 20 mEq  in 100 mL IV premix 200.00 mL    Total Intake 7000 mL       Output    Urine 1945 mL    Est. Blood Loss 200 mL    Total Output 2145 mL       Net    Net Volume 4855 mL          Specimen:   ID Type Source Tests Collected by Time   1 : RIGHT BREAST Tissue BREAST MASTECTOMY RIGHT SURGICAL PATHOLOGY EXAM Regi Maldonado MD 6/6/2025 1051   2 : RIGHT INTERNAL MAMMARY LYMPHOID TISSUE Tissue ABSCESS SURGICAL PATHOLOGY EXAM Cornelius Liz MD 6/6/2025 1319   3 : RIGHT BREAST CAPSULE Tissue BREAST CAPSULE RIGHT SURGICAL PATHOLOGY EXAM Cornelius Liz MD 6/6/2025 1326   4 : SUPERIOR MASTECTOMY SKIN Tissue SKIN EXCISION SURGICAL PATHOLOGY EXAM Nilsa Piedra MD 6/6/2025 1753   5 : INFERIOR MASTECTOMY SKIN Tissue SKIN EXCISION SURGICAL PATHOLOGY EXAM Nilsa Piedra MD 6/6/2025 1754                 Drains and/or Catheters:   Closed/Suction Drain Lateral RUQ Bulb 19 Fr. (Active)   Site Description Healing 06/10/25 0852   Dressing Status Clean;Dry 06/10/25 0852   Drainage Appearance Serosanguineous;Clots 06/10/25 0000   Status To bulb suction 06/09/25 2000   Output (mL) 40 mL 06/10/25 1000       Closed/Suction Drain Lateral RLQ Bulb 19 Fr. (Active)   Site Description Healing 06/10/25 0851   Dressing Status Clean;Dry 06/10/25 0851   Drainage Appearance Serosanguineous 06/09/25 2000   Status To bulb suction 06/09/25 2000   Output (mL) 3 mL 06/10/25 1000       Closed/Suction Drain Lateral LLQ Bulb 19 Fr. (Active)   Site Description Bleeding 06/10/25 0851   Dressing Status Clean;Dry 06/10/25 0851   Drainage Appearance Serosanguineous 06/09/25 2000   Status To bulb suction 06/09/25 2000   Output (mL) 0 mL 06/10/25 1000       [REMOVED] Urethral Catheter 16 Fr. (Removed)   Site Assessment Clean;Skin intact 06/06/25 2343   Collection Container Standard drainage bag 06/06/25 2343   Securement Method Securing device (Describe) 06/06/25 2343   Reason for Continuing Urinary Catheterization surgical procedures: urological/gynecological, pelvic oncology,  anal, prolonged surgical procedure 06/07/25 0900   Output (mL) 450 mL 06/07/25 0415       Tourniquet Times:         Implants:  Implants       Type Name Action Serial No.      Mesh DURASORB MONOFILAMENT MESH 6 CM X16 CM Implanted [id number]              Findings: Flap was supplied by 5 perforators. Pedicle length measured 5 cm. Deep IEA to MAEVE: 3 mm to 3 mm. Deep IEV to IMV: 5 mm to 5 mm. Ischemia time: 2 hours.     Indications: Yvrose John is an 60 y.o. female who is having surgery for Lymphedema of breast [I89.0]  Acquired breast deformity [N64.89]  Breast pain [N64.4].  She is indicated today for completion mastectomy of her right breast and simultaneous immediate reconstruction with autologous tissue from the abdomen/Maxine flap.    The patient was seen in the preoperative area. The risks, benefits, complications, treatment options, non-operative alternatives, expected recovery and outcomes were discussed with the patient. The possibilities of reaction to medication, pulmonary aspiration, injury to surrounding structures, bleeding, recurrent infection, the need for additional procedures, failure to diagnose a condition, and creating a complication requiring transfusion or operation were discussed with the patient. The patient concurred with the proposed plan, giving informed consent.  The site of surgery was properly noted/marked if necessary per policy. The patient has been actively warmed in preoperative area. Preoperative antibiotics have been ordered and given within 1 hours of incision. Venous thrombosis prophylaxis have been ordered including bilateral sequential compression devices and subcutaneous heparin before induction.    Procedure Details:   The patient was greeted in the preoperative holding area.  I reviewed the planned procedure with the patient and discussed indications, contraindications, alternatives and possible complications.  I answered the patient's questions to the best of my medical  knowledge and to her satisfaction.  The patient agreed to proceed with surgery.  The patient then consented for surgery.  After that, the patient was marked in upright standing position.  After that, the patient was transferred to operating theater on her bed.  And thorough safety huddle was performed as soon as she entered the operating theater.  All in the room agreed to proceed with surgery.  The patient was then transferred to the operating bed, and there she was placed supine, and she was held secured by safety belt.  Bilateral sequential compression devices were applied to both legs.  All bony prominences were well-padded to avoid pressure sores.  General anesthesia with endotracheal intubation was performed by anesthesia personnel.  Alexander catheter was inserted.  An A-line was inserted anesthesia personnel.  The surgical sites were then prepped with Betadine paint and draped in standard sterile fashion.  Timeout was then performed and all again agreed to proceed with surgery.  She was given antibiotics before incision.  Dr. Maldonado proceeded with right side completion mastectomy.  Please refer to her separately dictated operative note.  Due to the need for right side Maxine flap, I scrubbed then after Dr. Maldonado has completed her procedure.  Dr. Piedra assisted me throughout the surgery.  Her expertise was required due to the absence of qualified plastic surgery resident, and complexity of the procedure.  I proceeded with the preparation of the right chest after mastectomy.  Dr. Maldonado did contracture release during her completion mastectomy, but I felt that the pocket could benefit from additional release especially laterally.  Additionally, there was residual inflamed erythematous tissue at the upper mastectomy skin.  Perfusion of the mastectomy skin was assessed intraoperatively using ICG and hand-held infrared camera/spy and the inflamed area at the upper mastectomy skin was nonviable and therefore it was marked and  excised with great attention to cosmesis and to the placement of the scar to follow the superior bra line, the excised tissue was sent for histopathology and measured roughly 15 x 3 cm.  After that, with the aid of electrocautery I did additional tissue release and scoring to release the contracture at the lateral chest and advance the lateral chest skin flap medially which was then held in place with buried deep sutures with size 2/0 Vicryl anchoring the fascial component of the lateral chest flap to the serratus anterior fascia.,  The pectoralis major muscle fibers over the third rib were then incised to expose the cartilaginous section of the third rib.  1.5 cm of the cartilaginous section was excised leaving the posterior perichondrium intact which was then incised open to expose the internal mammary artery and vein.  Enlarged lymph node was encountered and it was carefully dissected with the aid of bipolar and removed and sent for histopathology. The internal mammary artery and vein were then dissected to adequate length, and sidebranches were carefully clipped.  The vessels were then irrigated with lidocaine 2% and were covered with surgical lap soaked in warm saline.    BULMARO flap harvest: Given the extent of mastectomy skin defect and the need to provide skin and volume replacement with proper projection, it was necessary to include zone 1 and 2 of right BULMARO flap and zone 1 of left BULMARO flap.  The location of perforators was marked with the aid of ultrasound Doppler based on CTA findings.  The periumbilical incision was made with 15 blade scalpel and electrocautery down to the Maria De Jesus's fascia to free the umbilicus from the surrounding abdominal tissue.  After that, I proceeded with right Bulmaro flap elevation in supra fascial direction from lateral to medial and from superior to medial towards the marked perforators.  3 small sized superior lateral perforators were identified, and then inferior small-sized  lateral  was identified and finally two medial row perforators also small in size were identified.  After that, perforators from the left abdomen were identified with focus on single  to assess the need of its inclusion to enhance the perfusion of zone one of the left Maxine flap.  All other perforators from right and left abdomen where appropriately clipped and they were found too tiny to be utilized.  Flap perfusion was then assessed in different perforators combination using ICG and hand-held infrared camera/spy.  It was necessary to include all the lateral row perforators from the right side in addition to centrally located single medial row .  The superiorly located right side medial  was unnecessary as well as the left side .  After that, perforators dissection was performed in retrograde fashion from suprafascial to subfascial to intramuscular to submuscular plane until the main deep inferior gastric artery and veins which were then dissected more proximally to the pedicle takeoff.  This was tedious dissection given the number of perforators included, and their different locations on abdomen, and the need to preserve as many of the motor branches to the rectus abdominis muscle as possible to maintain its functionality.  It was also necessary, to partly transect the medial fibers of the rectus abdominis overlying the medial  in order to expose this  and include in the flap.  This was achieved by the aid of J&J linear cutting stapler to facilitate muscle approximation later.  Flap dissection was thus completed and the flap was isolated on its blood supply flap perfusion was then assessed again with ICG and hand-held infrared spy camera in zone 2 of left Maxine flap was excised.  The flap was left to perfuse after vessels irrigation with topical lidocaine 2%.  The flap was then divided and transferred to the chest and there it was temporarily  secured to facilitate microvascular repair.  The deep inferior gastric artery was repaired to the internal mammary artery with no size mismatch in end-to-end fashion using 9/0 micro suture under operating microscope magnification.  In the deep inferior epigastric vein was repaired in similar fashion to the internal mammary vein without size mismatch.  I started with the vein first and total ischemic time was 2 hours.  Flap inset was then finalized and excess skin from the flap was removed and flap was anchored in its place with deep buried 2/0 Vicryl and then repaired to mastectomy skin in 2 layers using 3/0 Monocryl for deep dermis repair and 4/0 Monocryl for subcuticular repair.  After that, I shifted my attention to closure of the abdomen.  First, the right rectus abdominis muscle fibers were approximated with figure of 8 size 0 Vicryl, and due to the transverse transection of part of the medial fibers of the rectus abdominis DuraSorb mesh was applied to reinforce muscle repair.  After that, the fascia was repaired in buried interrupted figure-of-eight Vicryl 0 and then in running locking fashion with size 1 Prolene and overlying DuraSorb mesh was applied to reinforce the fascial closure given it is length to minimize the risk of bulge and weakness.  After that, the table was flexed for 35 degrees and the abdomen was closed in a manner similar to abdominoplasty by advancing the abdominal flap inferiorly and then marking the location of the umbilicus on the upper abdominal flap and then closing the inferior transverse abdominal incision in layers with size 0 PDS for the superficial fascia-deep dermis repair, 2-0 PDS for deep dermal closure and then 3-0 Monocryl for skin closure in subcuticular fashion.  After that, full-thickness window was excised at the new location of the umbilicus, and the umbilicus was delivered through the upper abdominal flap and was repaired to surrounding abdominal skin with 4-0 Monocryl  for the deep dermis repair and then 5-0 Monocryl for subcuticular closure.    The flap incisions were dressed with Dermabond.  The inferior transverse abdominal incision was dressed with incisional wound VAC.  The umbilicus was dressed with Nitro-Bid, Aquacel and Tegaderm.  There are occasional perforators on the breast flap was marked with 5-0 Prolene suture.  ViOptix sensor was applied to the flap and yielded good signals.  Patient was put in surgical bra.  Evidence of Infection: No   Complications:  None; patient tolerated the procedure well.    Disposition: PACU - hemodynamically stable.  Condition: stable       Dr. Piedra assisted me throughout the entire procedure. Her expertise was necessary due to the complex nature of this surgery and lack of qualified resident physician.       Attending Attestation: I performed the procedure.    Cornelius Liz  Phone Number: [phone]

## 2025-06-16 ENCOUNTER — APPOINTMENT (OUTPATIENT)
Dept: PLASTIC SURGERY | Facility: CLINIC | Age: 60
End: 2025-06-16
Payer: COMMERCIAL

## 2025-06-16 VITALS
DIASTOLIC BLOOD PRESSURE: 94 MMHG | HEIGHT: 63 IN | SYSTOLIC BLOOD PRESSURE: 153 MMHG | HEART RATE: 74 BPM | RESPIRATION RATE: 16 BRPM | WEIGHT: 196 LBS | BODY MASS INDEX: 34.73 KG/M2

## 2025-06-16 DIAGNOSIS — Z48.01 ENCOUNTER FOR CHANGE OR REMOVAL OF SURGICAL WOUND DRESSING: ICD-10-CM

## 2025-06-16 DIAGNOSIS — T81.49XA SURGICAL SITE INFECTION: Primary | ICD-10-CM

## 2025-06-16 PROCEDURE — 3008F BODY MASS INDEX DOCD: CPT

## 2025-06-16 PROCEDURE — 99024 POSTOP FOLLOW-UP VISIT: CPT

## 2025-06-16 RX ORDER — BACITRACIN 500 [USP'U]/G
OINTMENT TOPICAL
Qty: 14 G | Refills: 0 | Status: SHIPPED | OUTPATIENT
Start: 2025-06-16 | End: 2026-06-16

## 2025-06-16 RX ORDER — SULFAMETHOXAZOLE AND TRIMETHOPRIM 800; 160 MG/1; MG/1
1 TABLET ORAL 2 TIMES DAILY
Qty: 28 TABLET | Refills: 0 | Status: SHIPPED | OUTPATIENT
Start: 2025-06-16 | End: 2025-06-30

## 2025-06-16 ASSESSMENT — PAIN SCALES - GENERAL: PAINLEVEL_OUTOF10: 0-NO PAIN

## 2025-06-17 ENCOUNTER — TELEPHONE (OUTPATIENT)
Dept: PLASTIC SURGERY | Facility: CLINIC | Age: 60
End: 2025-06-17
Payer: COMMERCIAL

## 2025-06-17 NOTE — PROGRESS NOTES
Division of Plastic and Reconstructive Surgery  Clinic Visit Note     Subjective   Patient ID: Yvrose John is a 60 y.o. female presenting for a post operative visit from surgery on 6/6/25.    History of Present Illness: History of right breast cancer, T2 N1 M0 invasive ductal carcinoma, metastasized to right axillary lymph node. Right lumpectomy with removal of 18 lymph nodes 11/30/22 with Dr. Maldonado. She had 29 radiation treatments completed March 2023 and 20 chemo treatments completed October 2023.  She presents today postoperatively from surgery on 6/6/25 for a right mastectomy by breast surgery (Dr. Maldonado) and right breast BULMARO breast reconstruction with plastic surgery (Dr. Liz).     Pt states she has 0/10 pain with current pain regimen of tylenol scheduled, Celebrex, Robaxin, and Gabapentin. States that she would like to attempt to wean use of some of these medications if she can and since her pain has been well controlled. She is taking Bactrim as directed for the past week. Approximately 4-5 days ago, she endorses noticing some increased fullness laterally at the soft tissue adjacent to her right breast plus a rash which extends laterally from the top aspect of her right breast and upwards into her pre-axillary region and proximal right arm. She denies any new exposures or meds aside from what she has been prescribed. Pt has tracked 1 YESSICA output on chart. The drain has had approximately 25 ml of SS output over the past 2 days. Denies fever, chills, headache, dizziness, chest pain, palpitations, dyspnea, abdominal pain, nausea or vomiting.    Review of Systems  ROS: All 10 systems were reviewed and are unremarkable except for those mentioned in HPI.     Objective   Vitals:    06/23/25 0849   BP: 106/74   Resp: 16     Physical Exam  Vitals and nursing note reviewed. Exam conducted with a chaperone present.   Constitutional:       General: She is not in acute distress.     Appearance: She is not  ill-appearing.   Eyes:      Extraocular Movements: Extraocular movements intact.      Conjunctiva/sclera: Conjunctivae normal.      Pupils: Pupils are equal, round, and reactive to light.   Cardiovascular:      Rate and Rhythm: Normal rate and regular rhythm.      Pulses: Normal pulses.   Pulmonary:      Effort: Pulmonary effort is normal.      Breath sounds: Normal breath sounds.   Abdominal:       Umbilicus healing with some residual dark brown to black scabbed, eschar material which is adherent to surgical incision line, site otherwise GRIS. No surrounding erythema. No fluctuance, induration, or drainage at this time. Lower transverse abdominal incision line healed with some scattered scabbed regions. No thompson-incisional erythema, induration, dehiscence or drainage. YESSICA drain x1 to right lower abdominal wall, removed on exam. Prior YESSICA drain insertion site at left lower abdominal wall now healed closed, no active drainage.      Palpations: Abdomen is soft. There is no mass.      Tenderness: There is no abdominal tenderness.      Hernia: No hernia is present.   Musculoskeletal:         General: No swelling or tenderness.      Cervical back: Normal range of motion and neck supple.   Skin:     Capillary Refill: Capillary refill takes less than 2 seconds.      Coloration: Skin is not jaundiced.      Findings: No bruising or rash.   Neurological:      General: No focal deficit present.      Mental Status: She is oriented to person, place, and time.   Psychiatric:         Mood and Affect: Mood normal.         Behavior: Behavior normal.         Thought Content: Thought content normal.         Judgment: Judgment normal.     Focused exam of the right breast: Free flap recipient site intact at generalized aspect of right breast.  Flap is warm, soft, compressible, appropriate coloration.  Flap incisions are well-approximated and healing with some residual spots of Dermabond.  There is chronic and stable darkish red tissue  discoloration to the superior mastectomy tissue of the right breast which appears consistent with prior exams.  There is new tissue induration which is nontender to palpation at the lateral aspect of the right breast at the right inferior axillary line.  There is no fluctuance to suggest fluid accumulation at this region.  No overlying tissue erythema at this site.  There is a region of rash/skin eruption spanning from the superior mastectomy tissue of the right breast laterally to the right axillary region and proximal aspect of the anterior right upper arm.  Patient notes pruritus at this site.    Assessment/Plan   Patient presents for a postoperative visit status post completion mastectomy with right breast and reconstruction with autologous tissue Maxine flap.  Patient continues to progress appropriately postoperatively.  She did have some newly noted induration at the lateral aspect of the right reconstructed breast at the negative tissue was considered likely secondary to intraoperative suture placement at this site.  There is consistent reddened coloration of the mastectomy tissue superiorly at the right breast which is consistent with patient's history of prior radiation skin changes at this site.  No active concerns for infection at this time.  Patient does have a new rash/nonspecific skin eruption spanning the right upper breast and into the proximal right upper arm which is considered likely secondary to irritation versus reaction.    Plan:  - YESSICA Drains x1 removed in office today. No drains remain. Drain sites was cleansed with betadine and dressed with antibiotic ointment and bandage. Monitor drain sites for any green/yellow/purulent drainage, foul odor, erythema, or edema. Follow up sooner if any signs of infection develop.  - Prescribed bactrim BID for 2 weeks due to redness and local warm at the inferior aspect of the breast on 6/16, OK to DC Bactrim at this time. No active infection concerns at this  time.   - She was advised to continue light activity as tolerated. No pushing, pulling, or lifting objects greater than 5 pounds for at least 8 weeks postoperatively. Not clear to drive until re-assessed at next FUV.   - She is OK to shower but should avoid scrubbing of incision sites, no prolonged submersion.   - DC use of Nitrobid paste. Start application of prescribed Hydrocortisone cream once daily in AM and application of prescribed topical benadryl cream once daily in the PM as needed for pruritus and rash at right breast.   - Breast, abdominal and umbilical incision lines can otherwise be left open to air, no creams or dressings required.   - Continue wearing surgical compression bra and abdominal binder for majority of day as tolerated. Ensure binder sits below the left breast to avoid compression.  - Please additionally monitor for any developing signs of infection which may include increased redness, swelling, fever/chills, green/yellow drainage, or foul odor from surgical sites or wounds.   - If any signs of infection or changes in flap appearance are to occur, please immediately contact the plastic surgery office.   - OK to de-escalate medication regimen: Continue low dose ASA once daily, PO Tylenol q8h PRN. May DC Celebrex, Robaxin completely and utilize on as needed basis moving forward.  De-escalate p.o. gabapentin to 300 mg once per day in the next few days then may completely discontinue use.   - Continue to hold anastrozole, follow up resuming at next visit in 2 weeks.     Shell Desouza PA-C  Plastic and Reconstructive Surgery

## 2025-06-17 NOTE — TELEPHONE ENCOUNTER
Spoke with patient, she is leaking serosanguinous fluid from her one YESSICA abdominal drain site after removal in clinic 6/16/25. She states she changed her pad once over night and the fluid increases with activity. Let her know to monitor for signs of seroma and infection. She will continue to give us daily updates.

## 2025-06-19 ENCOUNTER — OFFICE VISIT (OUTPATIENT)
Dept: HEMATOLOGY/ONCOLOGY | Facility: CLINIC | Age: 60
End: 2025-06-19
Payer: COMMERCIAL

## 2025-06-19 VITALS
SYSTOLIC BLOOD PRESSURE: 137 MMHG | BODY MASS INDEX: 34.21 KG/M2 | HEART RATE: 73 BPM | WEIGHT: 193.12 LBS | DIASTOLIC BLOOD PRESSURE: 83 MMHG | OXYGEN SATURATION: 99 % | RESPIRATION RATE: 18 BRPM | TEMPERATURE: 97.3 F

## 2025-06-19 DIAGNOSIS — Z78.0 MENOPAUSE: Primary | ICD-10-CM

## 2025-06-19 DIAGNOSIS — C77.3 BREAST CANCER METASTASIZED TO AXILLARY LYMPH NODE, RIGHT: ICD-10-CM

## 2025-06-19 DIAGNOSIS — C50.911 BREAST CANCER METASTASIZED TO AXILLARY LYMPH NODE, RIGHT: ICD-10-CM

## 2025-06-19 PROCEDURE — 99215 OFFICE O/P EST HI 40 MIN: CPT

## 2025-06-19 ASSESSMENT — PAIN SCALES - GENERAL: PAINLEVEL_OUTOF10: 1

## 2025-06-19 NOTE — PROGRESS NOTES
Patient here with her  for follow up with Merlyn CORONA. Medications and allergies reviewed with patient.     Patient reports that she is continuing to heal after surgery. She reports some skin irritation from the bandages but otherwise states she is doing well. Anastrozole is on hold for now.  She denies, nausea, vomiting, diarrhea, constipation, difficulty eating or weight loss.    Per orders patient to follow up in 6 months. DEXA scan is due in August. She will follow up with Surgeon regarding when to restart anastrozole.     Patient verbalized understanding using the teach back method, no further questions at this time.

## 2025-06-19 NOTE — PROGRESS NOTES
DIVISION OF MEDICAL ONCOLOGY    Patient ID: Yvrose John is a 60 y.o. female.  MRN: [id number]  : 1965  The patient presents to clinic today for her history of right breast cancer.     Cancer Staging   Breast cancer metastasized to axillary lymph node, right  Staging form: Breast, AJCC 8th Edition  - Pathologic stage from 2022: No Stage Recommended - Signed by Danny Chowdary MD on 10/28/2023  pT1c  pN2a  cM0  Andrea grade: G2  ER Status: Positive  ME Status: Positive  HER2 Status: Positive    Diagnostic/Therapeutic History:  - Noted pain, rash right breast.  - 2022: Skin biopsy showed eosinophilic spongiotic dermatitis. Mammogram and ultrasound showed a 1.4 cm right breast mass with multiple abnormal right axillary LN's.  - 6/3/22: US-guided biopsy of the right breast mass showed IDC, grade 2-3, ER >95% ME 40%, Her2+ (3+ IHC). An axillary LN was positive for metastatic carcinoma.  - 22: PET/CT did not showed any evidence of distant metastatic disease, but did show hypermetabolic activity in the 2.5 cm right breast mass and ALN's (cT2N1).  - Initiated on TCHP 22. She developed papilledema that was attributed to the carboplatin, so this was removed from cycle 2-6. Completed cycle #6 of THP on 10/17/22.  - HP initiated while awaiting surgery.  - Right lumpectomy/ALND performed on 22. 1.4 cm of residual grade 2 breast tumor noted with 4 LN's positive for carcinoma (2 macrometastases; 2 micrometastases). No GALI noted, but tumor deposits present in perinodal soft tissue. +LVI. Margins negative.  ypT1c ypN2a.  - Initial plan was for immediate reconstruction, but ultimately decided to purse adjuvant RT.  - Switched to adjuvant TDM1 23. Completed 14 cycles 10/2023.  - Adjuvant RT 23-3/16/23.  - 3/2023: Tamoxifen initiated, switched to anastrozole after a few months once postmenopausal status was confirmed.  -25 for a right mastectomy by Dr. Maldonado and Right BULMARO breast  reconstruction with Dr. Liz     History of Present Illness (HPI)/Interval History:  Yvrose John presents today for routine FUV on anastrozole. Has been off since surgery. Will restart once cleared by plastics.     Overall, she is doing well since her last visit. She underwent a right mastectomy with BULMARO breast reconstruction. Recovering well.     She denies any new breast cancer concerns.     She denies any chest pain or breathing issues.     She denies any vision changes or headache issues, dizziness, loss of balance or falls.     She denies any new or unexplained bone aches or pains.    She denies any skin lesions or masses.    She reports a normal appetite. Some abdominal discomfort 2/2 surgery, tolerable with tylenol.     She denies any issues with sleep, fatigue, or mood swings. Mild hot flashes.     Review of Systems:  14-point ROS otherwise negative, as per HPI/Interval History.    Past Medical History:   Diagnosis Date    Breast cancer 06/2022    Changes in skin texture 05/31/2022    Breast skin changes    Heart murmur     History of mitral valve prolapse     History of rheumatic fever     Hx antineoplastic chemo     Personal history of irradiation     Personal history of other specified conditions 05/31/2022    History of lump of right breast    Rheumatic fever      Patient Active Problem List   Diagnosis    Anemia    Breast cancer metastasized to axillary lymph node, right    Chest pain on exertion    Contact dermatitis due to poison ivy    Hot flashes due to menopause    Thyroid fullness    Palpitations    Lymphedema    Hypokalemia    COVID-19    S/P radiation therapy    History of breast cancer    Pre-op exam    Exposure to other ionizing radiation, sequela    Breast deformity    Encounter for change or removal of surgical wound dressing    Lymphedema of breast    Acquired breast deformity    Breast pain    Malignant neoplasm of right female breast    Contour deformity of breast, acquired     Acute respiratory failure with hypoxia    Immunodeficiency, unspecified    Surgical site infection        Past Surgical History:   Procedure Laterality Date    BREAST LUMPECTOMY Right 2022    right lumpectomy w/ chemo and rad    DILATION AND CURETTAGE OF UTERUS  2013    Dilation And Curettage    HYSTEROSCOPY  2013    Hysteroscopy With Endometrial Ablation    IR CVC PORT REMOVAL  11/15/2023    IR CVC PORT REMOVAL 11/15/2023 Shawn Shahid MD [facility]    IR CVC PORT REMOVAL  11/15/2023    IR CVC PORT REMOVAL    MASTECTOMY COMPLETE / SIMPLE Right 2025    Procedure: MASTECTOMY, UNILATERAL, RIGHT BREAST;  Surgeon: Regi Maldonado MD;  Location: [facility];  Service: General Surgery Breast Oncology;  Laterality: Right;    OTHER SURGICAL HISTORY  2013    Laparoscopic Excision Left Fallopian Tube Cyst (___ Cm)    TOTAL ABDOMINAL HYSTERECTOMY  2013    Total Abdominal Hysterectomy    TUBAL LIGATION  2013    Tubal Ligation       Social History     Socioeconomic History    Marital status:    Tobacco Use    Smoking status: Former     Current packs/day: 0.00     Types: Cigarettes     Quit date:      Years since quittin.     Passive exposure: Never    Smokeless tobacco: Never   Vaping Use    Vaping status: Never Used   Substance and Sexual Activity    Alcohol use: Not Currently     Alcohol/week: 2.0 standard drinks of alcohol     Types: 2 Glasses of wine per week     Comment: social    Drug use: Never    Sexual activity: Defer     Social Drivers of Health     Financial Resource Strain: Low Risk  (2025)    Overall Financial Resource Strain (CARDIA)     Difficulty of Paying Living Expenses: Not very hard   Food Insecurity: No Food Insecurity (2025)    Hunger Vital Sign     Worried About Running Out of Food in the Last Year: Never true     Ran Out of Food in the Last Year: Never true   Transportation Needs: No Transportation Needs (2025)    PRAPARE -  Transportation     Lack of Transportation (Medical): No     Lack of Transportation (Non-Medical): No   Physical Activity: Sufficiently Active (6/13/2023)    Received from [facility]    Exercise Vital Sign     Days of Exercise per Week: 7 days     Minutes of Exercise per Session: 30 min   Stress: No Stress Concern Present (6/13/2023)    Received from [facility]    [place] of Occupational Health - Occupational Stress Questionnaire     Feeling of Stress : Only a little   Social Connections: Moderately Isolated (6/13/2023)    Received from [facility]    Social Connection and Isolation Panel [NHANES]     Frequency of Communication with Friends and Family: Three times a week     Frequency of Social Gatherings with Friends and Family: Once a week     Attends Tenriism Services: Never     Active Member of Clubs or Organizations: No     Attends Club or Organization Meetings: Never     Marital Status:    Intimate Partner Violence: Not At Risk (6/8/2025)    Humiliation, Afraid, Rape, and Kick questionnaire     Fear of Current or Ex-Partner: No     Emotionally Abused: No     Physically Abused: No     Sexually Abused: No   Housing Stability: Low Risk  (6/8/2025)    Housing Stability Vital Sign     Unable to Pay for Housing in the Last Year: No     Number of Times Moved in the Last Year: 0     Homeless in the Last Year: No       Family History   Problem Relation Name Age of Onset    Hypertension Mother      Diabetes type II Father      Prostate cancer Father      Breast cancer Sister         Allergies:   No Known Allergies      Current Outpatient Medications:     acetaminophen (Tylenol) 325 mg tablet, Take 3 tablets (975 mg) by mouth every 8 hours for 14 days., Disp: 126 tablet, Rfl: 0    [Paused] anastrozole (Arimidex) 1 mg tablet, Take 1 tablet (1 mg total) by mouth once daily., Disp: 90 tablet, Rfl: 3    arm brace (Elbow Compression Sleeve) misc, Lymphedema Sleeve  and Gauntlet eval and fit  20-30 mmHG for right arm, Disp: 1 each, Rfl: 0    aspirin 81 mg chewable tablet, Chew 1 tablet (81 mg) once daily for 26 days., Disp: 26 tablet, Rfl: 0    bacitracin 500 unit/gram ointment, Apply to affected area daily, Disp: 14 g, Rfl: 0    celecoxib (CeleBREX) 100 mg capsule, Take 1 capsule (100 mg) by mouth 2 times a day for 14 days., Disp: 28 capsule, Rfl: 0    docusate sodium (Colace) 100 mg capsule, Take 1 capsule (100 mg) by mouth 2 times a day as needed for constipation for up to 14 days., Disp: 28 capsule, Rfl: 0    gabapentin (Neurontin) 300 mg capsule, Take 1 capsule (300 mg) by mouth every 8 hours for 14 days., Disp: 42 capsule, Rfl: 0    methocarbamol (Robaxin) 500 mg tablet, Take 1 tablet (500 mg) by mouth every 6 hours for 14 days., Disp: 56 tablet, Rfl: 0    nitroglycerin (Robert-Bid) 2 % ointment, Place 0.5 inches on the skin once daily for 7 days., Disp: 30 g, Rfl: 0    pantoprazole (ProtoNix) 40 mg EC tablet, Take 1 tablet (40 mg) by mouth once daily in the morning. Take before meals for 14 days. Do not crush, chew, or split. (Patient not taking: Reported on 2025), Disp: 14 tablet, Rfl: 0    sulfamethoxazole-trimethoprim (Bactrim DS) 800-160 mg tablet, Take 1 tablet by mouth 2 times a day for 14 days., Disp: 28 tablet, Rfl: 0    traMADol (Ultram) 50 mg tablet, Take 1 tablet (50 mg) by mouth every 6 hours if needed for severe pain (7 - 10). (Patient not taking: Reported on 2025), Disp: 9 tablet, Rfl: 0     Objective    BSA: 1.97 meters squared  /83 (BP Location: Left arm, Patient Position: Sitting, BP Cuff Size: Adult long)   Pulse 73   Temp 36.3 °C (97.3 °F) (Temporal)   Resp 18   Wt 87.6 kg (193 lb 2 oz)   SpO2 99%   BMI 34.21 kg/m²   Wt Readings from Last 3 Encounters:   25 87.6 kg (193 lb 2 oz)   25 88.9 kg (196 lb)   06/10/25 92.7 kg (204 lb 5.9 oz)     ECOG Performance Status:  The ECOG performance scale today is ECO- Restricted in physically strenuous  activity.  Carries out light duty.    Physical Exam  Vitals and nursing note reviewed.   Constitutional:       General: She is awake. She is not in acute distress.     Appearance: Normal appearance. She is not ill-appearing.   HENT:      Head: Normocephalic and atraumatic.   Eyes:      General: No scleral icterus.        Right eye: No discharge.         Left eye: No discharge.   Neck:      Trachea: Trachea and phonation normal. No tracheal tenderness.   Cardiovascular:      Rate and Rhythm: Normal rate and regular rhythm.      Heart sounds: No murmur heard.     No friction rub. No gallop.   Pulmonary:      Effort: Pulmonary effort is normal. No respiratory distress.      Breath sounds: Normal breath sounds. No wheezing.   Chest:   Breasts:     Right: Absent.      Left: No swelling, mass, nipple discharge, skin change or tenderness.          Comments: S/p right mastectomy with BULMARO reconstruction with right breast drain in place. Scar healing well   Abdominal:      Comments: Transabdominal scar healing well C/D/I    Musculoskeletal:         General: No tenderness or deformity. Normal range of motion.      Cervical back: Normal range of motion and neck supple. No rigidity or tenderness.      Right lower leg: No edema.      Left lower leg: No edema.   Lymphadenopathy:      Cervical: No cervical adenopathy.      Upper Body:      Right upper body: No supraclavicular, axillary or pectoral adenopathy.      Left upper body: No supraclavicular, axillary or pectoral adenopathy.   Skin:     General: Skin is warm and dry.      Coloration: Skin is not jaundiced.      Findings: No lesion or rash.   Neurological:      General: No focal deficit present.      Mental Status: She is alert and oriented to person, place, and time. Mental status is at baseline.      Gait: Gait normal.   Psychiatric:         Mood and Affect: Mood normal.         Behavior: Behavior normal.         Thought Content: Thought content normal.         Judgment:  Judgment normal.         Laboratory Data:  Lab Results   Component Value Date    WBC 6.1 06/10/2025    HGB 9.5 (L) 06/10/2025    HCT 31.0 (L) 06/10/2025     (H) 06/10/2025    PLT 87 (L) 06/10/2025       Chemistry    Lab Results   Component Value Date/Time     06/10/2025 0620     05/20/2025 1118    K 4.3 06/10/2025 0620    K 3.8 05/20/2025 1118     (H) 06/10/2025 0620     05/20/2025 1118    CO2 22 06/10/2025 0620    CO2 22 05/20/2025 1118    BUN 8 06/10/2025 0620    BUN 14 05/20/2025 1118    CREATININE 0.47 (L) 06/10/2025 0620    CREATININE 0.66 05/20/2025 1118    Lab Results   Component Value Date/Time    CALCIUM 8.6 06/10/2025 0620    CALCIUM 9.8 05/20/2025 1118    ALKPHOS 109 05/20/2025 1118    AST 49 (H) 05/20/2025 1118    ALT 56 (H) 05/20/2025 1118    BILITOT 0.7 05/20/2025 1118        Radiology:  === 05/18/24 ===    BI MR BREAST BILATERAL WITH CONTRAST FULL PROTOCOL    - Impression -  Posttreatment changes of the right breast. No MRI evidence of  malignancy in either breast.  Clinical follow-up is recommended.  Patient will be due for annual bilateral screening mammogram 06/2024.    BI-RADS CATEGORY:  BI-RADS Category:  2 Benign.  Recommendation:  Annual Screening.  Recommended Date:  1 Year.  Laterality:  Bilateral.    For any future breast imaging appointments, please call [phone] ([phone]).      MACRO:  None    Signed by: Blessing Dewey 5/21/2024 12:29 PM  Dictation workstation:   [id number]      Assessment/Plan:  Yvrose John is a 60 y.o. female with a history of right ER+/Her2+ breast cancer, who presents today for follow-up evaluation on adjuvant anastrozole.    Breast cancer metastasized to axillary lymph node, right (CMS/HCC)  - Continue anastrozole 1 mg daily (will restart once cleared from plastics)   - Grade 1 hot flashes and arthralgias. Stable.  - MRI and mammogram recently without concerns for malignancy. Tentatively plan for next mammogram on the left -  discuss with Dr. Maldonado at next visit   - DEXA 7/2023 normal. Continue q2 year screening.  - Follow up with breast and plastic surgery in the coming weeks     Disposition.  - RTC 6 months with Merlyn Rodriguez PA-C   - She has our contact information and was instructed to call with concerns/questions in the interim.    No orders of the defined types were placed in this encounter.     Merlyn Rodriguez PA-C  Hematology and Medical Oncology  [facility]

## 2025-06-23 ENCOUNTER — APPOINTMENT (OUTPATIENT)
Dept: PLASTIC SURGERY | Facility: CLINIC | Age: 60
End: 2025-06-23
Payer: COMMERCIAL

## 2025-06-23 ENCOUNTER — OFFICE VISIT (OUTPATIENT)
Dept: PLASTIC SURGERY | Facility: CLINIC | Age: 60
End: 2025-06-23
Payer: COMMERCIAL

## 2025-06-23 VITALS
RESPIRATION RATE: 16 BRPM | SYSTOLIC BLOOD PRESSURE: 106 MMHG | WEIGHT: 193 LBS | HEIGHT: 63 IN | DIASTOLIC BLOOD PRESSURE: 74 MMHG | BODY MASS INDEX: 34.2 KG/M2

## 2025-06-23 DIAGNOSIS — Z98.890 STATUS POST RECONSTRUCTION PROCEDURE: Primary | ICD-10-CM

## 2025-06-23 DIAGNOSIS — Z48.89 ENCOUNTER FOR POST SURGICAL WOUND CHECK: ICD-10-CM

## 2025-06-23 DIAGNOSIS — Z48.01 ENCOUNTER FOR CHANGE OR REMOVAL OF SURGICAL WOUND DRESSING: ICD-10-CM

## 2025-06-23 DIAGNOSIS — R21 RASH AND OTHER NONSPECIFIC SKIN ERUPTION: ICD-10-CM

## 2025-06-23 DIAGNOSIS — Z48.03 ENCOUNTER FOR CHANGE OR REMOVAL OF DRAINS: ICD-10-CM

## 2025-06-23 PROCEDURE — 99024 POSTOP FOLLOW-UP VISIT: CPT

## 2025-06-23 PROCEDURE — 3008F BODY MASS INDEX DOCD: CPT | Performed by: PHYSICIAN ASSISTANT

## 2025-06-23 RX ORDER — CAMPHOR 0.45 %
GEL (GRAM) TOPICAL DAILY PRN
Qty: 35 G | Refills: 0 | Status: SHIPPED | OUTPATIENT
Start: 2025-06-23 | End: 2025-06-30

## 2025-06-23 RX ORDER — HYDROCORTISONE 1 %
CREAM (GRAM) TOPICAL DAILY PRN
Qty: 35 G | Refills: 0 | Status: SHIPPED | OUTPATIENT
Start: 2025-06-23 | End: 2025-07-07

## 2025-06-23 ASSESSMENT — PAIN SCALES - GENERAL: PAINLEVEL_OUTOF10: 0-NO PAIN

## 2025-06-23 NOTE — PROGRESS NOTES
Phillip John is a 60 y.o. female here for a postoperative visit.  She had a right simple mastectomy with BULMARO flap reconstruction on 6/6/2025  She is doing well overall.  She developed a rash over the weekend which got significantly worse yesterday.  She has reached out to Dr. Liz's office about the rash.  It is on the right chest and right flank and abdomen.    Pathology :  FINAL DIAGNOSIS   A.  Right breast, mastectomy:  --Focal duct with cytologic atypia and features suggestive of degenerative changes, origin from ductal carcinoma in situ (DCIS) cannot be excluded; see note  --Breast tissue with chronic inflammation and reactive changes, calcifications associated with ducts and lobules  --Skin with mild chronic inflammation     Note: Microscopic examination shows a single duct with cytologic atypia.  No significant proliferation within the duct is appreciated.  Cells with atypia also demonstrate morphologic features suggestive of degenerative changes.  This duct with cytologic atypia is more than 2 mm away from the surgical resection margins.    Immunohistochemical study has been performed.  HER2 is positive (strong complete membranous immunoreactivity) in cells of the duct with cytologic atypia.  ER and IA are positive in the cells of the duct with cytologic atypia.  SMMHC and p63 are positive in ducts with cytologic atypia ERG is negative in the duct with cytologic atypia and positive in endothelial cells.  D2-40 shows nonspecific staining in ducts.  E-cadherin shows retained membranous expression in epithelial cells.       Objective     Physical Exam  Chest:          Comments: There is no cervical, supraclavicular, or axillary lymphadenopathy.  In the left breast, there are no dominant masses, no skin changes, and no nipple discharge.  On the right side, there is a well-healed BULMARO flap.  Incisions are healing well.  Flap is soft.  Slight erythema in the upper inner aspect with a  punctate red rash throughout most of the right chest, flank and upper abdomen      Alert and oriented.      Assessment/Plan     Right breast cancer diagnosed in June 2022.  Invasive ductal carcinoma, grade 2-3; ER greater than 95%, MT 40%, HER2 positive.  Clinical stage II A (cT1c N1)  -ypT1c N2a following neoadjuvant chemotherapy    Prophylactic right mastectomy with BULMARO flap reconstruction.    Pathology results were reviewed with the patient.  The mastectomy specimen showed a focal duct with cytologic atypia/ductal carcinoma in situ cannot be excluded.  We have discussed this diagnosis in detail.  I have discussed that the treatment of ductal carcinoma in situ would be mastectomy.  No evaluation of lymph nodes is needed and node radiation is necessary.  This was 1 focal duct and was greater than 2 mm from any margin.  It is also not 100% clear whether or not this was DCIS.  I do not feel that any additional treatment is needed.  I am referring her to medical oncology to discuss whether or not they would like to switch her from anastrozole to a different endocrine therapy.      Doing well following surgery.  May resume all activities per Dr. Liz.  Follow-up with Dr. Liz guarding the rash    Follow-up with me in September 2025 with a left mammogram.     Referral to medical oncology.    Regi Maldonado MD

## 2025-06-27 NOTE — PROGRESS NOTES
Division of Plastic and Reconstructive Surgery  Clinic Visit Note     Subjective   Patient ID: Yvrose John is a 60 y.o. female presenting for a post operative visit from surgery on 6/6/25.    History of Present Illness: History of right breast cancer, T2 N1 M0 invasive ductal carcinoma, metastasized to right axillary lymph node. Right lumpectomy with removal of 18 lymph nodes 11/30/22 with Dr. Maldonado. She had 29 radiation treatments completed March 2023 and 20 chemo treatments completed October 2023.  She presents today postoperatively from surgery on 6/6/25 for a right mastectomy by breast surgery (Dr. Maldonado) and right breast BULMARO breast reconstruction with plastic surgery (Dr. Liz).     6/23: Pt pain well controlled-ok to wean pain meds. R YESSICA removed- discontinue bactrim. endorses noticing some increased fullness laterally at the soft tissue adjacent to her right breast plus a rash which extends laterally from the top aspect of her right breast and upwards into her pre-axillary region and proximal right arm. Start Hydrocortisone cream once daily in AM and application of prescribed topical benadryl cream once daily in the PM as needed for pruritus and rash at right breast. Continue light activity. F/U 2 weeks.   7/7:     Review of Systems  ROS: All 10 systems were reviewed and are unremarkable except for those mentioned in HPI.     Objective   There were no vitals filed for this visit.    Physical Exam  Vitals and nursing note reviewed. Exam conducted with a chaperone present.   Constitutional:       General: She is not in acute distress.     Appearance: She is not ill-appearing.   Eyes:      Extraocular Movements: Extraocular movements intact.      Conjunctiva/sclera: Conjunctivae normal.      Pupils: Pupils are equal, round, and reactive to light.   Cardiovascular:      Rate and Rhythm: Normal rate and regular rhythm.      Pulses: Normal pulses.   Pulmonary:      Effort: Pulmonary effort is normal.       Breath sounds: Normal breath sounds.   Abdominal: ***      Umbilicus healing with some residual dark brown to black scabbed, eschar material which is adherent to surgical incision line, site otherwise GRIS. No surrounding erythema. No fluctuance, induration, or drainage at this time. Lower transverse abdominal incision line healed with some scattered scabbed regions. No thompson-incisional erythema, induration, dehiscence or drainage. Prior YESSICA drain insertion site on the right and left lower abdominal wall now healed closed, no active drainage.      Palpations: Abdomen is soft. There is no mass.      Tenderness: There is no abdominal tenderness.      Hernia: No hernia is present.   Musculoskeletal:         General: No swelling or tenderness.      Cervical back: Normal range of motion and neck supple.   Skin:     Capillary Refill: Capillary refill takes less than 2 seconds.      Coloration: Skin is not jaundiced.      Findings: No bruising or rash.   Neurological:      General: No focal deficit present.      Mental Status: She is oriented to person, place, and time.   Psychiatric:         Mood and Affect: Mood normal.         Behavior: Behavior normal.         Thought Content: Thought content normal.         Judgment: Judgment normal.     Focused exam of the right breast: Free flap recipient site intact at generalized aspect of right breast.  Flap is warm, soft, compressible, appropriate coloration.  Flap incisions are well-approximated and healing with some residual spots of Dermabond.  There is chronic and stable darkish red tissue discoloration to the superior mastectomy tissue of the right breast which appears consistent with prior exams.  There is new tissue induration which is nontender to palpation at the lateral aspect of the right breast at the right inferior axillary line.  There is no fluctuance to suggest fluid accumulation at this region.  No overlying tissue erythema at this site.  There is a region of  rash/skin eruption spanning from the superior mastectomy tissue of the right breast laterally to the right axillary region and proximal aspect of the anterior right upper arm.  Patient notes pruritus at this site.***    Assessment/Plan   Patient presents for a postoperative visit status post completion mastectomy with right breast and reconstruction with autologous tissue Maxine flap.  Patient continues to progress appropriately postoperatively.  She did have some newly noted induration at the lateral aspect of the right reconstructed breast at the negative tissue was considered likely secondary to intraoperative suture placement at this site.  There is consistent reddened coloration of the mastectomy tissue superiorly at the right breast which is consistent with patient's history of prior radiation skin changes at this site.  No active concerns for infection at this time.  Patient does have a new rash/nonspecific skin eruption spanning the right upper breast and into the proximal right upper arm which is considered likely secondary to irritation versus reaction.***    Plan:  - She was advised to continue light activity as tolerated. No pushing, pulling, or lifting objects greater than 5 pounds for at least 8 weeks postoperatively. Not clear to drive until re-assessed at next FUV***.   - She is OK to shower but should avoid scrubbing of incision sites, no prolonged submersion.    - Breast, abdominal and umbilical incision lines can otherwise be left open to air, no creams or dressings required.   - Continue wearing surgical compression bra and abdominal binder for majority of day as tolerated. Ensure binder sits below the left breast to avoid compression.  - Please additionally monitor for any developing signs of infection which may include increased redness, swelling, fever/chills, green/yellow drainage, or foul odor from surgical sites or wounds.   - If any signs of infection or changes in flap appearance are to  occur, please immediately contact the plastic surgery office.   - Continue to hold anastrozole, follow up resuming at next visit in *** weeks.     , PAKartikC  Plastic and Reconstructive Surgery

## 2025-06-30 LAB
LAB AP ASR DISCLAIMER: NORMAL
LABORATORY COMMENT REPORT: NORMAL
PATH REPORT.FINAL DX SPEC: NORMAL
PATH REPORT.GROSS SPEC: NORMAL
PATH REPORT.RELEVANT HX SPEC: NORMAL
PATH REPORT.TOTAL CANCER: NORMAL

## 2025-06-30 PROCEDURE — 88360 TUMOR IMMUNOHISTOCHEM/MANUAL: CPT | Performed by: STUDENT IN AN ORGANIZED HEALTH CARE EDUCATION/TRAINING PROGRAM

## 2025-06-30 PROCEDURE — 88341 IMHCHEM/IMCYTCHM EA ADD ANTB: CPT | Performed by: STUDENT IN AN ORGANIZED HEALTH CARE EDUCATION/TRAINING PROGRAM

## 2025-06-30 PROCEDURE — 88341 IMHCHEM/IMCYTCHM EA ADD ANTB: CPT | Mod: TC,SUR,WESLAB,59 | Performed by: SURGERY

## 2025-06-30 PROCEDURE — 88342 IMHCHEM/IMCYTCHM 1ST ANTB: CPT | Performed by: STUDENT IN AN ORGANIZED HEALTH CARE EDUCATION/TRAINING PROGRAM

## 2025-07-01 ENCOUNTER — TELEPHONE (OUTPATIENT)
Dept: PLASTIC SURGERY | Facility: HOSPITAL | Age: 60
End: 2025-07-01

## 2025-07-01 ENCOUNTER — OFFICE VISIT (OUTPATIENT)
Dept: SURGICAL ONCOLOGY | Facility: CLINIC | Age: 60
End: 2025-07-01
Payer: COMMERCIAL

## 2025-07-01 VITALS
SYSTOLIC BLOOD PRESSURE: 137 MMHG | HEART RATE: 74 BPM | HEIGHT: 63 IN | RESPIRATION RATE: 18 BRPM | BODY MASS INDEX: 34.02 KG/M2 | WEIGHT: 192 LBS | TEMPERATURE: 98.6 F | DIASTOLIC BLOOD PRESSURE: 86 MMHG

## 2025-07-01 DIAGNOSIS — C50.911 BREAST CANCER METASTASIZED TO AXILLARY LYMPH NODE, RIGHT: Primary | ICD-10-CM

## 2025-07-01 DIAGNOSIS — C77.3 BREAST CANCER METASTASIZED TO AXILLARY LYMPH NODE, RIGHT: Primary | ICD-10-CM

## 2025-07-01 PROCEDURE — 3008F BODY MASS INDEX DOCD: CPT | Performed by: SURGERY

## 2025-07-01 PROCEDURE — 99211 OFF/OP EST MAY X REQ PHY/QHP: CPT | Mod: 24 | Performed by: SURGERY

## 2025-07-01 PROCEDURE — 1036F TOBACCO NON-USER: CPT | Performed by: SURGERY

## 2025-07-01 ASSESSMENT — PAIN SCALES - GENERAL: PAINLEVEL_OUTOF10: 0-NO PAIN

## 2025-07-01 NOTE — TELEPHONE ENCOUNTER
Spoke with Yvrose regarding her most recent [employer] message.   She reports that the rash first involved the superior portion of right breast with minimal itching in clinic on 6/23. At this visit, she stopped bactrim and was Rx'ed a steroid cream and benadryl. She reports that this treatment was helping to minimize her itching symptom. She denies fevers, chills, increased pain, SOB or difficulty breathing. Of note, she noticed that the rash started to spread to her right side  on Sunday and in today's visit with Dr Maldonado, she noted some skin manifestations involving the left central breast. Other then the increased SA of the rash she has no concerning symptoms.  She does not associate cause or worsening factors. She has no changes to skin products, environmental elements or new medications.   Discussed the option of her coming to [facility] tomorrow at 9 am for further evaluation. She was agreeable to this.     I reviewed that if her symptoms worsen or she starts to develop additional fevers, chills, increased pain, SOB or difficulty breathing to be seen in local ED for workup. She voiced understanding and as satisfied with call and had no further questions or concerns.     Bronwyn Dao PA-C  7/1/2025

## 2025-07-02 ENCOUNTER — OFFICE VISIT (OUTPATIENT)
Dept: PLASTIC SURGERY | Facility: CLINIC | Age: 60
End: 2025-07-02
Payer: COMMERCIAL

## 2025-07-02 VITALS
SYSTOLIC BLOOD PRESSURE: 118 MMHG | BODY MASS INDEX: 34.02 KG/M2 | HEART RATE: 56 BPM | DIASTOLIC BLOOD PRESSURE: 77 MMHG | HEIGHT: 63 IN | WEIGHT: 192 LBS | RESPIRATION RATE: 16 BRPM

## 2025-07-02 DIAGNOSIS — R21 SKIN RASH: Primary | ICD-10-CM

## 2025-07-02 PROCEDURE — 88305 TISSUE EXAM BY PATHOLOGIST: CPT

## 2025-07-02 PROCEDURE — 0753T DGTZ GLS MCRSCP SLD LEVEL IV: CPT

## 2025-07-02 PROCEDURE — 88305 TISSUE EXAM BY PATHOLOGIST: CPT | Performed by: PATHOLOGY

## 2025-07-02 RX ORDER — DIPHENHYDRAMINE HCL 25 MG
25 TABLET ORAL NIGHTLY PRN
COMMUNITY

## 2025-07-02 RX ORDER — HYDROCORTISONE BUTYRATE 1 MG/G
CREAM TOPICAL
COMMUNITY

## 2025-07-02 ASSESSMENT — PAIN SCALES - GENERAL: PAINLEVEL_OUTOF10: 0-NO PAIN

## 2025-07-02 NOTE — PROGRESS NOTES
Department of Plastic and Reconstructive Surgery  Clinic Visit Note    Patient Name: Yvrose John  MRN: [id number]  Date:  07/02/25   Subjective  Patient ID: Yvrose John is a 60 y.o. female presenting for a post operative visit from surgery on 6/6/25.     Yvrose John is a 60 y.o. female with a past medical history of right breast cancer, T2 N1 M0 invasive ductal carcinoma, metastasized to right axillary lymph node. Right lumpectomy with removal of 18 lymph nodes 11/30/22 with Dr. Maldonado. She had 29 radiation treatments completed March 2023 and 20 chemo treatments completed October 2023.  She presents today for a post-operative visit from surgery on 6/6/25 for a right mastectomy by breast surgery (Dr. Maldonado) and right breast BULMARO breast reconstruction with plastic surgery (Dr. Liz).     Pt reports pain is well-controlled to right breast. Denies any drainage from incisions. Reports a rash first involved superior portion of her right breast with minimal itching at her clinic visit on 06/23. After her clinic visit, she stopped bactrim and was sent a steroid cream and topical benadryl to her pharmacy to apply to the rash. States that this treatment helped minimize her itching at the rash. However, the rash has started so spread from the superior portion to her breast to her central breast, on her right side (flank) region as well as spread to the left central breast. Denies any changes to detergent, soap, skin care products, medications or any change in her usual routine. Denies any exacerbating factors to the rash. She further denies fevers, chills, increased pain, SOB or difficulty breathing. Describes the rash as itchy and has been spreading to other areas of her body since 06/23. Denies any other acute concerns at this time.    Medical History[1]  Surgical History[2]  Allergies[3]  Current Medications[4]   Family History[5]  Social History[6]    Review of Systems   ROS: All 10 systems were  "reviewed and are unremarkable except for those mentioned in HPI.   Objective    /77 (BP Location: Left arm, Patient Position: Sitting, BP Cuff Size: Large adult)   Pulse 56   Resp 16   Ht 1.6 m (5' 3\")   Wt 87.1 kg (192 lb)   BMI 34.01 kg/m²      Physical Exam   Physical Exam  Constitutional: A&Ox3, calm and cooperative, NAD.  Eyes: EOMI, clear sclera.  ENMT: Moist mucous membranes, no apparent injuries or lesions.  Head/Neck: NC/AT. Neck supple.   Cardiovascular: Normal rate and regular rhythm.   Respiratory/Thorax: Regular respirations on RA.  Gastrointestinal: Abdomen soft, non-tender.   Genitourinary: Voiding independently.   Extremities: No peripheral edema. Moves all 4 extremities actively, strength appropriate.   Neurological: A&Ox3.   Psychological: Appropriate mood and behavior.   Skin: Warm and dry, no rashes.      Focused exam of the right breast: Free flap recipient site intact at generalized aspect of right breast.  Flap is warm, soft, compressible, appropriate coloration.  Flap incisions are well-approximated and healing with some residual spots of Dermabond. There is chronic and stable darkish red tissue discoloration to the superior mastectomy tissue of the right breast which appears consistent with prior exams.  There is new tissue induration which is nontender to palpation at the lateral aspect of the right breast at the right inferior axillary line.  There is no fluctuance to suggest fluid accumulation at this region.  No overlying tissue erythema at this site.  There is a region of rash/skin eruption spanning from the superior mastectomy tissue of the right breast laterally to the right axillary region and proximal aspect of the anterior right upper arm.  Patient notes pruritus at this site.     Assessment/Plan  Patient presents for a postoperative visit status post completion mastectomy with right breast and reconstruction with autologous tissue Maxine flap. Patient continues to " progress appropriately postoperatively. There is consistent reddened coloration of the mastectomy tissue superiorly at the right breast which is consistent with patient's history of prior radiation skin changes at this site. Patient does have a persistent pruritic rash that started at the superior right breast on 06/23 and has spread centrally to her right breast, to her right flank and central portion of the left breast. The rash/nonspecific skin eruption itself is erythematous pinpoint vesicle like-rash. No induration, drainage or edema present at right breast or surrounding skin.    Plan:  - Punch biopsy performed in office, tissue specimen sent to Dermatopathology for further investigation, will continue to follow up for results  - Prescribed stronger strength hydrocortisone cream 2.5% apply thin layer BID to rash, may also use benadryl cream as needed for pruritus and rash  - OK to take any of the following (Zyrtec/Claritin/Allegra) OTC once daily   - OK to take OTC oral benadryl at night to help with itching, advised on drowsiness effect and to not take during the day or drive while taking medication  - Recommend continue wearing surgical compression bra and abdominal binder for majority of day as tolerated with consideration of current symptoms   - If any signs of infection or changes in flap appearance are to occur, please immediately contact the plastic surgery office  - If rash continues to worsen and spread please contact plastic surgery office for evaluation  - Encouraged patient to continue to send photos of progression of rash in MyChart  - Scheduled for follow up clinic appointment 07/07    Adriana Dennis PA-C  Plastic and Reconstructive Surgery        [1]   Past Medical History:  Diagnosis Date    Breast cancer 06/2022    Changes in skin texture 05/31/2022    Breast skin changes    Heart murmur     History of mitral valve prolapse     History of rheumatic fever     Hx antineoplastic chemo      Personal history of irradiation     Personal history of other specified conditions 05/31/2022    History of lump of right breast    Rheumatic fever    [2]   Past Surgical History:  Procedure Laterality Date    BREAST LUMPECTOMY Right 11/30/2022    right lumpectomy w/ chemo and rad    DILATION AND CURETTAGE OF UTERUS  07/02/2013    Dilation And Curettage    HYSTEROSCOPY  07/02/2013    Hysteroscopy With Endometrial Ablation    IR CVC PORT REMOVAL  11/15/2023    IR CVC PORT REMOVAL 11/15/2023 Shawn Shahid MD [facility]    IR CVC PORT REMOVAL  11/15/2023    IR CVC PORT REMOVAL    MASTECTOMY COMPLETE / SIMPLE Right 6/6/2025    Procedure: MASTECTOMY, UNILATERAL, RIGHT BREAST;  Surgeon: Regi Maldonado MD;  [facility]: [facility];  Service: General Surgery Breast Oncology;  Laterality: Right;    OTHER SURGICAL HISTORY  07/12/2013    Laparoscopic Excision Left Fallopian Tube Cyst (___ Cm)    TOTAL ABDOMINAL HYSTERECTOMY  07/12/2013    Total Abdominal Hysterectomy    TUBAL LIGATION  07/02/2013    Tubal Ligation   [3] No Known Allergies  [4]   Current Outpatient Medications:     arm brace (Elbow Compression Sleeve) misc, Lymphedema Sleeve  and Gauntlet eval and fit 20-30 mmHG for right arm, Disp: 1 each, Rfl: 0    aspirin 81 mg chewable tablet, Chew 1 tablet (81 mg) once daily for 26 days., Disp: 26 tablet, Rfl: 0    diphenhydrAMINE (Sominex) 25 mg tablet, Take 1 tablet (25 mg) by mouth as needed at bedtime for sleep., Disp: , Rfl:     hydrocortisone butyrate (Locoid) 0.1 % cream cream, Apply topically., Disp: , Rfl:     [Paused] anastrozole (Arimidex) 1 mg tablet, Take 1 tablet (1 mg total) by mouth once daily., Disp: 90 tablet, Rfl: 3    bacitracin 500 unit/gram ointment, Apply to affected area daily (Patient not taking: Reported on 7/2/2025), Disp: 14 g, Rfl: 0    Current Facility-Administered Medications:     hydrocortisone 2.5 % cream, , Topical, BID, Adriana Dennis PA-C  [5]   Family History  Problem Relation  Name Age of Onset    Hypertension Mother      Diabetes type II Father      Prostate cancer Father      Breast cancer Sister     [6]   Social History  Tobacco Use    Smoking status: Former     Current packs/day: 0.00     Types: Cigarettes     Quit date:      Years since quittin.     Passive exposure: Never    Smokeless tobacco: Never   Vaping Use    Vaping status: Never Used   Substance Use Topics    Alcohol use: Not Currently     Alcohol/week: 2.0 standard drinks of alcohol     Types: 2 Glasses of wine per week     Comment: social    Drug use: Never

## 2025-07-03 ENCOUNTER — OFFICE VISIT (OUTPATIENT)
Dept: HEMATOLOGY/ONCOLOGY | Facility: CLINIC | Age: 60
End: 2025-07-03
Payer: COMMERCIAL

## 2025-07-03 VITALS
OXYGEN SATURATION: 98 % | RESPIRATION RATE: 16 BRPM | DIASTOLIC BLOOD PRESSURE: 84 MMHG | SYSTOLIC BLOOD PRESSURE: 152 MMHG | TEMPERATURE: 97.5 F | BODY MASS INDEX: 34.23 KG/M2 | HEART RATE: 66 BPM | WEIGHT: 193.23 LBS

## 2025-07-03 DIAGNOSIS — C77.3 BREAST CANCER METASTASIZED TO AXILLARY LYMPH NODE, RIGHT: ICD-10-CM

## 2025-07-03 DIAGNOSIS — R21 RASH: Primary | ICD-10-CM

## 2025-07-03 DIAGNOSIS — C50.911 BREAST CANCER METASTASIZED TO AXILLARY LYMPH NODE, RIGHT: ICD-10-CM

## 2025-07-03 PROCEDURE — 99215 OFFICE O/P EST HI 40 MIN: CPT | Performed by: INTERNAL MEDICINE

## 2025-07-03 RX ORDER — HYDROCORTISONE 25 MG/G
CREAM TOPICAL 2 TIMES DAILY
Qty: 10 G | Refills: 0 | Status: SHIPPED | OUTPATIENT
Start: 2025-07-03

## 2025-07-03 ASSESSMENT — PAIN SCALES - GENERAL: PAINLEVEL_OUTOF10: 0-NO PAIN

## 2025-07-03 NOTE — DOCUMENTATION CLARIFICATION NOTE
PATIENT:               ARUN PAIZ  ACCT #:                  [id number]  MRN:                       [id number]  :                       1965  ADMIT DATE:       2025 5:22 AM  DISCH DATE:        6/10/2025 3:41 PM  RESPONDING PROVIDER #:        [id number]          PROVIDER RESPONSE TEXT:    The 25 pathology results revealed - benign findings of the right breast    CDI QUERY TEXT:    Clarification    Instruction:    Based on your assessment of the patient and the clinical information, please provide the requested documentation by clicking on the appropriate radio button and enter any additional information if prompted.    Question:  Please clarify the 25 pathology report findings    When answering this query, please exercise your independent professional judgment. The fact that a question is being asked, does not imply that any particular answer is desired or expected.    The patient's clinical indicators include:  Clinical Information:  6/10/25 Discharge Summary:  60 y.o. female with a past medical history of heart murmur, MVP and right breast cancer that metastasized to right axillary lymph node. Right lumpectomy and excision of 18 lymph nodes 22 with Dr. Maldonado. Following surgery, she completed 29 radiation treatments by 2023 and 20 chemo treatments by 2023. Now s/p Right mastectomy with unilateral BULMARO reconstruction with Dr Piedra and Dr Liz on 2025    Clinical Indicators and the 25 Pathology Findings:  Result:  A.  Right breast, mastectomy:  --Focal duct with cytologic atypia and features suggestive of degenerative changes, origin from ductal carcinoma in situ (DCIS) cannot be excluded; see note  --Breast tissue with chronic inflammation and reactive changes, calcifications associated with ducts and lobules  --Skin with mild chronic inflammation  Note: Microscopic examination shows a single duct with cytologic atypia.  No significant proliferation within the duct  is appreciated.  Cells with atypia also demonstrate morphologic features suggestive of degenerative changes.  This duct with cytologic atypia is more than 2 mm away from the surgical resection margins.  Immunohistochemical study has been performed.  HER2 is positive (strong complete membranous immunoreactivity) in cells of the duct with cytologic atypia.  ER and CO are positive in the cells of the duct with cytologic atypia.  SMMHC and p63 are positive in ducts with cytologic atypia ERG is negative in the duct with cytologic atypia and positive in endothelial cells.  D2-40 shows nonspecific staining in ducts.  E-cadherin shows retained membranous expression in epithelial cells.  This case (A13 and accompanying immunostains) has been reviewed at the [facility] breast pathology consensus conference via Zoom on 6/30/25.  Options provided:  -- The 6/6/25 pathology results revealed - benign findings of the right breast  -- The 6/6/25 pathology results revealed - right breast ductal carcinoma in situ  -- The 6/6/25 pathology results revealed - right breast ductal carcinoma in situ (DCIS) cannot be excluded  -- Other - I will add my own diagnosis  -- Refer to Clinical Documentation Reviewer    Query created by: Leigh Kahn on 7/2/2025 12:10 PM      Electronically signed by:  GISELE CANO MD 7/3/2025 10:37 AM

## 2025-07-03 NOTE — PROGRESS NOTES
Patient here for new patient visit with Dr Jey Burns for Dx of breast cancer.  Patient here with her spouse.     Medications and Allergies reviewed and reconciled this visit by MD per her request     No concerns or complaints noted at this time.     Pt reports appetite is good.  Dizziness: denies   Bleeding: denies   PICA: denies   Fevers/Chills/weight loss/night sweats: denies     Referred by Dr. Rock Shields today.  Telemed in 3 & 6 weeks.   MRI left breast  Follow up per  MD  request.    Pt. reports availability and use of mychart, Reviewed this is a good place to communicate with the team as well as review labs and upcoming orders.    No barriers to education noted, patient agrees to current plan and verbalized understanding using teach back method.

## 2025-07-03 NOTE — PROGRESS NOTES
DIVISION OF MEDICAL ONCOLOGY    Patient ID: Yvrose John is a 60 y.o. female.  MRN: [id number]  : 1965  The patient presents to clinic today for her history of right breast cancer.     Cancer Staging   Breast cancer metastasized to axillary lymph node, right  Staging form: Breast, AJCC 8th Edition  - Pathologic stage from 2022: No Stage Recommended - Signed by Danny Chowdary MD on 10/28/2023  pT1c  pN2a  cM0  Andrea grade: G2  ER Status: Positive  WI Status: Positive  HER2 Status: Positive    Diagnostic/Therapeutic History:  - Noted pain, rash right breast.  - 2022: Skin biopsy showed eosinophilic spongiotic dermatitis. Mammogram and ultrasound showed a 1.4 cm right breast mass with multiple abnormal right axillary LN's.  - 6/3/22: US-guided biopsy of the right breast mass showed IDC, grade 2-3, ER >95% WI 40%, Her2+ (3+ IHC). An axillary LN was positive for metastatic carcinoma.  - 22: PET/CT did not show any evidence of distant metastatic disease, but did show hypermetabolic activity in the 2.5 cm right breast mass and ALN's (cT2N1).  - Initiated on TCHP 22. She developed papilledema that was attributed to the carboplatin, so this was removed from cycle 2-6. Completed cycle #6 of THP on 10/17/22.  - HP initiated while awaiting surgery.  - Right lumpectomy/ALND performed on 22. 1.4 cm of residual grade 2 breast tumor noted with 4 LN's positive for carcinoma (2 macrometastases; 2 micrometastases). No GALI noted, but tumor deposits present in perinodal soft tissue. +LVI. Margins negative.  ypT1c ypN2a.  - Initial plan was for immediate reconstruction, but ultimately decided to purse adjuvant RT.  - Switched to adjuvant TDM1 23. Completed 14 cycles 10/2023.  - Adjuvant RT 23-3/16/23.  - 3/2023: Tamoxifen initiated, switched to anastrozole after a few months once postmenopausal status was confirmed.  -25 for a right mastectomy by Dr. Maldonado and Right BULMARO breast  reconstruction with Dr. Liz     History of Present Illness (HPI)/Interval History:  Yvrose John presents today for routine FUV on anastrozole. Has been off since surgery. Will restart once cleared by plastics.     Overall, she is doing well since her last visit. She underwent a right mastectomy with BULMARO breast reconstruction. Recovering well.     She denies any new breast cancer concerns.     She denies any chest pain or breathing issues.     She denies any vision changes or headache issues, dizziness, loss of balance or falls.     She denies any new or unexplained bone aches or pains.    She denies any skin lesions or masses.    She reports a normal appetite. Some abdominal discomfort 2/2 surgery, tolerable with tylenol.     She denies any issues with sleep, fatigue, or mood swings. Mild hot flashes.     Review of Systems:  14-point ROS otherwise negative, as per HPI/Interval History.    Past Medical History:   Diagnosis Date    Breast cancer 06/2022    Changes in skin texture 05/31/2022    Breast skin changes    Heart murmur     History of mitral valve prolapse     History of rheumatic fever     Hx antineoplastic chemo     Personal history of irradiation     Personal history of other specified conditions 05/31/2022    History of lump of right breast    Rheumatic fever      Patient Active Problem List   Diagnosis    Anemia    Breast cancer metastasized to axillary lymph node, right    Chest pain on exertion    Contact dermatitis due to poison ivy    Hot flashes due to menopause    Thyroid fullness    Palpitations    Lymphedema    Hypokalemia    COVID-19    S/P radiation therapy    History of breast cancer    Pre-op exam    Exposure to other ionizing radiation, sequela    Breast deformity    Encounter for change or removal of surgical wound dressing    Lymphedema of breast    Acquired breast deformity    Breast pain    Malignant neoplasm of right female breast    Contour deformity of breast, acquired     Acute respiratory failure with hypoxia    Immunodeficiency, unspecified    Surgical site infection        Past Surgical History:   Procedure Laterality Date    BREAST LUMPECTOMY Right 2022    right lumpectomy w/ chemo and rad    DILATION AND CURETTAGE OF UTERUS  2013    Dilation And Curettage    HYSTEROSCOPY  2013    Hysteroscopy With Endometrial Ablation    IR CVC PORT REMOVAL  11/15/2023    IR CVC PORT REMOVAL 11/15/2023 Shawn Shahid MD [facility]    IR CVC PORT REMOVAL  11/15/2023    IR CVC PORT REMOVAL    MASTECTOMY COMPLETE / SIMPLE Right 2025    Procedure: MASTECTOMY, UNILATERAL, RIGHT BREAST;  Surgeon: Regi Maldonado MD;  Location: [facility];  Service: General Surgery Breast Oncology;  Laterality: Right;    OTHER SURGICAL HISTORY  2013    Laparoscopic Excision Left Fallopian Tube Cyst (___ Cm)    TOTAL ABDOMINAL HYSTERECTOMY  2013    Total Abdominal Hysterectomy    TUBAL LIGATION  2013    Tubal Ligation       Social History     Socioeconomic History    Marital status:    Tobacco Use    Smoking status: Former     Current packs/day: 0.00     Types: Cigarettes     Quit date:      Years since quittin.5     Passive exposure: Never    Smokeless tobacco: Never   Vaping Use    Vaping status: Never Used   Substance and Sexual Activity    Alcohol use: Not Currently     Alcohol/week: 2.0 standard drinks of alcohol     Types: 2 Glasses of wine per week     Comment: social    Drug use: Never    Sexual activity: Defer     Social Drivers of Health     Financial Resource Strain: Low Risk  (2025)    Overall Financial Resource Strain (CARDIA)     Difficulty of Paying Living Expenses: Not very hard   Food Insecurity: No Food Insecurity (2025)    Hunger Vital Sign     Worried About Running Out of Food in the Last Year: Never true     Ran Out of Food in the Last Year: Never true   Transportation Needs: No Transportation Needs (2025)    PRAPARE -  Transportation     Lack of Transportation (Medical): No     Lack of Transportation (Non-Medical): No   Physical Activity: Sufficiently Active (6/13/2023)    Received from [facility]    Exercise Vital Sign     Days of Exercise per Week: 7 days     Minutes of Exercise per Session: 30 min   Stress: No Stress Concern Present (6/13/2023)    Received from [facility]    [place] [employer] - Occupational Stress Questionnaire     Feeling of Stress : Only a little   Social Connections: Moderately Isolated (6/13/2023)    Received from [facility]    Social Connection and Isolation Panel [NHANES]     Frequency of Communication with Friends and Family: Three times a week     Frequency of Social Gatherings with Friends and Family: Once a week     Attends Church Services: Never     Active Member of Clubs or Organizations: No     Attends Club or Organization Meetings: Never     Marital Status:    Intimate Partner Violence: Not At Risk (6/8/2025)    Humiliation, Afraid, Rape, and Kick questionnaire     Fear of Current or Ex-Partner: No     Emotionally Abused: No     Physically Abused: No     Sexually Abused: No   Housing Stability: Low Risk  (6/8/2025)    Housing Stability Vital Sign     Unable to Pay for Housing in the Last Year: No     Number of Times Moved in the Last Year: 0     Homeless in the Last Year: No       Family History   Problem Relation Name Age of Onset    Hypertension Mother      Diabetes type II Father      Prostate cancer Father      Breast cancer Sister         Allergies:   No Known Allergies      Current Outpatient Medications:     [Paused] anastrozole (Arimidex) 1 mg tablet, Take 1 tablet (1 mg total) by mouth once daily., Disp: 90 tablet, Rfl: 3    arm brace (Elbow Compression Sleeve) misc, Lymphedema Sleeve  and Gauntlet eval and fit 20-30 mmHG for right arm, Disp: 1 each, Rfl: 0    aspirin 81 mg chewable tablet, Chew 1 tablet (81 mg) once daily for 26 days.,  Disp: 26 tablet, Rfl: 0    bacitracin 500 unit/gram ointment, Apply to affected area daily (Patient not taking: Reported on 2025), Disp: 14 g, Rfl: 0    diphenhydrAMINE (Sominex) 25 mg tablet, Take 1 tablet (25 mg) by mouth as needed at bedtime for sleep., Disp: , Rfl:     hydrocortisone butyrate (Locoid) 0.1 % cream cream, Apply topically., Disp: , Rfl:     Current Facility-Administered Medications:     hydrocortisone 2.5 % cream, , Topical, BID, Adriana Dennis PA-C     Objective    BSA: 1.97 meters squared  /84 (BP Location: Left arm, Patient Position: Sitting, BP Cuff Size: Adult long)   Pulse 66   Temp 36.4 °C (97.5 °F) (Temporal)   Resp 16   Wt 87.6 kg (193 lb 3.7 oz)   SpO2 98%   BMI 34.23 kg/m²   Wt Readings from Last 3 Encounters:   25 87.6 kg (193 lb 3.7 oz)   25 87.1 kg (192 lb)   25 87.1 kg (192 lb)     ECOG Performance Status:  The ECOG performance scale today is ECO- Restricted in physically strenuous activity.  Carries out light duty.    Physical Exam  Vitals and nursing note reviewed.   Constitutional:       General: She is awake. She is not in acute distress.     Appearance: Normal appearance. She is not ill-appearing.   HENT:      Head: Normocephalic and atraumatic.   Eyes:      General: No scleral icterus.        Right eye: No discharge.         Left eye: No discharge.   Neck:      Trachea: Trachea and phonation normal. No tracheal tenderness.   Cardiovascular:      Rate and Rhythm: Normal rate and regular rhythm.      Heart sounds: No murmur heard.     No friction rub. No gallop.   Pulmonary:      Effort: Pulmonary effort is normal. No respiratory distress.      Breath sounds: Normal breath sounds. No wheezing.   Chest:   Breasts:     Right: Absent.      Left: No swelling, mass, nipple discharge, skin change or tenderness.          Comments: S/p right mastectomy with BULMARO reconstruction with right breast drain in place. Scar healing well   Abdominal:       Comments: Transabdominal scar healing well C/D/I    Musculoskeletal:         General: No tenderness or deformity. Normal range of motion.      Cervical back: Normal range of motion and neck supple. No rigidity or tenderness.      Right lower leg: No edema.      Left lower leg: No edema.   Lymphadenopathy:      Cervical: No cervical adenopathy.      Upper Body:      Right upper body: No supraclavicular, axillary or pectoral adenopathy.      Left upper body: No supraclavicular, axillary or pectoral adenopathy.   Skin:     General: Skin is warm and dry.      Coloration: Skin is not jaundiced.      Findings: No lesion or rash.   Neurological:      General: No focal deficit present.      Mental Status: She is alert and oriented to person, place, and time. Mental status is at baseline.      Gait: Gait normal.   Psychiatric:         Mood and Affect: Mood normal.         Behavior: Behavior normal.         Thought Content: Thought content normal.         Judgment: Judgment normal.       Laboratory Data:  Lab Results   Component Value Date    WBC 6.1 06/10/2025    HGB 9.5 (L) 06/10/2025    HCT 31.0 (L) 06/10/2025     (H) 06/10/2025    PLT 87 (L) 06/10/2025       Chemistry    Lab Results   Component Value Date/Time     06/10/2025 0620     05/20/2025 1118    K 4.3 06/10/2025 0620    K 3.8 05/20/2025 1118     (H) 06/10/2025 0620     05/20/2025 1118    CO2 22 06/10/2025 0620    CO2 22 05/20/2025 1118    BUN 8 06/10/2025 0620    BUN 14 05/20/2025 1118    CREATININE 0.47 (L) 06/10/2025 0620    CREATININE 0.66 05/20/2025 1118    Lab Results   Component Value Date/Time    CALCIUM 8.6 06/10/2025 0620    CALCIUM 9.8 05/20/2025 1118    ALKPHOS 109 05/20/2025 1118    AST 49 (H) 05/20/2025 1118    ALT 56 (H) 05/20/2025 1118    BILITOT 0.7 05/20/2025 1118        Radiology:  === 05/18/24 ===    BI MR BREAST BILATERAL WITH CONTRAST FULL PROTOCOL    - Impression -  Posttreatment changes of the right breast. No  MRI evidence of  malignancy in either breast.  Clinical follow-up is recommended.  Patient will be due for annual bilateral screening mammogram 06/2024.    BI-RADS CATEGORY:  BI-RADS Category:  2 Benign.  Recommendation:  Annual Screening.  Recommended Date:  1 Year.  Laterality:  Bilateral.    For any future breast imaging appointments, please call [phone]  ([phone]).      MACRO:  None    Signed by: Blessing Dewey 5/21/2024 12:29 PM  Dictation workstation:   [id number]      Assessment/Plan:  Yvrose John is a 60 y.o. female with a history of right ER+/Her2+ breast cancer, who presents today for follow-up evaluation on adjuvant anastrozole.    Breast cancer metastasized to axillary lymph node, right (CMS/HCC)  - Continue anastrozole 1 mg daily (will restart once cleared from plastics)     She had a right simple prophylactic mastectomy with BULMARO flap reconstruction on 6/6/2025  She is doing well overall.  She developed a rash over the weekend which got significantly worse yesterday.  She has reached out to Dr. Liz's office about the rash.  It is on the right chest and right flank and abdomen.     Pathology :  FINAL DIAGNOSIS   A.  Right breast, mastectomy:  --Focal duct with cytologic atypia and features suggestive of degenerative changes, origin from ductal carcinoma in situ (DCIS) cannot be excluded; see note  --Breast tissue with chronic inflammation and reactive changes, calcifications associated with ducts and lobules  --Skin with mild chronic inflammation     Note: Microscopic examination shows a single duct with cytologic atypia.  No significant proliferation within the duct is appreciated.  Cells with atypia also demonstrate morphologic features suggestive of degenerative changes.  This duct with cytologic atypia is more than 2 mm away from the surgical resection margins.    Immunohistochemical study has been performed.  HER2 is positive (strong complete membranous immunoreactivity) in cells of  the duct with cytologic atypia.  ER and AL are positive in the cells of the duct with cytologic atypia.  SMMHC and p63 are positive in ducts with cytologic atypia ERG is negative in the duct with cytologic atypia and positive in endothelial cells.  D2-40 shows nonspecific staining in ducts.  E-cadherin shows retained membranous expression in epithelial cells.     Immunohistochemical study has been performed. HER2 is positive (strong complete membranous immunoreactivity) in cells of the duct with cytologic atypia. ER and AL are positive in the cells of the duct with cytologic atypia.     I have informed her that aromatase inhibitors themselves have been shown to lower the risk of breast cancer in postmenopausal women, including those with atypical hyperplasia. I dont recommend switching to a different AI, but I recommend very close surveillance and/or PM.    Ambry ordered.    MRI left breast ordered.    She will talk to her plastic surgeon about PM.    Telemed in 3 weeks to discuss MRI    Telemed in 6 weeks to discuss AMbry    No orders of the defined types were placed in this encounter.     Jey Burns MD  Hematology and Medical Oncology  [facility]

## 2025-07-07 ENCOUNTER — APPOINTMENT (OUTPATIENT)
Dept: PLASTIC SURGERY | Facility: CLINIC | Age: 60
End: 2025-07-07
Payer: COMMERCIAL

## 2025-07-07 VITALS
HEART RATE: 65 BPM | BODY MASS INDEX: 34.19 KG/M2 | RESPIRATION RATE: 18 BRPM | DIASTOLIC BLOOD PRESSURE: 82 MMHG | WEIGHT: 193 LBS | SYSTOLIC BLOOD PRESSURE: 137 MMHG

## 2025-07-07 DIAGNOSIS — R21 SKIN RASH: ICD-10-CM

## 2025-07-07 DIAGNOSIS — R21 RASH AND NONSPECIFIC SKIN ERUPTION: Primary | ICD-10-CM

## 2025-07-07 PROCEDURE — 99024 POSTOP FOLLOW-UP VISIT: CPT

## 2025-07-07 ASSESSMENT — PAIN SCALES - GENERAL: PAINLEVEL_OUTOF10: 0-NO PAIN

## 2025-07-07 NOTE — LETTER
July 7, 2025     Patient: Yvrose John   YOB: 1965   Date of Visit: 7/7/2025       To Whom It May Concern:     It is my medical opinion that Yvrose John should continue to be off work after her scheduled surgery on 6/6/25.Please allow Mrs. John to continue working remote from home until her scheduled follow up on 8/7/25. We will finalize her return to work date and any restrictions during that scheduled visit.      If you have any questions or concerns, please don't hesitate to call. [phone] Option 5    Sincerely,  Adriana Dennis, PAKartikC

## 2025-07-09 LAB
LABORATORY COMMENT REPORT: NORMAL
PATH REPORT.FINAL DX SPEC: NORMAL
PATH REPORT.GROSS SPEC: NORMAL
PATH REPORT.TOTAL CANCER: NORMAL

## 2025-07-10 NOTE — PROGRESS NOTES
Division of Plastic and Reconstructive Surgery  Clinic Visit Note     Subjective   Patient ID: Yvrose John is a 60 y.o. female presenting for a post operative visit from surgery on 6/6/25.    History of Present Illness: History of right breast cancer, T2 N1 M0 invasive ductal carcinoma, metastasized to right axillary lymph node. Right lumpectomy with removal of 18 lymph nodes 11/30/22 with Dr. Maldonado. She had 29 radiation treatments completed March 2023 and 20 chemo treatments completed October 2023.  She presents today postoperatively from surgery on 6/6/25 for a right mastectomy by breast surgery (Dr. Maldonado) and right breast BULMARO breast reconstruction with plastic surgery (Dr. Liz).     6/23: Pt pain well controlled-ok to wean pain meds. R YESSICA removed- discontinue bactrim. endorses noticing some increased fullness laterally at the soft tissue adjacent to her right breast plus a rash which extends laterally from the top aspect of her right breast and upwards into her pre-axillary region and proximal right arm. Start Hydrocortisone cream once daily in AM and application of prescribed topical benadryl cream once daily in the PM as needed for pruritus and rash at right breast. Continue light activity. F/U 2 weeks.   7/7: Pt following light activity restrictions. Continues to experience constant rash that is spreading to right breast to her right flank and down her right thigh, as well as more medial towards sternum, and left breast. States that it is irritated, warm and itchy. Rash originally started at superior portion of right breast. Hydrocortisone and benadryl cream is offering minimum relief. Denies fevers, chills, n/v/d. Also reports abnormal cells found at her left breast and triple positive findings, has spoken with Dr. Maldonado and Oncology and they plan to do an MRI and monitor her left breast and will have a left breast mastectomy in the future due to new findings.   7/21:    Review of Systems  ROS:  All 10 systems were reviewed and are unremarkable except for those mentioned in HPI.     Objective   There were no vitals taken for this visit.    Physical Exam  Vitals and nursing note reviewed. Exam conducted with a chaperone present.   Constitutional:       General: She is not in acute distress.     Appearance: She is not ill-appearing.   Eyes:      Extraocular Movements: Extraocular movements intact.      Conjunctiva/sclera: Conjunctivae normal.      Pupils: Pupils are equal, round, and reactive to light.   Cardiovascular:      Rate and Rhythm: Normal rate and regular rhythm.      Pulses: Normal pulses.   Pulmonary:      Effort: Pulmonary effort is normal.      Breath sounds: Normal breath sounds.   Abdominal:9      Umbilicus healing with some residual scab material which is adherent to surgical incision line, site otherwise GRIS. No surrounding erythema. No fluctuance, induration, or drainage at this time. Lower transverse abdominal incision line healed with some scattered scabbed regions. No thompson-incisional erythema, induration, dehiscence or drainage. Prior YESSICA drain insertion site on the right and left lower abdominal wall now healed closed, no active drainage.      Palpations: Abdomen is soft. There is no mass.      Tenderness: There is no abdominal tenderness.      Hernia: No hernia is present.   Musculoskeletal:         General: No swelling or tenderness.      Cervical back: Normal range of motion and neck supple.   Skin:     Capillary Refill: Capillary refill takes less than 2 seconds.      Coloration: Skin is not jaundiced.      Findings: No bruising or rash.   Neurological:      General: No focal deficit present.      Mental Status: She is oriented to person, place, and time.   Psychiatric:         Mood and Affect: Mood normal.         Behavior: Behavior normal.         Thought Content: Thought content normal.         Judgment: Judgment normal.     Focused exam of the right breast: Free flap recipient  site intact at generalized aspect of right breast.  Flap is warm, soft, compressible, appropriate coloration.  Flap incisions are well-approximated and healing with some residual spots of Dermabond. There is chronic and stable darkish red tissue discoloration to the superior mastectomy tissue of the right breast which appears consistent with prior exams. Diffuse rash/skin eruption spanning from the superior mastectomy tissue of the right breast to the central right breast, laterally to the right axillary region and proximal aspect of the anterior right upper arm, right flank, left medial breast, and right thigh. Rash is erythematous and pinpoint papules present at site. Patient notes pruritus at all of her rash sites.    Assessment/Plan   Patient presents for a postoperative visit status post completion mastectomy with right breast and reconstruction with autologous tissue Maxine flap.  Patient continues to progress appropriately postoperatively.  She did have some newly noted induration at the lateral aspect of the right reconstructed breast at the negative tissue was considered likely secondary to intraoperative suture placement at this site.  There is consistent reddened coloration of the mastectomy tissue superiorly at the right breast which is consistent with patient's history of prior radiation skin changes at this site.  No active concerns for infection at this time.  Patient does have a rash/nonspecific skin eruption spanning the entire right breast, right flank and into the proximal right upper arm, right upper thigh, medial left breast which is considered likely secondary to irritation versus reaction.    Plan:  - Referral placed to dermatology for further evaluation of rash  - Recommend discontinuing hydrocortisone ointment due to minimal relief  - Continue use of benadryl cream as needed for pruritus   - Continue oral benadryl at night for relief of pruritus, advised on sedating effects and not to drive  while taking this medication  - Continue use of any of the following OTC allergy medications (Zyrtec/Claritin/Allegra)  - Recommend keeping areas of rash dry and clean  - Recommend discontinuing use of baby aspirin  - Will continue to follow-up on results of dermatopathology punch biopsy of rash to guide treatment  - Recommend continue wearing surgical compression bra and abdominal binder for majority of day as tolerated with consideration of current symptoms   -If any signs of infection or changes in flap appearance are to occur, please immediately contact the plastic surgery office  - Dr. Liz discussed reconstructive options of left breast   - Encouraged patient to continue to send photos of progression of rash in MyChart    - Follow up clinic appointment in 2 weeks for post-op visit and rash evaluation      Patient and plan discussed with Dr. Liz.     Shell Desouza PA-C  Plastic and Reconstructive Surgery

## 2025-07-15 ENCOUNTER — APPOINTMENT (OUTPATIENT)
Dept: HEMATOLOGY/ONCOLOGY | Facility: CLINIC | Age: 60
End: 2025-07-15
Payer: COMMERCIAL

## 2025-07-21 ENCOUNTER — APPOINTMENT (OUTPATIENT)
Dept: PLASTIC SURGERY | Facility: CLINIC | Age: 60
End: 2025-07-21
Payer: COMMERCIAL

## 2025-07-21 VITALS
SYSTOLIC BLOOD PRESSURE: 133 MMHG | BODY MASS INDEX: 33.84 KG/M2 | RESPIRATION RATE: 16 BRPM | HEIGHT: 63 IN | DIASTOLIC BLOOD PRESSURE: 79 MMHG | HEART RATE: 102 BPM | WEIGHT: 191 LBS

## 2025-07-21 DIAGNOSIS — Z48.89 ENCOUNTER FOR POST SURGICAL WOUND CHECK: ICD-10-CM

## 2025-07-21 DIAGNOSIS — Z98.890 STATUS POST RECONSTRUCTION PROCEDURE: Primary | ICD-10-CM

## 2025-07-21 PROCEDURE — 99024 POSTOP FOLLOW-UP VISIT: CPT | Performed by: PHYSICIAN ASSISTANT

## 2025-07-21 PROCEDURE — 3008F BODY MASS INDEX DOCD: CPT | Performed by: PHYSICIAN ASSISTANT

## 2025-07-21 ASSESSMENT — PAIN SCALES - GENERAL: PAINLEVEL_OUTOF10: 0-NO PAIN

## 2025-07-21 NOTE — LETTER
July 21, 2025     Patient: Yvrose John   YOB: 1965   Date of Visit: 7/21/2025       To Whom It May Concern:     It is my medical opinion that Yvrose John should continue to be off work after her scheduled surgery on 6/6/25. Please allow Mrs. John to continue working remote from home until 8/11/25 when she can return to work.      If you have any questions or concerns, please don't hesitate to call. [phone] Option 5    Sincerely,  Shell Desouza PA-C

## 2025-07-25 LAB
COMMENTS - MP RESULT TYPE: NORMAL
SCAN RESULT: NORMAL

## 2025-08-05 ENCOUNTER — HOSPITAL ENCOUNTER (OUTPATIENT)
Dept: RADIOLOGY | Facility: HOSPITAL | Age: 60
Discharge: HOME | End: 2025-08-05
Payer: COMMERCIAL

## 2025-08-05 DIAGNOSIS — C50.911 BREAST CANCER METASTASIZED TO AXILLARY LYMPH NODE, RIGHT: ICD-10-CM

## 2025-08-05 DIAGNOSIS — C77.3 BREAST CANCER METASTASIZED TO AXILLARY LYMPH NODE, RIGHT: ICD-10-CM

## 2025-08-05 PROCEDURE — 2550000001 HC RX 255 CONTRASTS: Performed by: INTERNAL MEDICINE

## 2025-08-05 PROCEDURE — 77049 MRI BREAST C-+ W/CAD BI: CPT | Performed by: RADIOLOGY

## 2025-08-05 PROCEDURE — A9575 INJ GADOTERATE MEGLUMI 0.1ML: HCPCS | Performed by: INTERNAL MEDICINE

## 2025-08-05 PROCEDURE — 77049 MRI BREAST C-+ W/CAD BI: CPT

## 2025-08-05 RX ORDER — GADOTERATE MEGLUMINE 376.9 MG/ML
0.2 INJECTION INTRAVENOUS
Status: COMPLETED | OUTPATIENT
Start: 2025-08-05 | End: 2025-08-05

## 2025-08-05 RX ADMIN — GADOTERATE MEGLUMINE 17 ML: 376.9 INJECTION INTRAVENOUS at 12:31

## 2025-08-06 ENCOUNTER — HOSPITAL ENCOUNTER (OUTPATIENT)
Dept: RADIOLOGY | Facility: CLINIC | Age: 60
Discharge: HOME | End: 2025-08-06
Payer: COMMERCIAL

## 2025-08-06 DIAGNOSIS — C77.3 BREAST CANCER METASTASIZED TO AXILLARY LYMPH NODE, RIGHT: ICD-10-CM

## 2025-08-06 DIAGNOSIS — Z78.0 MENOPAUSE: ICD-10-CM

## 2025-08-06 DIAGNOSIS — C50.911 BREAST CANCER METASTASIZED TO AXILLARY LYMPH NODE, RIGHT: ICD-10-CM

## 2025-08-06 PROCEDURE — 77080 DXA BONE DENSITY AXIAL: CPT | Performed by: RADIOLOGY

## 2025-08-06 PROCEDURE — 77080 DXA BONE DENSITY AXIAL: CPT

## 2025-08-29 DIAGNOSIS — C50.911 BREAST CANCER METASTASIZED TO AXILLARY LYMPH NODE, RIGHT: Primary | ICD-10-CM

## 2025-08-29 DIAGNOSIS — C77.3 BREAST CANCER METASTASIZED TO AXILLARY LYMPH NODE, RIGHT: Primary | ICD-10-CM

## 2025-09-09 ENCOUNTER — APPOINTMENT (OUTPATIENT)
Dept: SURGICAL ONCOLOGY | Facility: CLINIC | Age: 60
End: 2025-09-09
Payer: COMMERCIAL

## (undated) DEVICE — PROBE, KOVEN VASCULAR, SINGLE USE

## (undated) DEVICE — Device

## (undated) DEVICE — DRESSING, ADHESIVE, ISLAND, TELFA, 2 X 3.75 IN, LF

## (undated) DEVICE — DRAPE, SMARTDRAPE, FOR TIVATO MICROSCOPE

## (undated) DEVICE — ADHESIVE, SKIN, DERMABOND ADVANCED, 15CM, PEN-STYLE

## (undated) DEVICE — SUTURE, SILK, 2-0, 30 IN, SH, BLACK

## (undated) DEVICE — CATHETER TRAY, SURESTEP, 16FR, URINE METER W/STATLOCK

## (undated) DEVICE — SENSOR, SMALL PATCH, VIOPTIX

## (undated) DEVICE — PREP TRAY, SKIN, DRY, W/GLOVES

## (undated) DEVICE — SUTURE, SILK, 3-0, 18 IN, MULTIPACK, BLACK

## (undated) DEVICE — RETRACTOR, CORDLESS, RADIALUX, LIGHTED

## (undated) DEVICE — SUTURE, ETHILON, 3-0, 18 IN, PS2, BLACK

## (undated) DEVICE — STAY SET, SURGICAL, 5MM SHARP HOOK, 8PK

## (undated) DEVICE — COVER PROBE, SOFT FLEX W/ GEL, 5 X 48 IN (13X122CM)

## (undated) DEVICE — NEEDLE, MICRODISSECTION STR 4CM

## (undated) DEVICE — MANIFOLD, 4 PORT NEPTUNE STANDARD

## (undated) DEVICE — DRESSING, ADHESIVE, ISLAND, TELFA, 4 X 10 IN

## (undated) DEVICE — DRAPE, INSTRUMENT, W/POUCH, STERI DRAPE, 7 X 11 IN, DISPOSABLE, STERILE

## (undated) DEVICE — COVER, CART, 45 X 27 X 48 IN, CLEAR

## (undated) DEVICE — SUTURE, VICRYL, 3-0, 27 IN, SH

## (undated) DEVICE — CATHETER, IV, INSYTE, AUTOGUARD, SHIELDED, 24 G X 0.75 IN, VIALON

## (undated) DEVICE — MARKER, SKIN, DUAL TIP, W/RULER AND LABEL

## (undated) DEVICE — DRAPE PACK, UNIVERSAL II

## (undated) DEVICE — DRAIN, CHANNEL W/TROCAR 19F

## (undated) DEVICE — DRESSING, GAUZE, WASHED FLUFF, LARGE, STERILE

## (undated) DEVICE — DISSECTOR, EXACT, LIGASURE

## (undated) DEVICE — TUBING, SUCTION, CONNECTING, STERILE 0.25 X 120 IN., LF

## (undated) DEVICE — APPLICATOR, CHLORAPREP, W/ORANGE TINT, 26ML

## (undated) DEVICE — EVACUATOR, WOUND, SUCTION, CLOSED, JACKSON-PRATT, 100 CC, SILICONE

## (undated) DEVICE — DRESSING, ISLAND, TELFA, 4 X 5 IN

## (undated) DEVICE — DRESSING, NON-ADHERENT, TELFA, OUCHLESS, 3 X 8 IN, STERILE

## (undated) DEVICE — THERAPY UNIT, 14-DAY PREVENA PLUS 125

## (undated) DEVICE — WOUND VAC KIT, W/CANISTER, 500ML

## (undated) DEVICE — DRESSING, SILVER IMPREGNATED, AQUACEL AG EXTRA, 4X5

## (undated) DEVICE — TOWEL, SURGICAL, NEURO, O/R, 16 X 26, BLUE, STERILE

## (undated) DEVICE — DRESSING, PREVENA PLUS, CUSTOMIZABLE, 90CM

## (undated) DEVICE — COVER, TABLE, 44 X 75 IN, DISPOSABLE, LF, STERILE

## (undated) DEVICE — DRAPE, TIBURON W/ADHESIVE, 19 X 30

## (undated) DEVICE — CLEANER, WIPE, INSTRUMENT, 3.25 X 3.25 IN

## (undated) DEVICE — STAPLER, SKIN PROXIMATE, 35 WIDE

## (undated) DEVICE — CLIP, LIGATING, W/ADHESIVE PAD, MEDIUM, TITANIUM

## (undated) DEVICE — CUP, SOLUTION

## (undated) DEVICE — APPLIER,  LIGACLIP MULTI CLIP, 30 MED 11 1/2

## (undated) DEVICE — SPONGE, LAP, XRAY DECT, 18IN X 18IN, W/LOOP, STERILE

## (undated) DEVICE — COVER, TABLE, UHC

## (undated) DEVICE — DRAPE, SHEET, FAN FOLDED, HALF, 44 X 58 IN, DISPOSABLE, LF, STERILE

## (undated) DEVICE — DRESSING, TRANSPARENT, TEGADERM, 8 X 12 IN

## (undated) DEVICE — SUTURE, MONOCRYL, 4-0, 18 IN, PS2, UNDYED

## (undated) DEVICE — APPLIER, LIGACLIP, MULTIPLE, SMALL 9-3/8IN

## (undated) DEVICE — BAG, DECANTER

## (undated) DEVICE — MICROCLIPS, GEM HEMOSTATIC TITANIUM